# Patient Record
Sex: MALE | Race: WHITE | NOT HISPANIC OR LATINO
[De-identification: names, ages, dates, MRNs, and addresses within clinical notes are randomized per-mention and may not be internally consistent; named-entity substitution may affect disease eponyms.]

---

## 2021-04-19 ENCOUNTER — TRANSCRIPTION ENCOUNTER (OUTPATIENT)
Age: 31
End: 2021-04-19

## 2021-04-20 ENCOUNTER — EMERGENCY (EMERGENCY)
Facility: HOSPITAL | Age: 31
LOS: 1 days | Discharge: LEFT BEFORE DISCHARGE | End: 2021-04-20
Payer: COMMERCIAL

## 2021-04-20 VITALS
WEIGHT: 315 LBS | SYSTOLIC BLOOD PRESSURE: 136 MMHG | RESPIRATION RATE: 18 BRPM | DIASTOLIC BLOOD PRESSURE: 88 MMHG | HEIGHT: 68 IN | HEART RATE: 97 BPM | TEMPERATURE: 99 F | OXYGEN SATURATION: 96 %

## 2021-04-20 PROCEDURE — 99281 EMR DPT VST MAYX REQ PHY/QHP: CPT

## 2021-04-20 PROCEDURE — L9991: CPT

## 2021-06-11 ENCOUNTER — EMERGENCY (EMERGENCY)
Facility: HOSPITAL | Age: 31
LOS: 1 days | Discharge: HOME | End: 2021-06-11
Attending: EMERGENCY MEDICINE
Payer: COMMERCIAL

## 2021-06-11 VITALS
OXYGEN SATURATION: 96 % | TEMPERATURE: 99 F | HEART RATE: 90 BPM | SYSTOLIC BLOOD PRESSURE: 158 MMHG | WEIGHT: 315 LBS | RESPIRATION RATE: 20 BRPM | HEIGHT: 68 IN | DIASTOLIC BLOOD PRESSURE: 101 MMHG

## 2021-06-11 VITALS
RESPIRATION RATE: 18 BRPM | TEMPERATURE: 99 F | SYSTOLIC BLOOD PRESSURE: 118 MMHG | OXYGEN SATURATION: 97 % | HEART RATE: 91 BPM | DIASTOLIC BLOOD PRESSURE: 79 MMHG

## 2021-06-11 LAB
ALBUMIN SERPL ELPH-MCNC: 4.1 G/DL — SIGNIFICANT CHANGE UP (ref 3.3–5.2)
ALP SERPL-CCNC: 103 U/L — SIGNIFICANT CHANGE UP (ref 40–120)
ALT FLD-CCNC: 56 U/L — HIGH
ANION GAP SERPL CALC-SCNC: 13 MMOL/L — SIGNIFICANT CHANGE UP (ref 5–17)
APPEARANCE UR: CLEAR — SIGNIFICANT CHANGE UP
AST SERPL-CCNC: 36 U/L — SIGNIFICANT CHANGE UP
BACTERIA # UR AUTO: ABNORMAL
BASOPHILS # BLD AUTO: 0.07 K/UL — SIGNIFICANT CHANGE UP (ref 0–0.2)
BASOPHILS NFR BLD AUTO: 0.7 % — SIGNIFICANT CHANGE UP (ref 0–2)
BILIRUB SERPL-MCNC: 0.4 MG/DL — SIGNIFICANT CHANGE UP (ref 0.4–2)
BILIRUB UR-MCNC: NEGATIVE — SIGNIFICANT CHANGE UP
BUN SERPL-MCNC: 14.2 MG/DL — SIGNIFICANT CHANGE UP (ref 8–20)
CALCIUM SERPL-MCNC: 9 MG/DL — SIGNIFICANT CHANGE UP (ref 8.6–10.2)
CHLORIDE SERPL-SCNC: 100 MMOL/L — SIGNIFICANT CHANGE UP (ref 98–107)
CO2 SERPL-SCNC: 25 MMOL/L — SIGNIFICANT CHANGE UP (ref 22–29)
COLOR SPEC: YELLOW — SIGNIFICANT CHANGE UP
CREAT SERPL-MCNC: 0.73 MG/DL — SIGNIFICANT CHANGE UP (ref 0.5–1.3)
DIFF PNL FLD: ABNORMAL
EOSINOPHIL # BLD AUTO: 0.17 K/UL — SIGNIFICANT CHANGE UP (ref 0–0.5)
EOSINOPHIL NFR BLD AUTO: 1.6 % — SIGNIFICANT CHANGE UP (ref 0–6)
EPI CELLS # UR: SIGNIFICANT CHANGE UP
GLUCOSE SERPL-MCNC: 228 MG/DL — HIGH (ref 70–99)
GLUCOSE UR QL: NEGATIVE MG/DL — SIGNIFICANT CHANGE UP
HCT VFR BLD CALC: 42.8 % — SIGNIFICANT CHANGE UP (ref 39–50)
HGB BLD-MCNC: 13.8 G/DL — SIGNIFICANT CHANGE UP (ref 13–17)
IMM GRANULOCYTES NFR BLD AUTO: 1.1 % — SIGNIFICANT CHANGE UP (ref 0–1.5)
KETONES UR-MCNC: NEGATIVE — SIGNIFICANT CHANGE UP
LEUKOCYTE ESTERASE UR-ACNC: NEGATIVE — SIGNIFICANT CHANGE UP
LYMPHOCYTES # BLD AUTO: 1.98 K/UL — SIGNIFICANT CHANGE UP (ref 1–3.3)
LYMPHOCYTES # BLD AUTO: 19.1 % — SIGNIFICANT CHANGE UP (ref 13–44)
MCHC RBC-ENTMCNC: 27.2 PG — SIGNIFICANT CHANGE UP (ref 27–34)
MCHC RBC-ENTMCNC: 32.2 GM/DL — SIGNIFICANT CHANGE UP (ref 32–36)
MCV RBC AUTO: 84.3 FL — SIGNIFICANT CHANGE UP (ref 80–100)
MONOCYTES # BLD AUTO: 0.66 K/UL — SIGNIFICANT CHANGE UP (ref 0–0.9)
MONOCYTES NFR BLD AUTO: 6.4 % — SIGNIFICANT CHANGE UP (ref 2–14)
NEUTROPHILS # BLD AUTO: 7.38 K/UL — SIGNIFICANT CHANGE UP (ref 1.8–7.4)
NEUTROPHILS NFR BLD AUTO: 71.1 % — SIGNIFICANT CHANGE UP (ref 43–77)
NITRITE UR-MCNC: NEGATIVE — SIGNIFICANT CHANGE UP
PH UR: 5 — SIGNIFICANT CHANGE UP (ref 5–8)
PLATELET # BLD AUTO: 239 K/UL — SIGNIFICANT CHANGE UP (ref 150–400)
POTASSIUM SERPL-MCNC: 4.3 MMOL/L — SIGNIFICANT CHANGE UP (ref 3.5–5.3)
POTASSIUM SERPL-SCNC: 4.3 MMOL/L — SIGNIFICANT CHANGE UP (ref 3.5–5.3)
PROT SERPL-MCNC: 7.3 G/DL — SIGNIFICANT CHANGE UP (ref 6.6–8.7)
PROT UR-MCNC: 15 MG/DL
RBC # BLD: 5.08 M/UL — SIGNIFICANT CHANGE UP (ref 4.2–5.8)
RBC # FLD: 13.7 % — SIGNIFICANT CHANGE UP (ref 10.3–14.5)
RBC CASTS # UR COMP ASSIST: ABNORMAL /HPF (ref 0–4)
SODIUM SERPL-SCNC: 137 MMOL/L — SIGNIFICANT CHANGE UP (ref 135–145)
SP GR SPEC: 1.01 — SIGNIFICANT CHANGE UP (ref 1.01–1.02)
UROBILINOGEN FLD QL: NEGATIVE MG/DL — SIGNIFICANT CHANGE UP
WBC # BLD: 10.37 K/UL — SIGNIFICANT CHANGE UP (ref 3.8–10.5)
WBC # FLD AUTO: 10.37 K/UL — SIGNIFICANT CHANGE UP (ref 3.8–10.5)
WBC UR QL: SIGNIFICANT CHANGE UP

## 2021-06-11 PROCEDURE — 96375 TX/PRO/DX INJ NEW DRUG ADDON: CPT

## 2021-06-11 PROCEDURE — 99284 EMERGENCY DEPT VISIT MOD MDM: CPT | Mod: 25

## 2021-06-11 PROCEDURE — 96374 THER/PROPH/DIAG INJ IV PUSH: CPT

## 2021-06-11 PROCEDURE — 80053 COMPREHEN METABOLIC PANEL: CPT

## 2021-06-11 PROCEDURE — G1004: CPT

## 2021-06-11 PROCEDURE — 85025 COMPLETE CBC W/AUTO DIFF WBC: CPT

## 2021-06-11 PROCEDURE — 36415 COLL VENOUS BLD VENIPUNCTURE: CPT

## 2021-06-11 PROCEDURE — 74176 CT ABD & PELVIS W/O CONTRAST: CPT

## 2021-06-11 PROCEDURE — 81001 URINALYSIS AUTO W/SCOPE: CPT

## 2021-06-11 PROCEDURE — 99285 EMERGENCY DEPT VISIT HI MDM: CPT

## 2021-06-11 PROCEDURE — 74176 CT ABD & PELVIS W/O CONTRAST: CPT | Mod: 26,ME

## 2021-06-11 RX ORDER — TAMSULOSIN HYDROCHLORIDE 0.4 MG/1
1 CAPSULE ORAL
Qty: 5 | Refills: 0
Start: 2021-06-11 | End: 2021-06-15

## 2021-06-11 RX ORDER — SODIUM CHLORIDE 9 MG/ML
1000 INJECTION INTRAMUSCULAR; INTRAVENOUS; SUBCUTANEOUS ONCE
Refills: 0 | Status: COMPLETED | OUTPATIENT
Start: 2021-06-11 | End: 2021-06-11

## 2021-06-11 RX ORDER — ONDANSETRON 8 MG/1
1 TABLET, FILM COATED ORAL
Qty: 21 | Refills: 0
Start: 2021-06-11 | End: 2021-06-17

## 2021-06-11 RX ORDER — ONDANSETRON 8 MG/1
4 TABLET, FILM COATED ORAL ONCE
Refills: 0 | Status: COMPLETED | OUTPATIENT
Start: 2021-06-11 | End: 2021-06-11

## 2021-06-11 RX ORDER — MORPHINE SULFATE 50 MG/1
4 CAPSULE, EXTENDED RELEASE ORAL ONCE
Refills: 0 | Status: DISCONTINUED | OUTPATIENT
Start: 2021-06-11 | End: 2021-06-11

## 2021-06-11 RX ORDER — TAMSULOSIN HYDROCHLORIDE 0.4 MG/1
0.4 CAPSULE ORAL ONCE
Refills: 0 | Status: COMPLETED | OUTPATIENT
Start: 2021-06-11 | End: 2021-06-11

## 2021-06-11 RX ORDER — KETOROLAC TROMETHAMINE 30 MG/ML
30 SYRINGE (ML) INJECTION ONCE
Refills: 0 | Status: DISCONTINUED | OUTPATIENT
Start: 2021-06-11 | End: 2021-06-11

## 2021-06-11 RX ADMIN — Medication 30 MILLIGRAM(S): at 11:43

## 2021-06-11 RX ADMIN — MORPHINE SULFATE 4 MILLIGRAM(S): 50 CAPSULE, EXTENDED RELEASE ORAL at 10:52

## 2021-06-11 RX ADMIN — SODIUM CHLORIDE 2000 MILLILITER(S): 9 INJECTION INTRAMUSCULAR; INTRAVENOUS; SUBCUTANEOUS at 10:52

## 2021-06-11 RX ADMIN — TAMSULOSIN HYDROCHLORIDE 0.4 MILLIGRAM(S): 0.4 CAPSULE ORAL at 12:50

## 2021-06-11 RX ADMIN — ONDANSETRON 4 MILLIGRAM(S): 8 TABLET, FILM COATED ORAL at 10:52

## 2021-06-11 NOTE — ED STATDOCS - NS ED ATTENDING STATEMENT MOD
I have personally performed a face to face diagnostic evaluation on this patient. I have reviewed the ACP note and agree with the history, exam and plan of care, except as noted.
(1) assistive equipment/uses cane

## 2021-06-11 NOTE — ED STATDOCS - ATTENDING CONTRIBUTION TO CARE
I, Steven Gallego, performed the initial face to face bedside interview with this patient regarding history of present illness, review of symptoms and relevant past medical, social and family history.  I completed an independent physical examination.  I was the initial provider who evaluated this patient. I have signed out the follow up of any pending tests (i.e. labs, radiological studies) to the ACP.  I have communicated the patient’s plan of care and disposition with the ACP.

## 2021-06-11 NOTE — ED STATDOCS - PROGRESS NOTE DETAILS
TANIA TREJO: PT evaluated by intake physician. HPI/PE/ROS as noted above. Will follow up plan per intake physician   left lower back pain radiating to lower abdomen associated with nausea vomiting. no hx of kidney stones. no hematuria   labs and ct pending pt resting comfortably no distress  made aware on all completed results of blood work and ct   ua and culture sent   advised on need for fu with pmd and urology

## 2021-06-11 NOTE — ED STATDOCS - OBJECTIVE STATEMENT
30y male with a PMHx uric acid (was previously prescribed Uracid K) and kidney stones, borderline DM on metformin, sleep apnea, and asthma presents to the ED c/o left flank pain and several vomiting episodes onset 8pm last night. Pt states his abdominal pain radiates all through left flank down to the groin, and is very similar to his pain when he had kidney stones 2 years ago. Pt reports secondary symptoms of diarrhea. Pt denies dysuria, but endorses decreased urine output.

## 2021-06-11 NOTE — ED STATDOCS - PATIENT PORTAL LINK FT
You can access the FollowMyHealth Patient Portal offered by [facility] by registering at the following website: [email or web address]. By joining [employer]’s FollowMyHealth portal, you will also be able to view your health information using other applications (apps) compatible with our system.

## 2021-06-11 NOTE — ED STATDOCS - CARE PROVIDER_API CALL
Alvaro Terry)  Urology  [address], [address]  [place] [address]  Phone: [phone]  Fax: [phone]  Follow Up Time: 7-10 Days

## 2021-07-28 ENCOUNTER — EMERGENCY (EMERGENCY)
Facility: HOSPITAL | Age: 31
LOS: 1 days | Discharge: HOME | End: 2021-07-28
Attending: EMERGENCY MEDICINE
Payer: COMMERCIAL

## 2021-07-28 VITALS
TEMPERATURE: 98 F | WEIGHT: 309.09 LBS | DIASTOLIC BLOOD PRESSURE: 83 MMHG | RESPIRATION RATE: 20 BRPM | HEIGHT: 68 IN | HEART RATE: 86 BPM | OXYGEN SATURATION: 98 % | SYSTOLIC BLOOD PRESSURE: 131 MMHG

## 2021-07-28 LAB
ALBUMIN SERPL ELPH-MCNC: 3.9 G/DL — SIGNIFICANT CHANGE UP (ref 3.3–5.2)
ALP SERPL-CCNC: 117 U/L — SIGNIFICANT CHANGE UP (ref 40–120)
ALT FLD-CCNC: 63 U/L — HIGH
ANION GAP SERPL CALC-SCNC: 13 MMOL/L — SIGNIFICANT CHANGE UP (ref 5–17)
APPEARANCE UR: CLEAR — SIGNIFICANT CHANGE UP
AST SERPL-CCNC: 40 U/L — HIGH
BASOPHILS # BLD AUTO: 0.06 K/UL — SIGNIFICANT CHANGE UP (ref 0–0.2)
BASOPHILS NFR BLD AUTO: 0.6 % — SIGNIFICANT CHANGE UP (ref 0–2)
BILIRUB SERPL-MCNC: 0.4 MG/DL — SIGNIFICANT CHANGE UP (ref 0.4–2)
BILIRUB UR-MCNC: NEGATIVE — SIGNIFICANT CHANGE UP
BUN SERPL-MCNC: 11.1 MG/DL — SIGNIFICANT CHANGE UP (ref 8–20)
CALCIUM SERPL-MCNC: 9.1 MG/DL — SIGNIFICANT CHANGE UP (ref 8.6–10.2)
CHLORIDE SERPL-SCNC: 99 MMOL/L — SIGNIFICANT CHANGE UP (ref 98–107)
CO2 SERPL-SCNC: 25 MMOL/L — SIGNIFICANT CHANGE UP (ref 22–29)
COLOR SPEC: YELLOW — SIGNIFICANT CHANGE UP
CREAT SERPL-MCNC: 0.58 MG/DL — SIGNIFICANT CHANGE UP (ref 0.5–1.3)
DIFF PNL FLD: ABNORMAL
EOSINOPHIL # BLD AUTO: 0.17 K/UL — SIGNIFICANT CHANGE UP (ref 0–0.5)
EOSINOPHIL NFR BLD AUTO: 1.8 % — SIGNIFICANT CHANGE UP (ref 0–6)
GLUCOSE SERPL-MCNC: 177 MG/DL — HIGH (ref 70–99)
GLUCOSE UR QL: 100 MG/DL
HCT VFR BLD CALC: 40 % — SIGNIFICANT CHANGE UP (ref 39–50)
HGB BLD-MCNC: 13.2 G/DL — SIGNIFICANT CHANGE UP (ref 13–17)
IMM GRANULOCYTES NFR BLD AUTO: 1 % — SIGNIFICANT CHANGE UP (ref 0–1.5)
KETONES UR-MCNC: NEGATIVE — SIGNIFICANT CHANGE UP
LEUKOCYTE ESTERASE UR-ACNC: ABNORMAL
LYMPHOCYTES # BLD AUTO: 2.84 K/UL — SIGNIFICANT CHANGE UP (ref 1–3.3)
LYMPHOCYTES # BLD AUTO: 29.2 % — SIGNIFICANT CHANGE UP (ref 13–44)
MCHC RBC-ENTMCNC: 27.6 PG — SIGNIFICANT CHANGE UP (ref 27–34)
MCHC RBC-ENTMCNC: 33 GM/DL — SIGNIFICANT CHANGE UP (ref 32–36)
MCV RBC AUTO: 83.5 FL — SIGNIFICANT CHANGE UP (ref 80–100)
MONOCYTES # BLD AUTO: 0.63 K/UL — SIGNIFICANT CHANGE UP (ref 0–0.9)
MONOCYTES NFR BLD AUTO: 6.5 % — SIGNIFICANT CHANGE UP (ref 2–14)
NEUTROPHILS # BLD AUTO: 5.91 K/UL — SIGNIFICANT CHANGE UP (ref 1.8–7.4)
NEUTROPHILS NFR BLD AUTO: 60.9 % — SIGNIFICANT CHANGE UP (ref 43–77)
NITRITE UR-MCNC: NEGATIVE — SIGNIFICANT CHANGE UP
PH UR: 5 — SIGNIFICANT CHANGE UP (ref 5–8)
PLATELET # BLD AUTO: 223 K/UL — SIGNIFICANT CHANGE UP (ref 150–400)
POTASSIUM SERPL-MCNC: 4.1 MMOL/L — SIGNIFICANT CHANGE UP (ref 3.5–5.3)
POTASSIUM SERPL-SCNC: 4.1 MMOL/L — SIGNIFICANT CHANGE UP (ref 3.5–5.3)
PROT SERPL-MCNC: 7.2 G/DL — SIGNIFICANT CHANGE UP (ref 6.6–8.7)
PROT UR-MCNC: 30 MG/DL
RBC # BLD: 4.79 M/UL — SIGNIFICANT CHANGE UP (ref 4.2–5.8)
RBC # FLD: 14.1 % — SIGNIFICANT CHANGE UP (ref 10.3–14.5)
SODIUM SERPL-SCNC: 137 MMOL/L — SIGNIFICANT CHANGE UP (ref 135–145)
SP GR SPEC: 1.01 — SIGNIFICANT CHANGE UP (ref 1.01–1.02)
UROBILINOGEN FLD QL: NEGATIVE MG/DL — SIGNIFICANT CHANGE UP
WBC # BLD: 9.71 K/UL — SIGNIFICANT CHANGE UP (ref 3.8–10.5)
WBC # FLD AUTO: 9.71 K/UL — SIGNIFICANT CHANGE UP (ref 3.8–10.5)

## 2021-07-28 PROCEDURE — 85025 COMPLETE CBC W/AUTO DIFF WBC: CPT

## 2021-07-28 PROCEDURE — 96374 THER/PROPH/DIAG INJ IV PUSH: CPT

## 2021-07-28 PROCEDURE — 81001 URINALYSIS AUTO W/SCOPE: CPT

## 2021-07-28 PROCEDURE — 36415 COLL VENOUS BLD VENIPUNCTURE: CPT

## 2021-07-28 PROCEDURE — 99284 EMERGENCY DEPT VISIT MOD MDM: CPT | Mod: 25

## 2021-07-28 PROCEDURE — 99285 EMERGENCY DEPT VISIT HI MDM: CPT

## 2021-07-28 PROCEDURE — 74176 CT ABD & PELVIS W/O CONTRAST: CPT | Mod: 26,MA

## 2021-07-28 PROCEDURE — 80053 COMPREHEN METABOLIC PANEL: CPT

## 2021-07-28 PROCEDURE — 74176 CT ABD & PELVIS W/O CONTRAST: CPT | Mod: MA

## 2021-07-28 RX ORDER — TAMSULOSIN HYDROCHLORIDE 0.4 MG/1
1 CAPSULE ORAL
Qty: 7 | Refills: 0
Start: 2021-07-28 | End: 2021-08-03

## 2021-07-28 RX ORDER — KETOROLAC TROMETHAMINE 30 MG/ML
30 SYRINGE (ML) INJECTION ONCE
Refills: 0 | Status: DISCONTINUED | OUTPATIENT
Start: 2021-07-28 | End: 2021-07-28

## 2021-07-28 RX ORDER — ONDANSETRON 8 MG/1
1 TABLET, FILM COATED ORAL
Qty: 12 | Refills: 0
Start: 2021-07-28 | End: 2021-07-30

## 2021-07-28 RX ADMIN — Medication 30 MILLIGRAM(S): at 09:12

## 2021-07-28 NOTE — ED ADULT NURSE NOTE - NSIMPLEMENTINTERV_GEN_ALL_ED
Implemented All Universal Safety Interventions:  [place] to call system. Call bell, personal items and telephone within reach. Instruct patient to call for assistance. Room bathroom lighting operational. Non-slip footwear when patient is off stretcher. Physically safe environment: no spills, clutter or unnecessary equipment. Stretcher in lowest position, wheels locked, appropriate side rails in place.

## 2021-07-28 NOTE — ED STATDOCS - PATIENT PORTAL LINK FT
You can access the FollowMyHealth Patient Portal offered by [facility] by registering at the following website: [email or web address]. By joining [employer]’s FollowMyHealth portal, you will also be able to view your health information using other applications (apps) compatible with our system.

## 2021-07-28 NOTE — ED STATDOCS - CARE PROVIDER_API CALL
Enzo Barnett)  Urology  [address] [place] [address]  Phone: [phone]  Fax: [phone]  Follow Up Time:

## 2021-07-28 NOTE — ED STATDOCS - PROGRESS NOTE DETAILS
NP NOTE:  Charting and results reviewed. Pain resolved, tolerating po.  3mm possible stone mid right ureter.  Will give medications and refer to urology

## 2021-07-28 NOTE — ED STATDOCS - OBJECTIVE STATEMENT
y/o male with PMHx of Kidney stones, Asthma, and COVID PNA on Advair, Zyzol, and Metformin 500mg presents to ED c/o flank pain. Patient reports right flank pain that started last night, with associated dark colored urine, nausea, and vomiting. Had a kidney stone on the left 2 months ago, but has been unable to follow up with urology, next available appointment was October 2021.

## 2021-07-28 NOTE — ED STATDOCS - ATTENDING CONTRIBUTION TO CARE
I,Cameron Naavs MD,  performed the initial face to face bedside interview with this patient regarding history of present illness, review of symptoms and relevant past medical, social and family history.  I completed an independent physical examination.  I was the initial provider who evaluated this patient. I have signed out the follow up of any pending tests (i.e. labs, radiological studies) to the ACP.  I have communicated the patient’s plan of care and disposition with the ACP.  The history, relevant review of systems, past medical and surgical history, medical decision making, and physical examination was documented by the scribe in my presence and I attest to the accuracy of the documentation.

## 2021-07-28 NOTE — ED STATDOCS - NSFOLLOWUPINSTRUCTIONS_ED_ALL_ED_FT
- tylenol or ibuprofen for pain, percocet for severe pain only, do not drive while taking  - take flomax nightly  - zofran is for nausea   - make an appointment with Dr Barnett for follow up

## 2021-07-28 NOTE — ED ADULT NURSE NOTE - OBJECTIVE STATEMENT
assumed pt care at 0910. Pt A&Ox 4 from home c/o right side flank pain with nausea/vomiting that began at 8pm last night. Pt with history of kidney stones, endorses same pain. Pt denies any chest pain, SOB, diarrhea, HA, dizziness, blurred vision, numbness/tingling,  symptoms. No s/s of respiratory distress noted, respirations even & unlabored on room air. Safety maintained,

## 2021-08-17 NOTE — ED ADULT NURSE NOTE - BREATH SOUNDS, MLM
[Normal] : no peripheral adenopathy appreciated [Restricted in physically strenuous activity but ambulatory and able to carry out work of a light or sedentary nature] : Status 1- Restricted in physically strenuous activity but ambulatory and able to carry out work of a light or sedentary nature, e.g., light house work, office work [de-identified] : Pale chronically ill in NAD .  [de-identified] : healed scar  of lymph node biopsy on the left, no residual or  recurrent adenopathy. [de-identified] : prominent aortic valve  click. [de-identified] : no peripheral adenopathy. [de-identified] : spleen no longer palpable . no tenderness. right inguinal hernia, reducible .  Clear

## 2021-11-03 ENCOUNTER — TRANSCRIPTION ENCOUNTER (OUTPATIENT)
Age: 31
End: 2021-11-03

## 2021-11-25 ENCOUNTER — TRANSCRIPTION ENCOUNTER (OUTPATIENT)
Age: 31
End: 2021-11-25

## 2021-11-30 ENCOUNTER — TRANSCRIPTION ENCOUNTER (OUTPATIENT)
Age: 31
End: 2021-11-30

## 2022-04-26 ENCOUNTER — TRANSCRIPTION ENCOUNTER (OUTPATIENT)
Age: 32
End: 2022-04-26

## 2022-04-27 ENCOUNTER — TRANSCRIPTION ENCOUNTER (OUTPATIENT)
Age: 32
End: 2022-04-27

## 2022-05-02 ENCOUNTER — NON-APPOINTMENT (OUTPATIENT)
Age: 32
End: 2022-05-02

## 2022-05-05 ENCOUNTER — OUTPATIENT (OUTPATIENT)
Dept: OUTPATIENT SERVICES | Facility: HOSPITAL | Age: 32
LOS: 1 days | End: 2022-05-05
Payer: COMMERCIAL

## 2022-05-05 ENCOUNTER — APPOINTMENT (OUTPATIENT)
Dept: RADIOLOGY | Facility: CLINIC | Age: 32
End: 2022-05-05
Payer: COMMERCIAL

## 2022-05-05 DIAGNOSIS — J18.0 BRONCHOPNEUMONIA, UNSPECIFIED ORGANISM: ICD-10-CM

## 2022-05-05 PROCEDURE — 71046 X-RAY EXAM CHEST 2 VIEWS: CPT | Mod: 26

## 2022-05-05 PROCEDURE — 71046 X-RAY EXAM CHEST 2 VIEWS: CPT

## 2022-07-22 ENCOUNTER — EMERGENCY (EMERGENCY)
Facility: HOSPITAL | Age: 32
LOS: 1 days | Discharge: HOME | End: 2022-07-22
Attending: EMERGENCY MEDICINE
Payer: COMMERCIAL

## 2022-07-22 VITALS
DIASTOLIC BLOOD PRESSURE: 92 MMHG | SYSTOLIC BLOOD PRESSURE: 140 MMHG | TEMPERATURE: 98 F | OXYGEN SATURATION: 95 % | HEART RATE: 91 BPM | RESPIRATION RATE: 18 BRPM | HEIGHT: 68 IN | WEIGHT: 309.09 LBS

## 2022-07-22 DIAGNOSIS — Z90.49 ACQUIRED ABSENCE OF OTHER SPECIFIED PARTS OF DIGESTIVE TRACT: Chronic | ICD-10-CM

## 2022-07-22 LAB
ANION GAP SERPL CALC-SCNC: 16 MMOL/L — SIGNIFICANT CHANGE UP (ref 5–17)
APPEARANCE UR: CLEAR — SIGNIFICANT CHANGE UP
BACTERIA # UR AUTO: ABNORMAL
BASOPHILS # BLD AUTO: 0.07 K/UL — SIGNIFICANT CHANGE UP (ref 0–0.2)
BASOPHILS NFR BLD AUTO: 0.8 % — SIGNIFICANT CHANGE UP (ref 0–2)
BILIRUB UR-MCNC: NEGATIVE — SIGNIFICANT CHANGE UP
BUN SERPL-MCNC: 17 MG/DL — SIGNIFICANT CHANGE UP (ref 8–20)
CALCIUM SERPL-MCNC: 8.9 MG/DL — SIGNIFICANT CHANGE UP (ref 8.6–10.2)
CHLORIDE SERPL-SCNC: 93 MMOL/L — LOW (ref 98–107)
CO2 SERPL-SCNC: 21 MMOL/L — LOW (ref 22–29)
COLOR SPEC: YELLOW — SIGNIFICANT CHANGE UP
CREAT SERPL-MCNC: 0.74 MG/DL — SIGNIFICANT CHANGE UP (ref 0.5–1.3)
DIFF PNL FLD: ABNORMAL
EGFR: 123 ML/MIN/1.73M2 — SIGNIFICANT CHANGE UP
EOSINOPHIL # BLD AUTO: 0.18 K/UL — SIGNIFICANT CHANGE UP (ref 0–0.5)
EOSINOPHIL NFR BLD AUTO: 2 % — SIGNIFICANT CHANGE UP (ref 0–6)
EPI CELLS # UR: NEGATIVE — SIGNIFICANT CHANGE UP
GLUCOSE SERPL-MCNC: 368 MG/DL — HIGH (ref 70–99)
GLUCOSE UR QL: 1000 MG/DL
HCT VFR BLD CALC: 41.1 % — SIGNIFICANT CHANGE UP (ref 39–50)
HGB BLD-MCNC: 13.9 G/DL — SIGNIFICANT CHANGE UP (ref 13–17)
IMM GRANULOCYTES NFR BLD AUTO: 0.8 % — SIGNIFICANT CHANGE UP (ref 0–1.5)
KETONES UR-MCNC: ABNORMAL
LEUKOCYTE ESTERASE UR-ACNC: NEGATIVE — SIGNIFICANT CHANGE UP
LYMPHOCYTES # BLD AUTO: 2.49 K/UL — SIGNIFICANT CHANGE UP (ref 1–3.3)
LYMPHOCYTES # BLD AUTO: 27.8 % — SIGNIFICANT CHANGE UP (ref 13–44)
MCHC RBC-ENTMCNC: 28.1 PG — SIGNIFICANT CHANGE UP (ref 27–34)
MCHC RBC-ENTMCNC: 33.8 GM/DL — SIGNIFICANT CHANGE UP (ref 32–36)
MCV RBC AUTO: 83.2 FL — SIGNIFICANT CHANGE UP (ref 80–100)
MONOCYTES # BLD AUTO: 0.62 K/UL — SIGNIFICANT CHANGE UP (ref 0–0.9)
MONOCYTES NFR BLD AUTO: 6.9 % — SIGNIFICANT CHANGE UP (ref 2–14)
NEUTROPHILS # BLD AUTO: 5.52 K/UL — SIGNIFICANT CHANGE UP (ref 1.8–7.4)
NEUTROPHILS NFR BLD AUTO: 61.7 % — SIGNIFICANT CHANGE UP (ref 43–77)
NITRITE UR-MCNC: NEGATIVE — SIGNIFICANT CHANGE UP
PH UR: 5 — SIGNIFICANT CHANGE UP (ref 5–8)
PLATELET # BLD AUTO: 213 K/UL — SIGNIFICANT CHANGE UP (ref 150–400)
POTASSIUM SERPL-MCNC: 4.1 MMOL/L — SIGNIFICANT CHANGE UP (ref 3.5–5.3)
POTASSIUM SERPL-SCNC: 4.1 MMOL/L — SIGNIFICANT CHANGE UP (ref 3.5–5.3)
PROT UR-MCNC: 30 MG/DL
RBC # BLD: 4.94 M/UL — SIGNIFICANT CHANGE UP (ref 4.2–5.8)
RBC # FLD: 13.3 % — SIGNIFICANT CHANGE UP (ref 10.3–14.5)
RBC CASTS # UR COMP ASSIST: ABNORMAL /HPF (ref 0–4)
SODIUM SERPL-SCNC: 130 MMOL/L — LOW (ref 135–145)
SP GR SPEC: 1.01 — SIGNIFICANT CHANGE UP (ref 1.01–1.02)
UROBILINOGEN FLD QL: NEGATIVE MG/DL — SIGNIFICANT CHANGE UP
WBC # BLD: 8.95 K/UL — SIGNIFICANT CHANGE UP (ref 3.8–10.5)
WBC # FLD AUTO: 8.95 K/UL — SIGNIFICANT CHANGE UP (ref 3.8–10.5)
WBC UR QL: NEGATIVE /HPF — SIGNIFICANT CHANGE UP (ref 0–5)

## 2022-07-22 PROCEDURE — 96375 TX/PRO/DX INJ NEW DRUG ADDON: CPT

## 2022-07-22 PROCEDURE — 81001 URINALYSIS AUTO W/SCOPE: CPT

## 2022-07-22 PROCEDURE — G1004: CPT

## 2022-07-22 PROCEDURE — 74177 CT ABD & PELVIS W/CONTRAST: CPT | Mod: 26,ME

## 2022-07-22 PROCEDURE — 36415 COLL VENOUS BLD VENIPUNCTURE: CPT

## 2022-07-22 PROCEDURE — 96374 THER/PROPH/DIAG INJ IV PUSH: CPT | Mod: XU

## 2022-07-22 PROCEDURE — 80048 BASIC METABOLIC PNL TOTAL CA: CPT

## 2022-07-22 PROCEDURE — 85025 COMPLETE CBC W/AUTO DIFF WBC: CPT

## 2022-07-22 PROCEDURE — 99285 EMERGENCY DEPT VISIT HI MDM: CPT

## 2022-07-22 PROCEDURE — 82962 GLUCOSE BLOOD TEST: CPT

## 2022-07-22 PROCEDURE — 99284 EMERGENCY DEPT VISIT MOD MDM: CPT | Mod: 25

## 2022-07-22 PROCEDURE — 74177 CT ABD & PELVIS W/CONTRAST: CPT | Mod: ME

## 2022-07-22 PROCEDURE — 96361 HYDRATE IV INFUSION ADD-ON: CPT

## 2022-07-22 RX ORDER — KETOROLAC TROMETHAMINE 30 MG/ML
1 SYRINGE (ML) INJECTION
Qty: 5 | Refills: 0
Start: 2022-07-22 | End: 2022-07-26

## 2022-07-22 RX ORDER — SODIUM CHLORIDE 9 MG/ML
1000 INJECTION INTRAMUSCULAR; INTRAVENOUS; SUBCUTANEOUS ONCE
Refills: 0 | Status: COMPLETED | OUTPATIENT
Start: 2022-07-22 | End: 2022-07-22

## 2022-07-22 RX ORDER — TAMSULOSIN HYDROCHLORIDE 0.4 MG/1
1 CAPSULE ORAL
Qty: 5 | Refills: 0
Start: 2022-07-22 | End: 2022-07-26

## 2022-07-22 RX ORDER — ONDANSETRON 8 MG/1
1 TABLET, FILM COATED ORAL
Qty: 12 | Refills: 0
Start: 2022-07-22 | End: 2022-07-24

## 2022-07-22 RX ORDER — KETOROLAC TROMETHAMINE 30 MG/ML
1 SYRINGE (ML) INJECTION
Qty: 3 | Refills: 0
Start: 2022-07-22 | End: 2022-07-24

## 2022-07-22 RX ORDER — KETOROLAC TROMETHAMINE 30 MG/ML
30 SYRINGE (ML) INJECTION ONCE
Refills: 0 | Status: DISCONTINUED | OUTPATIENT
Start: 2022-07-22 | End: 2022-07-22

## 2022-07-22 RX ORDER — MORPHINE SULFATE 50 MG/1
8 CAPSULE, EXTENDED RELEASE ORAL ONCE
Refills: 0 | Status: DISCONTINUED | OUTPATIENT
Start: 2022-07-22 | End: 2022-07-22

## 2022-07-22 RX ORDER — SODIUM CHLORIDE 9 MG/ML
1000 INJECTION INTRAMUSCULAR; INTRAVENOUS; SUBCUTANEOUS ONCE
Refills: 0 | Status: DISCONTINUED | OUTPATIENT
Start: 2022-07-22 | End: 2022-07-26

## 2022-07-22 RX ORDER — METOCLOPRAMIDE HCL 10 MG
10 TABLET ORAL ONCE
Refills: 0 | Status: COMPLETED | OUTPATIENT
Start: 2022-07-22 | End: 2022-07-22

## 2022-07-22 RX ADMIN — Medication 10 MILLIGRAM(S): at 09:52

## 2022-07-22 RX ADMIN — SODIUM CHLORIDE 1000 MILLILITER(S): 9 INJECTION INTRAMUSCULAR; INTRAVENOUS; SUBCUTANEOUS at 09:51

## 2022-07-22 RX ADMIN — Medication 30 MILLIGRAM(S): at 09:51

## 2022-07-22 RX ADMIN — MORPHINE SULFATE 8 MILLIGRAM(S): 50 CAPSULE, EXTENDED RELEASE ORAL at 09:52

## 2022-07-22 NOTE — ED STATDOCS - NS ED ATTENDING STATEMENT MOD
This was a shared visit with the [facility]. I reviewed and verified the documentation and independently performed the documented:

## 2022-07-22 NOTE — ED STATDOCS - CLINICAL SUMMARY MEDICAL DECISION MAKING FREE TEXT BOX
32 y/o M with PMHx morbid obesity, DM (off  metformin since 3/2021 by PCP due to normal A1c), kidney stones and PSHx gallstones removal presenting for Rt flank pain a/w nausea and NBNB emesis x 3days now getting worse today. Labs showed elevated glucose 368 and UA with large blood. CT abdo showed mild right hydronephrosis due to obstructing 7mm calculus at the UPJ. Pt given IVF, toradol/reglan and morphine with relief of symptoms. All results explained to patient.  Instructed to f/u with urology clinic in 1-2 days. Instructed to f/u with PMD for repeat A1C.  Strict ED return precautions given. Pain meds sent to his pharmacy.

## 2022-07-22 NOTE — ED STATDOCS - CARE PROVIDER_API CALL
Cristi Siddiqui)  Urology  [address] [place] [address]  Phone: [phone]  Fax: [phone]  Follow Up Time: 1-3 Days

## 2022-07-22 NOTE — ED STATDOCS - NSFOLLOWUPINSTRUCTIONS_ED_ALL_ED_FT
1)Follow up with urology clinic in 1-2 days  2) Follow up with your PCP in 1-2 days for repeat blood work (A1c)    Kidney Stones    Kidney stones (urolithiasis) are crystal deposits that form inside your kidneys. Pain is caused by the stone moving through the urinary tract, causing spasms of the ureter. Drink enough water and fluids to keep your urine clear or pale yellow. This will help you to pass the stone or stone fragments. If provided a strainer, strain all urine and keep all particulate matter and stones for a follow up appointment with a urologist.    SEEK IMMEDIATE MEDICAL CARE IF YOU HAVE ANY OF THE FOLLOWING SYMPTOMS: pain not controlled with medication, fever/chills, worsening vomiting, inability to urinate, or dizziness/lightheadedness.

## 2022-07-22 NOTE — ED STATDOCS - OBJECTIVE STATEMENT
32 y/o M with PMHx morbid obesity, DM (off  metformin since 3/2021 by PCP due to normal A1c), kidney stones and PSHx gallstones removal presenting for Rt flank pain a/w nausea and NBNB emesis x 3days now getting worse today. No fever/chills, sob, hematuria/dysuria, cough, abdominal pain, back pain, diarrhea/bloody stools/melena and with no other c/o. No trauma/injury.

## 2022-07-22 NOTE — ED ADULT NURSE NOTE - NS ED NURSE LEVEL OF CONSCIOUSNESS MENTAL STATUS
He can check his lipid  He is on medicine for high cholesterol  Last lab not at goal in 9/17 Awake/Alert

## 2022-07-22 NOTE — ED ADULT TRIAGE NOTE - CHIEF COMPLAINT QUOTE
pt states he has a right kidney stone, c/o RT flank pain  A&Ox3, resp wnl, + vomiting since Wednesday

## 2022-07-22 NOTE — ED ADULT NURSE NOTE - OBJECTIVE STATEMENT
a&ox4 c/o right flank pain, RLQ pain, + urinary symptoms x wednesday. pt reports hx renal colic. denies fever/chills. + N/V

## 2022-07-22 NOTE — ED STATDOCS - PATIENT PORTAL LINK FT
You can access the FollowMyHealth Patient Portal offered by [facility] by registering at the following website: [email or web address]. By joining [employer]’s FollowMyHealth portal, you will also be able to view your health information using other applications (apps) compatible with our system.

## 2022-07-22 NOTE — ED STATDOCS - PROGRESS NOTE DETAILS
PT evaluated by intake physician. HPI/ROS/PE as noted above. Will follow up plan per intake physician  Pt reports improvement of symptoms in the ED after pain meds.

## 2022-07-25 ENCOUNTER — EMERGENCY (EMERGENCY)
Facility: HOSPITAL | Age: 32
LOS: 1 days | Discharge: HOME | End: 2022-07-25
Attending: EMERGENCY MEDICINE
Payer: COMMERCIAL

## 2022-07-25 VITALS
SYSTOLIC BLOOD PRESSURE: 101 MMHG | HEIGHT: 68 IN | DIASTOLIC BLOOD PRESSURE: 70 MMHG | RESPIRATION RATE: 18 BRPM | OXYGEN SATURATION: 98 % | HEART RATE: 96 BPM | WEIGHT: 309.09 LBS | TEMPERATURE: 99 F

## 2022-07-25 VITALS — OXYGEN SATURATION: 97 % | DIASTOLIC BLOOD PRESSURE: 82 MMHG | HEART RATE: 78 BPM | SYSTOLIC BLOOD PRESSURE: 116 MMHG

## 2022-07-25 DIAGNOSIS — Z90.49 ACQUIRED ABSENCE OF OTHER SPECIFIED PARTS OF DIGESTIVE TRACT: Chronic | ICD-10-CM

## 2022-07-25 PROBLEM — E11.9 TYPE 2 DIABETES MELLITUS WITHOUT COMPLICATIONS: Chronic | Status: ACTIVE | Noted: 2022-07-22

## 2022-07-25 PROBLEM — N20.0 CALCULUS OF KIDNEY: Chronic | Status: ACTIVE | Noted: 2021-06-17

## 2022-07-25 LAB
ALBUMIN SERPL ELPH-MCNC: 4 G/DL — SIGNIFICANT CHANGE UP (ref 3.3–5.2)
ALP SERPL-CCNC: 110 U/L — SIGNIFICANT CHANGE UP (ref 40–120)
ALT FLD-CCNC: 41 U/L — HIGH
ANION GAP SERPL CALC-SCNC: 15 MMOL/L — SIGNIFICANT CHANGE UP (ref 5–17)
APPEARANCE UR: CLEAR — SIGNIFICANT CHANGE UP
AST SERPL-CCNC: 42 U/L — HIGH
BACTERIA # UR AUTO: ABNORMAL
BASOPHILS # BLD AUTO: 0.05 K/UL — SIGNIFICANT CHANGE UP (ref 0–0.2)
BASOPHILS NFR BLD AUTO: 0.6 % — SIGNIFICANT CHANGE UP (ref 0–2)
BILIRUB SERPL-MCNC: 0.6 MG/DL — SIGNIFICANT CHANGE UP (ref 0.4–2)
BILIRUB UR-MCNC: NEGATIVE — SIGNIFICANT CHANGE UP
BUN SERPL-MCNC: 13.8 MG/DL — SIGNIFICANT CHANGE UP (ref 8–20)
CALCIUM SERPL-MCNC: 8.8 MG/DL — SIGNIFICANT CHANGE UP (ref 8.6–10.2)
CHLORIDE SERPL-SCNC: 92 MMOL/L — LOW (ref 98–107)
CO2 SERPL-SCNC: 23 MMOL/L — SIGNIFICANT CHANGE UP (ref 22–29)
COLOR SPEC: YELLOW — SIGNIFICANT CHANGE UP
CREAT SERPL-MCNC: 1.1 MG/DL — SIGNIFICANT CHANGE UP (ref 0.5–1.3)
DIFF PNL FLD: ABNORMAL
EGFR: 91 ML/MIN/1.73M2 — SIGNIFICANT CHANGE UP
EOSINOPHIL # BLD AUTO: 0.13 K/UL — SIGNIFICANT CHANGE UP (ref 0–0.5)
EOSINOPHIL NFR BLD AUTO: 1.7 % — SIGNIFICANT CHANGE UP (ref 0–6)
EPI CELLS # UR: SIGNIFICANT CHANGE UP
GLUCOSE SERPL-MCNC: 271 MG/DL — HIGH (ref 70–99)
GLUCOSE UR QL: 100 MG/DL
HCT VFR BLD CALC: 40 % — SIGNIFICANT CHANGE UP (ref 39–50)
HGB BLD-MCNC: 13.5 G/DL — SIGNIFICANT CHANGE UP (ref 13–17)
IMM GRANULOCYTES NFR BLD AUTO: 0.6 % — SIGNIFICANT CHANGE UP (ref 0–1.5)
KETONES UR-MCNC: ABNORMAL
LEUKOCYTE ESTERASE UR-ACNC: ABNORMAL
LIDOCAIN IGE QN: 26 U/L — SIGNIFICANT CHANGE UP (ref 22–51)
LYMPHOCYTES # BLD AUTO: 1.32 K/UL — SIGNIFICANT CHANGE UP (ref 1–3.3)
LYMPHOCYTES # BLD AUTO: 17.1 % — SIGNIFICANT CHANGE UP (ref 13–44)
MCHC RBC-ENTMCNC: 27.7 PG — SIGNIFICANT CHANGE UP (ref 27–34)
MCHC RBC-ENTMCNC: 33.8 GM/DL — SIGNIFICANT CHANGE UP (ref 32–36)
MCV RBC AUTO: 82.1 FL — SIGNIFICANT CHANGE UP (ref 80–100)
MONOCYTES # BLD AUTO: 0.57 K/UL — SIGNIFICANT CHANGE UP (ref 0–0.9)
MONOCYTES NFR BLD AUTO: 7.4 % — SIGNIFICANT CHANGE UP (ref 2–14)
NEUTROPHILS # BLD AUTO: 5.59 K/UL — SIGNIFICANT CHANGE UP (ref 1.8–7.4)
NEUTROPHILS NFR BLD AUTO: 72.6 % — SIGNIFICANT CHANGE UP (ref 43–77)
NITRITE UR-MCNC: NEGATIVE — SIGNIFICANT CHANGE UP
PH UR: 6 — SIGNIFICANT CHANGE UP (ref 5–8)
PLATELET # BLD AUTO: 180 K/UL — SIGNIFICANT CHANGE UP (ref 150–400)
POTASSIUM SERPL-MCNC: 4.2 MMOL/L — SIGNIFICANT CHANGE UP (ref 3.5–5.3)
POTASSIUM SERPL-SCNC: 4.2 MMOL/L — SIGNIFICANT CHANGE UP (ref 3.5–5.3)
PROT SERPL-MCNC: 7 G/DL — SIGNIFICANT CHANGE UP (ref 6.6–8.7)
PROT UR-MCNC: NEGATIVE — SIGNIFICANT CHANGE UP
RBC # BLD: 4.87 M/UL — SIGNIFICANT CHANGE UP (ref 4.2–5.8)
RBC # FLD: 13.3 % — SIGNIFICANT CHANGE UP (ref 10.3–14.5)
RBC CASTS # UR COMP ASSIST: ABNORMAL /HPF (ref 0–4)
SARS-COV-2 RNA SPEC QL NAA+PROBE: SIGNIFICANT CHANGE UP
SODIUM SERPL-SCNC: 130 MMOL/L — LOW (ref 135–145)
SP GR SPEC: 1.01 — SIGNIFICANT CHANGE UP (ref 1.01–1.02)
URATE CRY FLD QL MICRO: ABNORMAL
UROBILINOGEN FLD QL: NEGATIVE MG/DL — SIGNIFICANT CHANGE UP
WBC # BLD: 7.71 K/UL — SIGNIFICANT CHANGE UP (ref 3.8–10.5)
WBC # FLD AUTO: 7.71 K/UL — SIGNIFICANT CHANGE UP (ref 3.8–10.5)
WBC UR QL: SIGNIFICANT CHANGE UP /HPF (ref 0–5)

## 2022-07-25 PROCEDURE — 81001 URINALYSIS AUTO W/SCOPE: CPT

## 2022-07-25 PROCEDURE — 76775 US EXAM ABDO BACK WALL LIM: CPT | Mod: 26

## 2022-07-25 PROCEDURE — 76775 US EXAM ABDO BACK WALL LIM: CPT

## 2022-07-25 PROCEDURE — 99285 EMERGENCY DEPT VISIT HI MDM: CPT

## 2022-07-25 PROCEDURE — 96375 TX/PRO/DX INJ NEW DRUG ADDON: CPT

## 2022-07-25 PROCEDURE — 36415 COLL VENOUS BLD VENIPUNCTURE: CPT

## 2022-07-25 PROCEDURE — 80053 COMPREHEN METABOLIC PANEL: CPT

## 2022-07-25 PROCEDURE — 99284 EMERGENCY DEPT VISIT MOD MDM: CPT | Mod: 25

## 2022-07-25 PROCEDURE — 96374 THER/PROPH/DIAG INJ IV PUSH: CPT

## 2022-07-25 PROCEDURE — 83690 ASSAY OF LIPASE: CPT

## 2022-07-25 PROCEDURE — 85025 COMPLETE CBC W/AUTO DIFF WBC: CPT

## 2022-07-25 PROCEDURE — U0005: CPT

## 2022-07-25 PROCEDURE — U0003: CPT

## 2022-07-25 RX ORDER — ONDANSETRON 8 MG/1
4 TABLET, FILM COATED ORAL ONCE
Refills: 0 | Status: COMPLETED | OUTPATIENT
Start: 2022-07-25 | End: 2022-07-25

## 2022-07-25 RX ORDER — KETOROLAC TROMETHAMINE 30 MG/ML
15 SYRINGE (ML) INJECTION ONCE
Refills: 0 | Status: DISCONTINUED | OUTPATIENT
Start: 2022-07-25 | End: 2022-07-25

## 2022-07-25 RX ORDER — SODIUM CHLORIDE 9 MG/ML
1000 INJECTION INTRAMUSCULAR; INTRAVENOUS; SUBCUTANEOUS ONCE
Refills: 0 | Status: COMPLETED | OUTPATIENT
Start: 2022-07-25 | End: 2022-07-25

## 2022-07-25 RX ORDER — IBUPROFEN 200 MG
1 TABLET ORAL
Qty: 28 | Refills: 0
Start: 2022-07-25 | End: 2022-07-31

## 2022-07-25 RX ORDER — ONDANSETRON 8 MG/1
1 TABLET, FILM COATED ORAL
Qty: 12 | Refills: 0
Start: 2022-07-25 | End: 2022-07-27

## 2022-07-25 RX ORDER — MORPHINE SULFATE 50 MG/1
4 CAPSULE, EXTENDED RELEASE ORAL ONCE
Refills: 0 | Status: DISCONTINUED | OUTPATIENT
Start: 2022-07-25 | End: 2022-07-25

## 2022-07-25 RX ADMIN — SODIUM CHLORIDE 1000 MILLILITER(S): 9 INJECTION INTRAMUSCULAR; INTRAVENOUS; SUBCUTANEOUS at 08:18

## 2022-07-25 RX ADMIN — Medication 15 MILLIGRAM(S): at 09:35

## 2022-07-25 RX ADMIN — ONDANSETRON 4 MILLIGRAM(S): 8 TABLET, FILM COATED ORAL at 09:35

## 2022-07-25 RX ADMIN — MORPHINE SULFATE 4 MILLIGRAM(S): 50 CAPSULE, EXTENDED RELEASE ORAL at 08:18

## 2022-07-25 RX ADMIN — MORPHINE SULFATE 4 MILLIGRAM(S): 50 CAPSULE, EXTENDED RELEASE ORAL at 10:05

## 2022-07-25 RX ADMIN — Medication 15 MILLIGRAM(S): at 10:28

## 2022-07-25 NOTE — ED STATDOCS - PROGRESS NOTE DETAILS
TANIA Nails: PT evaluated by intake physician. HPI/PE/ROS as noted above. Will follow up plan per intake physician TANIA Nails: Pt noting significant improvement in pain, tolerating PO intake. has information for urologist to f/u with, has appt in <2 weeks. provided rx for ibuprofen, percocet and zofran odt. given copy of results, return precautions discussed

## 2022-07-25 NOTE — ED STATDOCS - PATIENT PORTAL LINK FT
You can access the FollowMyHealth Patient Portal offered by [facility] by registering at the following website: [email or web address]. By joining [employer]’s FollowMyHealth portal, you will also be able to view your health information using other applications (apps) compatible with our system.

## 2022-07-25 NOTE — ED ADULT NURSE NOTE - NSICDXPASTMEDICALHX_GEN_ALL_CORE_FT
PAST MEDICAL HISTORY:  DM2 (diabetes mellitus, type 2)     Kidney stones       
Detail Level: Generalized
Detail Level: Zone
Detail Level: Detailed

## 2022-07-25 NOTE — ED ADULT NURSE NOTE - OBJECTIVE STATEMENT
+ right flank, back pain, onset Wednesday 7/20; pt came to ED Friday and diagnosed with kidney stone.  pt returns today because pain is still unbearable and pt unable to hold down any food

## 2022-07-25 NOTE — ED STATDOCS - NSFOLLOWUPINSTRUCTIONS_ED_ALL_ED_FT
- Return to the ED for any new or worsening symptoms.   - Take medication as directed  - Follow-up with Urologist for further evaluation of kidney stone    Kidney Stones    Kidney stones (urolithiasis) are crystal deposits that form inside your kidneys. Pain is caused by the stone moving through the urinary tract, causing spasms of the ureter. Drink enough water and fluids to keep your urine clear or pale yellow. This will help you to pass the stone or stone fragments. If provided a strainer, strain all urine and keep all particulate matter and stones for a follow up appointment with a urologist.    SEEK IMMEDIATE MEDICAL CARE IF YOU HAVE ANY OF THE FOLLOWING SYMPTOMS: pain not controlled with medication, fever/chills, worsening vomiting, inability to urinate, or dizziness/lightheadedness.

## 2022-07-25 NOTE — ED STATDOCS - OBJECTIVE STATEMENT
Pt is a 30 y/o M with PMHx DM2, kidney stones, sleep apnea presents to the ED c/o abdominal pain with associated n/v, hematuria. Pt states he was here Friday, diagnosed with 7mm stone. Pain is progressively worsening  unable to hold solid food and not producing much urine. Symptoms began Thursday night with hematuria and pain in his R flank. Pt d/c with Flomax and Ketoralac. Pt ws told if he didn't pain worsened to come back to ED. Gallbladder removed 11 years ago. Takes Xyzal and B12 on a daily. Denies fever/chills, smoking, drinking. U2D with vaccine. Pt is a 32 y/o M with PMHx DM2, kidney stones, sleep apnea presents to the ED c/o abdominal pain with associated n/v, hematuria. Pt states he was here Friday, diagnosed with 7mm stone. Pain is progressively worsening, unable to hold solid food and not producing much urine. Symptoms began Thursday night with hematuria and pain in his R flank. Pt was d/c with Flomax and Ketoralac. Pt was advised to come back if his pain worsened. Takes Xyzal and B12 on a daily. Denies fever/chills, smoking, drinking. U2D with vaccine. Pt is a 32 y/o M with PMHx DM2, kidney stones, sleep apnea presents to the ED c/o worsening right flank pain and decreased urine output with assoc vomiting.  Reports blood in urine on wednesday night; developed right flank pain on thursday.  Seen in ED on friday and diagnosed with 7mm obstructing kidney stone.  Rx'd flomax and ketoralac once daily which is providing  no relief.   Denies fever/chills, smoking, drinking. UTD with COVID vaccine.  Does not have a urologist.

## 2022-07-25 NOTE — ED STATDOCS - NS_ ATTENDINGSCRIBEDETAILS _ED_A_ED_FT
I, Abran Vargas, performed the initial face to face bedside interview with this patient regarding history of present illness, review of symptoms and relevant past medical, social and family history.  I completed an independent physical examination.  I was the provider who initially evaluated this patient.  The history, relevant review of systems, past medical and surgical history, medical decision making, and physical examination was documented by the scribe in my presence and I attest to the accuracy of the documentation. Follow-up on ordered tests (ie labs, radiologic studies) and re-evaluation of the patient's status has been communicated to the ACP.  Disposition of the patient will be based on test outcome and response to ED interventions.

## 2022-07-25 NOTE — ED STATDOCS - ENMT, MLM
Nasal mucosa clear.  Mouth with normal mucosa  Throat has no vesicles, no oropharyngeal exudates and uvula is midline. Mouth with moist mucosa

## 2022-07-25 NOTE — ED STATDOCS - CARE PROVIDER_API CALL
Alvaro Terry)  Urology  [address], [address]  [place] [address]  Phone: [phone]  Fax: [phone]  Follow Up Time:

## 2022-07-25 NOTE — ED STATDOCS - CLINICAL SUMMARY MEDICAL DECISION MAKING FREE TEXT BOX
kidney stone with intractable pain and decreased urine output , labs, sono, symptom treatment and urology consult for possible stent. kidney stone with intractable pain and decreased urine output:  labs, sono, symptom treatment and urology consult for possible stent.

## 2022-07-26 ENCOUNTER — EMERGENCY (EMERGENCY)
Facility: HOSPITAL | Age: 32
LOS: 1 days | Discharge: HOME | End: 2022-07-26
Attending: STUDENT IN AN ORGANIZED HEALTH CARE EDUCATION/TRAINING PROGRAM
Payer: COMMERCIAL

## 2022-07-26 VITALS
DIASTOLIC BLOOD PRESSURE: 83 MMHG | SYSTOLIC BLOOD PRESSURE: 152 MMHG | HEART RATE: 89 BPM | TEMPERATURE: 98 F | RESPIRATION RATE: 20 BRPM | WEIGHT: 307.99 LBS | HEIGHT: 68 IN | OXYGEN SATURATION: 98 %

## 2022-07-26 VITALS
RESPIRATION RATE: 20 BRPM | DIASTOLIC BLOOD PRESSURE: 84 MMHG | TEMPERATURE: 98 F | SYSTOLIC BLOOD PRESSURE: 128 MMHG | HEART RATE: 71 BPM | OXYGEN SATURATION: 98 %

## 2022-07-26 DIAGNOSIS — Z90.49 ACQUIRED ABSENCE OF OTHER SPECIFIED PARTS OF DIGESTIVE TRACT: Chronic | ICD-10-CM

## 2022-07-26 DIAGNOSIS — Z87.442 PERSONAL HISTORY OF URINARY CALCULI: ICD-10-CM

## 2022-07-26 LAB
ALBUMIN SERPL ELPH-MCNC: 3.9 G/DL — SIGNIFICANT CHANGE UP (ref 3.3–5.2)
ALP SERPL-CCNC: 102 U/L — SIGNIFICANT CHANGE UP (ref 40–120)
ALT FLD-CCNC: 46 U/L — HIGH
ANION GAP SERPL CALC-SCNC: 11 MMOL/L — SIGNIFICANT CHANGE UP (ref 5–17)
APPEARANCE UR: ABNORMAL
AST SERPL-CCNC: 47 U/L — HIGH
BACTERIA # UR AUTO: ABNORMAL
BASOPHILS # BLD AUTO: 0.03 K/UL — SIGNIFICANT CHANGE UP (ref 0–0.2)
BASOPHILS NFR BLD AUTO: 0.4 % — SIGNIFICANT CHANGE UP (ref 0–2)
BILIRUB SERPL-MCNC: 0.5 MG/DL — SIGNIFICANT CHANGE UP (ref 0.4–2)
BILIRUB UR-MCNC: NEGATIVE — SIGNIFICANT CHANGE UP
BUN SERPL-MCNC: 13.6 MG/DL — SIGNIFICANT CHANGE UP (ref 8–20)
CALCIUM SERPL-MCNC: 8.7 MG/DL — SIGNIFICANT CHANGE UP (ref 8.4–10.5)
CHLORIDE SERPL-SCNC: 98 MMOL/L — SIGNIFICANT CHANGE UP (ref 98–107)
CO2 SERPL-SCNC: 25 MMOL/L — SIGNIFICANT CHANGE UP (ref 22–29)
COLOR SPEC: YELLOW — SIGNIFICANT CHANGE UP
CREAT SERPL-MCNC: 1.25 MG/DL — SIGNIFICANT CHANGE UP (ref 0.5–1.3)
DIFF PNL FLD: ABNORMAL
EGFR: 78 ML/MIN/1.73M2 — SIGNIFICANT CHANGE UP
EOSINOPHIL # BLD AUTO: 0.15 K/UL — SIGNIFICANT CHANGE UP (ref 0–0.5)
EOSINOPHIL NFR BLD AUTO: 2.2 % — SIGNIFICANT CHANGE UP (ref 0–6)
EPI CELLS # UR: SIGNIFICANT CHANGE UP
GLUCOSE SERPL-MCNC: 252 MG/DL — HIGH (ref 70–99)
GLUCOSE UR QL: 250 MG/DL
HCT VFR BLD CALC: 38.4 % — LOW (ref 39–50)
HGB BLD-MCNC: 12.4 G/DL — LOW (ref 13–17)
IMM GRANULOCYTES NFR BLD AUTO: 0.7 % — SIGNIFICANT CHANGE UP (ref 0–1.5)
KETONES UR-MCNC: NEGATIVE — SIGNIFICANT CHANGE UP
LEUKOCYTE ESTERASE UR-ACNC: NEGATIVE — SIGNIFICANT CHANGE UP
LYMPHOCYTES # BLD AUTO: 1.4 K/UL — SIGNIFICANT CHANGE UP (ref 1–3.3)
LYMPHOCYTES # BLD AUTO: 20.3 % — SIGNIFICANT CHANGE UP (ref 13–44)
MCHC RBC-ENTMCNC: 27 PG — SIGNIFICANT CHANGE UP (ref 27–34)
MCHC RBC-ENTMCNC: 32.3 GM/DL — SIGNIFICANT CHANGE UP (ref 32–36)
MCV RBC AUTO: 83.5 FL — SIGNIFICANT CHANGE UP (ref 80–100)
MONOCYTES # BLD AUTO: 0.46 K/UL — SIGNIFICANT CHANGE UP (ref 0–0.9)
MONOCYTES NFR BLD AUTO: 6.7 % — SIGNIFICANT CHANGE UP (ref 2–14)
NEUTROPHILS # BLD AUTO: 4.81 K/UL — SIGNIFICANT CHANGE UP (ref 1.8–7.4)
NEUTROPHILS NFR BLD AUTO: 69.7 % — SIGNIFICANT CHANGE UP (ref 43–77)
NITRITE UR-MCNC: NEGATIVE — SIGNIFICANT CHANGE UP
PH UR: 6 — SIGNIFICANT CHANGE UP (ref 5–8)
PLATELET # BLD AUTO: 186 K/UL — SIGNIFICANT CHANGE UP (ref 150–400)
POTASSIUM SERPL-MCNC: 4.9 MMOL/L — SIGNIFICANT CHANGE UP (ref 3.5–5.3)
POTASSIUM SERPL-SCNC: 4.9 MMOL/L — SIGNIFICANT CHANGE UP (ref 3.5–5.3)
PROT SERPL-MCNC: 6.8 G/DL — SIGNIFICANT CHANGE UP (ref 6.6–8.7)
PROT UR-MCNC: NEGATIVE — SIGNIFICANT CHANGE UP
RBC # BLD: 4.6 M/UL — SIGNIFICANT CHANGE UP (ref 4.2–5.8)
RBC # FLD: 13.2 % — SIGNIFICANT CHANGE UP (ref 10.3–14.5)
RBC CASTS # UR COMP ASSIST: NEGATIVE /HPF — SIGNIFICANT CHANGE UP (ref 0–4)
SODIUM SERPL-SCNC: 134 MMOL/L — LOW (ref 135–145)
SP GR SPEC: 1.01 — SIGNIFICANT CHANGE UP (ref 1.01–1.02)
URATE CRY FLD QL MICRO: ABNORMAL
UROBILINOGEN FLD QL: NEGATIVE MG/DL — SIGNIFICANT CHANGE UP
WBC # BLD: 6.9 K/UL — SIGNIFICANT CHANGE UP (ref 3.8–10.5)
WBC # FLD AUTO: 6.9 K/UL — SIGNIFICANT CHANGE UP (ref 3.8–10.5)
WBC UR QL: NEGATIVE /HPF — SIGNIFICANT CHANGE UP (ref 0–5)

## 2022-07-26 PROCEDURE — 96374 THER/PROPH/DIAG INJ IV PUSH: CPT

## 2022-07-26 PROCEDURE — 96375 TX/PRO/DX INJ NEW DRUG ADDON: CPT

## 2022-07-26 PROCEDURE — 36415 COLL VENOUS BLD VENIPUNCTURE: CPT

## 2022-07-26 PROCEDURE — 99285 EMERGENCY DEPT VISIT HI MDM: CPT

## 2022-07-26 PROCEDURE — 76775 US EXAM ABDO BACK WALL LIM: CPT | Mod: 26

## 2022-07-26 PROCEDURE — 81001 URINALYSIS AUTO W/SCOPE: CPT

## 2022-07-26 PROCEDURE — 96361 HYDRATE IV INFUSION ADD-ON: CPT

## 2022-07-26 PROCEDURE — 80053 COMPREHEN METABOLIC PANEL: CPT

## 2022-07-26 PROCEDURE — 87086 URINE CULTURE/COLONY COUNT: CPT

## 2022-07-26 PROCEDURE — 85025 COMPLETE CBC W/AUTO DIFF WBC: CPT

## 2022-07-26 PROCEDURE — 99284 EMERGENCY DEPT VISIT MOD MDM: CPT | Mod: 25

## 2022-07-26 PROCEDURE — 76775 US EXAM ABDO BACK WALL LIM: CPT

## 2022-07-26 RX ORDER — KETOROLAC TROMETHAMINE 30 MG/ML
30 SYRINGE (ML) INJECTION ONCE
Refills: 0 | Status: DISCONTINUED | OUTPATIENT
Start: 2022-07-26 | End: 2022-07-26

## 2022-07-26 RX ORDER — ONDANSETRON 8 MG/1
4 TABLET, FILM COATED ORAL ONCE
Refills: 0 | Status: COMPLETED | OUTPATIENT
Start: 2022-07-26 | End: 2022-07-26

## 2022-07-26 RX ORDER — SODIUM CHLORIDE 9 MG/ML
1000 INJECTION, SOLUTION INTRAVENOUS ONCE
Refills: 0 | Status: COMPLETED | OUTPATIENT
Start: 2022-07-26 | End: 2022-07-26

## 2022-07-26 RX ADMIN — SODIUM CHLORIDE 1000 MILLILITER(S): 9 INJECTION, SOLUTION INTRAVENOUS at 12:43

## 2022-07-26 RX ADMIN — ONDANSETRON 4 MILLIGRAM(S): 8 TABLET, FILM COATED ORAL at 12:43

## 2022-07-26 RX ADMIN — Medication 30 MILLIGRAM(S): at 12:43

## 2022-07-26 NOTE — CONSULT NOTE ADULT - ASSESSMENT
PE:    patient seen and examined   afebrile, VS, /84, P 71, R 20  patient is obese with BMI 46, weight 306 lbs.  abdomen:  soft obese, n/t on palpation , moves his bowels  Back/Flanks:  none tender on palpation of flanks bilaterally  :  voids freely, just voided prior to examination. ( routinely  strains his urine )     Labs: 7/27/22  Complete Blood Count + Automated Diff (07.26.22 @ 12:40)    WBC Count: 6.90 K/uL    RBC Count: 4.60 M/uL    Hemoglobin: 12.4 g/dL    Hematocrit: 38.4 %    Mean Cell Volume: 83.5 fl    Mean Cell Hemoglobin: 27.0 pg    Mean Cell Hemoglobin Conc: 32.3 gm/dL    Red Cell Distrib Width: 13.2 %    Platelet Count - Automated: 186 K/uL    Lytes:   Basic Metabolic Panel (07.22.22 @ 09:30)    Sodium, Serum: 130 mmol/L    Potassium, Serum: 4.1 mmol/L    Chloride, Serum: 93 mmol/L    Carbon Dioxide, Serum: 21.0 mmol/L    Anion Gap, Serum: 16 mmol/L    Blood Urea Nitrogen, Serum: 17.0 mg/dL    Creatinine, Serum: 0.74 mg/dL    Glucose, Serum: 368 mg/dL    Calcium, Total Serum: 8.9 mg/dL       UA:  Urinalysis (07.26.22 @ 12:40)    pH Urine: 6.0    Glucose Qualitative, Urine: 250 mg/dL    Blood, Urine: Small    Color: Yellow    Urine Appearance: Slightly Turbid    Bilirubin: Negative    Ketone - Urine: Negative    Specific Gravity: 1.015    Protein, Urine: Negative    Urobilinogen: Negative mg/dL    Nitrite: Negative    Leukocyte Esterase Concentration: Negative      Radiology 7/26/22  IMPRESSION:  Mild to moderate hydronephrosis on the right is similar to the study of   the previous day    radiology 7/25/22  IMPRESSION:  Mild right hydronephrosis, unchanged.      Radiology 7/22/22  IMPRESSION:  Mild right hydronephrosis secondary to an obstructing 7 mm calculus at   the ureteropelvic junction.          Impression:  right renal calculi, 7mm at the UPJ ( appears clinicallt yo be moving down stream ) . afebrile, pain intermittently managed, no fevers, no wbc, no urine cultures noted over past visits.  States nausea and unable to keep food down.   Discussed with Surgeon present situation and management recommendations.  Plan:  discussed fully with patient recommendations to be admitted and to prepare him for surgery 24-48 hrs. with cysto and stent placement.  Explaining risks and complications.  He understands recommendations but states would like to try further conservative care because he has a wedding he would like to attend in [place] this upcoming week-end.  Explained also possibility of this becoming an infected situation , he understands but still like to try.  Discussed this with ER and They will discharge him with follow-up recommendation when he returns with Urology. - thank you .

## 2022-07-26 NOTE — ED ADULT TRIAGE NOTE - NSTRIAGECARE_GEN_A_ER
PATIENT:NAVYA VARGAS                                  MEDICAL RECORD: [id number]
: 34                                            LOCATION:D.CAT          
ACCT# [id number]                                       ADMISSION DATE: 10/22/19
 
 
 Generatedon:10/22/038710:53
Patient name: NAVYA VARGAS Patient #: [id number] Visit #: [id number] SSN: 4
66- :
1936 Date of study: 10/22/2019
Page: Of
Hemodynamic Procedure Report
****************************
Patient Data
Patient Demographics
Procedure consent was obtained
First Name: NAVYA         Gender: Male
Last Name: ALICIA          : 1936
Patient #: [id number]       Age: 82 year(s)
Visit #: [id number]       Race: Unknown
SSN: 
Accession #:
[id number]
Additional ID: [id number]
Contact details
Address: [address] [place] [address]   Phone: [phone]
State: [place]
City: [place]
Zip code: [address]
Past Medical History
Allergies
Allergen        Reaction        Date         Comments
Reported
Penicillins                     2018
Admission
Admission Data
Admission Date: 10/22/2019  Admission Time: 10:46
Arrival Date: 10/22/2019    Arrival Time: 0:00
Admit Source: Other         Insurance Payor: Private
health insurance
[facility] #: [id number]
Height (in.): 67.72         BSA: 1.93 (m2)
Height (cm.): 172           BMI: 27.04 (kg/m2)
Weight (lbs.): 176.37
Weight (kg.): 80
Lab Results
Lab Result Date: 10/22/2019 Lab Result Time: 0:00
Biochemistry
Name         Units    Result                Min      Max
BUN          mg/dl    26       --(----)-*   7        18
Creatinine   mg/dl    1.2      --(---*)--   0.6      1.3
eGFR         ml/min   61.89774 *-(----)--   90       120
NONAFRICAN
CBC
Name         Units    Result                Min      Max
Hemoglobin   g/dl     14.6     --(-*--)--   13.5     17.5
Procedure
Procedure Types
Cath Procedure
Diagnostic Procedure
 
LHC
LHC w/Coronaries w/Grafts
Sedation Charges
Moderate Sedation up to 15 minutes
Moderate Sedation up to 30 minutes
Procedure Description
Procedure Date
Procedure Date: 10/22/2019
Procedure Start Time: 13:15
Procedure End Time: 13:48
Procedure Staff
Name                            Function
Antwan Rosario MD                   Performing Physician
Madhuri Singh RT               Monitor
Stacy Walker RN             Nurse
Renetta Valera RT                    Scrub
Procedure Data
Cath Procedure
Fluoroscopy
Diagnostic fluoroscopy      Total fluoroscopy Time: 5.3
time: 5.3 min               min
Diagnostic fluoroscopy      Total fluoroscopy dose: 855
dose: 855 mGy               mGy
Contrast Material
Contrast Material Type                       Amount (ml)
Isovue 300                                   106
Entry Location
Entry     Primary  Successful  Side  Size  Upsize Upsize Entry    Closure     Grider
ccessful  Closure
Location                             (Fr)  1 (Fr) 2 (Fr) Remarks  Device        
          Remarks
Femoral                        Left  5 Fr                         Exoseal
artery
Femoral                        Right 4 Fr                unable   Manual
artery                                                   to cross Compression
with 5
fr. 4 fr
sheath
pulled
Estimated blood loss: 10 ml
Diagnostic catheters
Device Type               Used For           End Catheter
Placement
MULTIPACK JL 4.0 5Fr      Procedure
catheter
DIAGNOSTIC AR1 MOD 5Fr    Procedure
catheter ([id number])
DIAGNOSTIC IM 5Fr         Procedure
catheter (083123L)
MULTIPACK Pigtail 5 Fr    Procedure
catheter
Procedure Complications
No complications
Procedure Medications
Medication           Administration Route Dosage
0.9% NaCl            I.V.                 100 ml/hr
Lidocaine 2%         added to field       20
Heparin Flush Bag    added to field       2 bags
(1000units/500ml NS)
Oxygen               NC                   2 l/min
 
Versed               I.V.                 1 mg
Fentanyl             I.V.                 50 mcg
Versed               I.V.                 0.5 mg
Hemodynamics
Rest
BSA: 1.93 (m2) O2 Consumption: Estimated: 218.97 (ml/min) O2 Consumption indexed
: Estimated:113.46
(ml/min/m) Heart Rate: 69 (bpm)
Pressure Samples
Time     Site     Value (mmHg) Purpose      Heart      Use
Rate(bpm)
13:42    LV       122/-6,11    Snapshot     69
13:42    AO       158/36(80)   Pullback     55
13:42    LV       125/-4,10    Pullback     55
Gradients
Valve  Time  Site 1    Site 2     Mean    SEP/DFP    Peak To Heart  Use
(mmHg)  (sec/min)  Peak    Rate
(mmHg)  (bpm)
Aortic 13:42 LV        AO         0       17         0       55
125/-4,10 158/36(80)
Calculations
Valve    P-P      Mean      Valve     Index  Valve    Source
Name              Gradient  Area             Flow
(cm2)
Aortic   0        0
0        0
Snapshots
Pre Cath      Intra         NCS           Post Cath
Vital Signs
Time     Heart  Resp   SPO2 etCO2   NIBP (mmHg) Rhythm  Pain    Sedation
Rate   (ipm)  (%)  (mmHg)                      Status  Level
(bpm)
12:55:06 67     23     98   29.4    135/76(119) NSR     0 (11)  10(A)
, No
pain
12:59:18 59     18     100  24.8    140/76(119) NSR     0 (11)  10(A)
, No
pain
13:03:32 63     14     95   30.1    136/72(116) NSR     0 (11)  10(A)
, No
pain
13:07:42 64     15     93   30.9    137/77(113) NSR     0 (11)  10(A)
, No
pain
13:11:52 65     12     92   0       138/78(112) NSR     0 (11)  9(A)
, No
pain
13:16:04 62     14     94   0       132/74(118) NSR     0 (11)  9(A)
, No
pain
13:20:12 64     13     94   9.8     132/79(111) NSR     0 (11)  9(A)
, No
pain
13:24:20 65     19     94   8.3     140/79(122) NSR     0 (11)  9(A)
, No
pain
13:28:30 65     13     94   9       140/83(113) NSR     0 (11)  9(A)
, No
pain
13:32:43 67     14     94   0       95/74(91)   NSR     0 (11)  9(A)
 
, No
pain
13:37:36 69     13     95   12.8    135/74(108) NSR     0 (11)  9(A)
, No
pain
13:41:48 68     14     94   15      132/72(108) NSR     0 (11)  9(A)
, No
pain
13:46:00 70     14     94   17.3    134/73(106) NSR     0 (11)  10(A)
, No
pain
Medications
Time     Medication       Route  Dose  Verified  Delivered Reason     Notes  Eff
ectiveness
by        by
12:54:13 0.9% NaCl        I.V.   100   Antwan      Stacy    used for
ml/hr Abraham Walker procedure
RN
12:54:24 Lidocaine 2%     added  20ml  Stacy    Stacy    for local
to     vial  Denise Denise anesthetic
field        RN        RN
12:54:33 Heparin Flush    added  2     Antwan      Stacy    used for
Bag              to     bags  Abraham Walker procedure
(1000units/500ml field                  RN
NS)
12:54:42 Oxygen           NC     2     Antwan      Stacy    for low 02
l/min Abraham Walker sats
RN
13:04:59 Versed           I.V.   1 mg  Antwan      Stacy    for
Abraham Walker sedation
RN
13:05:12 Fentanyl         I.V.   50    Natwan      Stacy    for
mcg   Abraham Walker sedation
RN
13:17:19 Versed           I.V.   0.5   Antwan      Stacy    for
mg    Abraham Walker sedation
RN
Procedure Log
Time     Note
12:32:02 Informed consent obtained and on chart
12:38:28 Patient Height : 67.72 inches
12:38:33 Patient Weight : 176.37 lbs
12:38:37 Arrival Date: 10/22/2019 12:00:00 AM
12:38:40 Admit Source: Other
12:38:49 Insurance Payor : Private health insurance
12:39:40 Lab Result : BUN 26 mg/dl
12:39:40 Lab Result : eGFR NONAFRICAN 61.69107 ml/min
12:39:40 Lab Result : Hemoglobin 14.6 g/dl
12:39:40 Lab Result : Creatinine 1.2 mg/dl
12:40:35 Procedure Status Elective Heart Cath (OP).
12:41:06 **ACC** Patient presents with Stable Angina CCS
Anginal Class 3--Marked limitation of physical
activity, angina occurs with ordinary activity..
12:41:55 Renetta Valera RT(R) sent for patient. Start room use.
12:41:57 Time tracking: Regular hours (M-F 7:00 - 5:00)
12:42:03 Plan of Care:Hemodynamics will remain stable., Cardiac
rhythm will remain stable., Comfort level will be
maintained., Respiratory function will remain
adequate., Patient/ family verbilizes understanding of
procedure., Procedure tolerated without complication.,
 
Recovers from procedure without complications..
12:42:08 Patient received from Pre/Post Procedure Room to CCL 2
Alert and oriented. Tansferred to table in Supine
position.
12:42:28 H&P Date Dictated: 2019 Within 30 days and on
chart..
12:42:32 Pre-procedure instructions explained to patient.
12:42:34 Family in waiting room.
12:42:36 Patient NPO since Midnight.
12:43:31 1) 90+ Normal kidney functon but urine findings or
structural abnormalities or genetic trait point to
kidney disease.
12:43:53 Maximum allowable contrast dose (3.7 X eGFR X 0.75)169
ml.
12:44:00 Use device set Femoral Dx
12:44:02 ACIST Syringe (86203) opened to sterile field.
12:44:03 Bag Decanter (2002S) opened to sterile field.
12:44:03 Medline Cath Pack ([id number]) opened to sterile field.
12:44:05 ACIST Hand Control (42128) opened to sterile field.
12:44:05 ACIST Manifold (58894) opened to sterile field.
12:44:06 DIAGNOSTIC Multipack 5Fr catheter set (EN7634) opened
to sterile field.
12:44:06 Tegaderm 4 x 4 (1626W) opened to sterile field.
12:44:07 MICROPUNCTURE 4FR Cook (I78132) opened to sterile
field.
12:44:13 SHEATH 5FR Beardsley (SLC007) opened to sterile field.
12:44:14 EMERALD Guide Wire (902-578) opened to sterile field.
12:53:51 Vital chart was started
12:54:13 0.9% NaCl 100 ml/hr I.V. was administered by Stacy Walker RN; used for procedure; Verbal order read
back and verified.
12:54:24 Lidocaine 2% 20ml vial added to field was administered
by Stacy Walker RN; for local anesthetic; Verbal
order read back and verified.
12:54:33 Heparin Flush Bag (1000units/500ml NS) 2 bags added to
field was administered by Stacy Walker RN; used
for procedure; Verbal order read back and verified.
12:54:42 Oxygen 2 l/min NC was administered by Stacy Walker RN; for low 02 sats; Verbal order read back and
verified.
13:00:33 Is the patient allergic to Iodine/contrast media? No.
13:00:40 Was the patient premedicated? Yes
13:00:42 Is patient on blood thinner?No
13:00:45 Patient diabetic? No.
13:00:53 Snore? Yes
13:00:55 Sleep apnea? No
13:01:11 Dentures? Yes in
13:01:40 IV patent on arrival in left forearm with 0.9% NaCl at
[facility].
13:02:10 Lab results completed and on chart.
13:02:14 Right groin area was prepped with chlora-prep and
draped in sterile fashion
13:02:14 Alarms reviewed by RKole N.
13:02:15 Sharps counted by scrub and verified by R.N.
13:02:16 Physician arrived
13:02:17 --------ALL STOP TIME OUT------
13:02:18 Final Timeout: patient, procedure, and site verified
with staff and physician. All members of the team are
in agreement.
13:02:20 Right groin site verified by team.
 
13:02:26 Fire Safety Assessment: A--An alcohol-based skin
anteseptic being used preoperatively., C--Open oxygen
or nitrous oxide is being used., D--An ESU, laser, or
fiber-optic light is being used.
13:02:32 Sedation plan: IV Moderate Sedation Medication:Versed,
Fentanyl
13:04:59 Versed 1 mg I.V. was administered by Stacy Walker RN; for sedation; Verbal order read back and verified.
13:05:12 Fentanyl 50 mcg I.V. was administered by Stacy Walker RN; for sedation; Verbal order read back and
verified.
13:15:07 Procedure started.
13:15:07 Full Disclosure recording started
13:15:58 Local anesthetic to right femoral artery with
Lidocaine 2% by Antwan Rosario MD.**INITIAL ACCESS ONLY**
13:17:19 Versed 0.5 mg I.V. was administered by Stacy Walker RN; for sedation; Verbal order read back and
verified.
13:24:03 SHEATH 4FR [place] ([id number]) opened to sterile field.
13:24:59 5 fr sheath unable to cannulate. 4 fr sheath inserted
and exchanged for 5 fr but still unable to cannulate
13:25:19 left groin prepped.
13:25:30 A 4 Fr sheath was inserted into the Right Femoral
arteryunable to cross with 5 fr. 4 fr sheath pulled
13:25:33 Sheath removed intact; hemostasis achieved with Manual
Compression to the Right Femoral artery.
13:27:59 Local anesthetic to left femerol artery with Lidocaine
2% by Antwan Rosario MD.**ADDITIONAL ACCESS**
13:28:52 A 5 Fr sheath was inserted into the Left Femoral
artery
13:30:03 A MULTIPACK JL 4.0 5Fr catheter was advanced over the
wire and used for Procedure.
13:30:26 LCA angiography performed.
13:31:28 Catheter removed.
13:31:43 A DIAGNOSTIC AR1 MOD 5Fr catheter ([id number]) was
advanced over the wire and used for Procedure.
13:33:03 RCA angiography performed.
13:38:24 SVG to RCA angiography performed.
13:38:28 SVG to Circ angiography performed.
13:38:31 Catheter removed.
13:38:49 A DIAGNOSTIC IM 5Fr catheter ([id number]) was advanced
over the wire and used for Procedure.
13:38:59 LIMA to LAD angiography performed.
13:40:42 Catheter removed.
13:40:50 A MULTIPACK Pigtail 5 Fr catheter was advanced over
the wire and used for Procedure.
13:40:54 LV gram done using TOBIAS
13:41:08 Zero performed for pressure channel P1
13:42:29 EF : 55 %
13:42:55 Catheter removed.
13:42:57 EXOSEAL 5Fr () opened to sterile field.
13:44:43 Sheath removed intact; hemostasis achieved with
Exoseal to the Left Femoral artery.
13:45:25 Procedure ended.(Physican Out)
13:45:49 Fluoroscopy time 05.30 minutes.
13:45:54 Flurop Dose total: 855
13:45:54 Fluoroscopy dose: 855 mGy
13:45:59 Dose Area Product 23849 mGy/cm.
13:46:07 Contrast amount:Isovue 300 106ml.
13:46:12 Maximum allowable dose exceeded? No.
 
13:46:14 Sharps counted by scrub and verified by R.N.
13:46:18 Insertion/operative site no bleeding no hematoma.
13:46:31 Post-op/insertion site Right Femoral artery dressed
using a 4 x 4 and Tegaderm.
13:46:36 Post-op/insertion site Left Femoral artery dressed
using a 4 x 4 and Tegaderm.
13:46:43 Post right femoral artery:stable
13:46:52 Post left femerol artery:stable
13:46:55 Post Procedure Pulses reassessed and unchanged
13:47:04 Post-procedure physical assessment completed. ASA
score P 3 - A patient with severe systemic disease as
per Antwan Rosario MD.
13:47:07 Post procedure rhythm: unchanged.
13:47:12 Estimated blood loss: 10 ml
13:47:14 Post procedure instruction explained to
patient.Patient verbalizes understanding.
13:47:29 Procedure type changed to Cath procedure, Diagnostic
procedure, LHC, LHC w/Coronaries w/Grafts, Sedation
Charges, Moderate Sedation up to 15 minutes, Moderate
Sedation up to 30 minutes
13:47:31 Procedure and supply charges have been captured,
reviewed, submitted and are correct.
13:48:11 Procedure Complication : No complications
13:48:15 Vital chart was stopped
13:48:17 [facility] Findings: mild to moderate CAD (<70%)
13:48:22 Operative report dictated upon procedure completion.
13:48:23 See physician's report for complete and final results.
13:48:26 Report given to Pre/Post Procedure Room.
13:48:29 Patient transfered to Pre/Post Procedure Room with
Stretcher.
13:48:32 Procedure ended.
13:48:32 Full Disclosure recording stopped
13:48:38 End room use (Document Last)
Device Usage
Item Name     Manufacture  Quantity  Catalog    Hospital Part    Current Minimal
 Lot# /
Number     Charge   Number  Stock   Stock   Serial#
Code
ACIST Syringe Acist        1         94322      653709   191154  596510  20
(12944)       Medical
Systems Inc
Bag Decanter  Microtek     1         S      881734   38251   624564  5
()       Medical Inc.
Medline Cath  Medline      1         [id number]  713860   73271   497589  5
Pack
([id number])
ACIST Hand    Acist        1         93147      896475   769062  471625  5
Control       Medical
(78554)       Systems Inc
ACIST         Acist        1         55127      909289   093820  035535  5
Manifold      Medical
(97306)       Systems Inc
DIAGNOSTIC    Cardinal     1         [id number]     446635   82168   269329  30
Multipack 5Fr Health
catheter set
(TV4436)
Tegaderm 4 x  3M           1         1626W      225098   747549  707383  5
4 (1626W)
MICROPUNCTURE CAPNIA Medical 1         G88493     938990   312260  044827  5
4FR Cook
 
(Y54193)
SHEATH 5FR    Terumo       1         GSE376     566009   080651  124092  5
Beardsley
(VOX532)
EMERALD Guide Cardinal     1         502-799    259823   427458  325845  5
Wire          Health
(502455)
SHEATH 4FR St [facility] Timur      1         545422     389744   316616  681178  5
Timur (201178)
MULTIPACK JL  Cardinal     1                                     307104  5
4.0 5Fr       Health
catheter
DIAGNOSTIC    Cardinal     1         357421P    684159   604860  151217  15
AR1 MOD 5Fr   Health
catheter
(251090T)
DIAGNOSTIC IM Cardinal     1         231434U    317664   770294  479784  5
5Fr catheter  Health
(325475V)
MULTIPACK     Cardinal     1                                     397951  5
Pigtail 5 Fr  Health
catheter
EXOSEAL 5Fr   Cardinal     1               167958   212204  442037  10
()       Health
Signature Audit [place]
Stage           Time        Signature      Unsigned
Intra-Procedure 10/22/2019  Madhuri Singh
1:51:59 PM  RT(R)
Intra-Procedure 10/22/2019  Stacy
1:52:31 PM  Denise LEMOS
Intra-Procedure 10/22/2019  Antwan Rosario MD
1:53:13 PM
Signatures
Performing Physician :  Signature :
Antwan Rosario MD           ______________________________
Date : ______________ Time :
______________
Monitor : Madhuri Singh Signature :
RT                       ______________________________
Date : ______________ Time :
______________
Nurse : Stacy          Signature :
Denise LEMOS            ______________________________
Date : ______________ Time :
______________
 
 
 
 
 
 
 
 
 
 
[facility]                                          
[address] [place] [address]
Face Mask

## 2022-07-26 NOTE — ED ADULT TRIAGE NOTE - WEIGHT IN KG
Operative Note      Pre-Operative Diagnosis: thickened endometrium, uterine polyp     Post-Operative Diagnosis: thickened endometrium, history of hyperplasia       Procedure Performed:   Procedure(s):  hysteroscopy, dilation and curettage, morcellation o 139.7

## 2022-07-26 NOTE — ED STATDOCS - CARE PROVIDER_API CALL
David Payton)  Urology  [address], [place] [address]  Phone: [phone]  Fax: [phone]  Follow Up Time:

## 2022-07-26 NOTE — ED STATDOCS - PATIENT PORTAL LINK FT
You can access the FollowMyHealth Patient Portal offered by [facility] by registering at the following website: [email or web address]. By joining [employer]’s FollowMyHealth portal, you will also be able to view your health information using other applications (apps) compatible with our system.

## 2022-07-26 NOTE — ED STATDOCS - CLINICAL SUMMARY MEDICAL DECISION MAKING FREE TEXT BOX
32 y/o male with PMHx of renal stones recently here for 3rd visit in the past week for the same kidney stone. eval for worsening hydro as well as uti/ infected stone. labs, UA, pain control.

## 2022-07-26 NOTE — ED STATDOCS - OBJECTIVE STATEMENT
32 y/o male with PMHx of renal stones, Type 2 DM, sleep apnea presents with right flank pain, diagnosed 7mm kidney stone. More discomfort today compared to the last two visits. Pt was here in the ED twice in the last two days. States he cannot hold down food, vomiting every 45 minutes.  On oxycodone, Flomax. Hematuria yesterday, clearer today. Pt denies fevers/chills, ha, loc, focal neuro deficits, cp/sob/palp, cough, abd pain/n/v/d, recent travel and sick contacts.

## 2022-07-26 NOTE — ED STATDOCS - NSFOLLOWUPINSTRUCTIONS_ED_ALL_ED_FT
1. Take the medications prescribed yesterday as written for pain control and nausea.  2. Call Dr. Payton from urology if pain becomes acutely worse.  3. Follow up with urology at the next available appointment.   4. Return to the ED for any new or worsening symptoms, such as worsening pain, fever.    You have been diagnosed with a kidney stone. To control the pain, you should take Ibuprofen 400 mg along with Tylenol 650mg every 6 hours. These are both over the counter medications that you can  at your local pharmacy without a prescription. We also sent a stronger pain medication to your pharmacy that you should only take when you are still having pain despite trying Tylenol and Ibuprofen. If you are still having severe pain in 48 hours you should return to the emergency room or a urologist if able. If you began having fever, uncontrollable nausea and vomiting then you also need to come back to the ED.

## 2022-07-26 NOTE — CONSULT NOTE ADULT - SUBJECTIVE AND OBJECTIVE BOX
UROLOGY CONSULT:    Called to see patient presenting to ER for 3rd time in past week with intermittent severe pain, with unable to keep food down for 1 week.  Denies fever, chills, denies hematuria at present.  Patient states has history renal stones for several years and has past stones in the past.  Patient previously was living and managed in [place] with private urologist and recently been on [place].  States today the pain is felt in the right inguinal region.  Moved from mid right flank last few days- over last day moved down. No difficulty voiding, doesn't notice any bleeding when voids.    allergies:  denies any allergies  past medical hx: DM2, renal calculi ( has passed stones several times before- told they were uric acid stones ) ,  low back pain  past surgical hx: cholecystectomy

## 2022-07-26 NOTE — ED ADULT TRIAGE NOTE - MODE OF ARRIVAL
Walk in [FreeTextEntry1] : Very pleasant 53 year old woman who presents for evaluation of microscopic hematuria found on urinalysis approximately 3 weeks ago to 6 red blood cells per high-powered field.  This was accompanied by a urinary tract infection at that time.. She reports no history of gross hematuria.  No flank pain. No suprapubic pain. No dysuria.  No urinary frequency, urgency. No history of urinary tract infections or renal impairment. No aggravating or alleviating factors that she knows of contributing to hematuria.\par Father had a history of bladder cancer.  He was a heavy smoker..  No family history of nephrolithiasis.  She does not smoke.\par  [employer]

## 2022-07-27 ENCOUNTER — APPOINTMENT (OUTPATIENT)
Dept: UROLOGY | Facility: CLINIC | Age: 32
End: 2022-07-27

## 2022-07-27 VITALS
WEIGHT: 309 LBS | BODY MASS INDEX: 46.83 KG/M2 | HEIGHT: 68 IN | DIASTOLIC BLOOD PRESSURE: 94 MMHG | HEART RATE: 75 BPM | SYSTOLIC BLOOD PRESSURE: 157 MMHG

## 2022-07-27 PROBLEM — Z00.00 ENCOUNTER FOR PREVENTIVE HEALTH EXAMINATION: Status: ACTIVE | Noted: 2022-07-27

## 2022-07-27 LAB
CULTURE RESULTS: SIGNIFICANT CHANGE UP
SPECIMEN SOURCE: SIGNIFICANT CHANGE UP

## 2022-07-27 PROCEDURE — 99204 OFFICE O/P NEW MOD 45 MIN: CPT

## 2022-07-27 NOTE — ASSESSMENT
[FreeTextEntry1] : \par plan:\par - fluid\par - lemon and lime\par - start potassium citrate and start monitoring urine PH\par - serum uric acid\par - weight loss\par - next visit in 2 weeks with new ultrasound

## 2022-07-27 NOTE — HISTORY OF PRESENT ILLNESS
[FreeTextEntry1] : 32 yo M for follow up\par PMH: morbid obesity\par PSH: cholecystectomy\par NKDA\par not a smoker\par \par first episode of passing stone 2011\par ever since 7 episodes of passing stones, knows he passed stones up to 6.8 mm\par all are uric acid, knows his urine PH is 5.5-6\par already on a weight loss program, already lost ~ 110 and still going, now around 3.7 pounds\par \par recently diagnosed with a right UPJ stone ~ 7 mm\par was also in the hospital yesterday, labs stable, no fever\par feeling better, thinks he passed a few fragments today\par \par plan:\par - fluid\par - lemon and lime\par - start potassium citrate and start monitoring urine PH\par - serum uric acid\par - weight loss\par - next visit in 2 weeks with new ultrasound

## 2022-08-05 ENCOUNTER — APPOINTMENT (OUTPATIENT)
Dept: UROLOGY | Facility: CLINIC | Age: 32
End: 2022-08-05

## 2022-08-05 PROCEDURE — 99215 OFFICE O/P EST HI 40 MIN: CPT

## 2022-08-05 NOTE — HISTORY OF PRESENT ILLNESS
[FreeTextEntry1] : 32 yo M for follow up\par PMH: morbid obesity\par PSH: cholecystectomy\par NKDA\par not a smoker\par \par first episode of passing stone 2011\par ever since 7 episodes of passing stones, knows he passed stones up to 6.8 mm\par all are uric acid, knows his urine PH is 5.5-6\par already on a weight loss program, already lost ~ 110 and still going, now around 3.7 pounds\par \par recently diagnosed with a right UPJ stone ~ 7 mm\par was on MET. started potassium citrate\par was in [place] last week, needed to go to the ER\par new CT (only report): stone still in the proximal ureter, two non-obstructing stones in the left kidney too (each 2 mm)\par documented urine PH 5.5\par creatinine 2.0, potassium 5.0\par \par reviewed the results with the patient and explained the concern\par now feeling much better, has not had pain in two days, no fever\par discussed the need for intervention, and repeat labs to confirm creatinine \par explained the need to go to the ER if labs worsen, any fever, unable to void\par if everything stable will plan for elective ureteroscopy, only for the right side\par will increase the dose of potassium citrate and will monitor the stones in the left kidney for now\par \par discussed ureteroscopy\par Risks, benefits and alternatives discussed. Procedure, in hospital stay and recovery explained. Risk of infection, multiple procedures, ureteral injury, ureteral stricture, incomplete stone clearance, sepsis, bleeding, and retention, all discussed.\par  \par \par plan:\par - increase potassium citrate, only after confirming normal potassium\par - labs today\par - will schedule ureteroscopy

## 2022-08-05 NOTE — ASSESSMENT
[FreeTextEntry1] : \par plan:\par - increase potassium citrate\par - labs today\par - will schedule ureteroscopy

## 2022-08-06 LAB
ANION GAP SERPL CALC-SCNC: 12 MMOL/L
BASOPHILS # BLD AUTO: 0.05 K/UL
BASOPHILS NFR BLD AUTO: 0.7 %
BUN SERPL-MCNC: 18 MG/DL
CALCIUM SERPL-MCNC: 9.6 MG/DL
CHLORIDE SERPL-SCNC: 101 MMOL/L
CO2 SERPL-SCNC: 25 MMOL/L
CREAT SERPL-MCNC: 1.12 MG/DL
EGFR: 90 ML/MIN/1.73M2
EOSINOPHIL # BLD AUTO: 0.18 K/UL
EOSINOPHIL NFR BLD AUTO: 2.4 %
GLUCOSE SERPL-MCNC: 208 MG/DL
HCT VFR BLD CALC: 37.5 %
HGB BLD-MCNC: 12 G/DL
IMM GRANULOCYTES NFR BLD AUTO: 0.5 %
LYMPHOCYTES # BLD AUTO: 2.2 K/UL
LYMPHOCYTES NFR BLD AUTO: 29.5 %
MAN DIFF?: NORMAL
MCHC RBC-ENTMCNC: 27.7 PG
MCHC RBC-ENTMCNC: 32 GM/DL
MCV RBC AUTO: 86.6 FL
MONOCYTES # BLD AUTO: 0.59 K/UL
MONOCYTES NFR BLD AUTO: 7.9 %
NEUTROPHILS # BLD AUTO: 4.4 K/UL
NEUTROPHILS NFR BLD AUTO: 59 %
PLATELET # BLD AUTO: 190 K/UL
POTASSIUM SERPL-SCNC: 5 MMOL/L
RBC # BLD: 4.33 M/UL
RBC # FLD: 13 %
SODIUM SERPL-SCNC: 139 MMOL/L
WBC # FLD AUTO: 7.46 K/UL

## 2022-08-10 ENCOUNTER — NON-APPOINTMENT (OUTPATIENT)
Age: 32
End: 2022-08-10

## 2022-08-12 ENCOUNTER — APPOINTMENT (OUTPATIENT)
Dept: ULTRASOUND IMAGING | Facility: CLINIC | Age: 32
End: 2022-08-12

## 2022-08-12 ENCOUNTER — OUTPATIENT (OUTPATIENT)
Dept: OUTPATIENT SERVICES | Facility: HOSPITAL | Age: 32
LOS: 1 days | End: 2022-08-12
Payer: COMMERCIAL

## 2022-08-12 DIAGNOSIS — Z90.49 ACQUIRED ABSENCE OF OTHER SPECIFIED PARTS OF DIGESTIVE TRACT: Chronic | ICD-10-CM

## 2022-08-12 DIAGNOSIS — N20.0 CALCULUS OF KIDNEY: ICD-10-CM

## 2022-08-12 DIAGNOSIS — Z00.8 ENCOUNTER FOR OTHER GENERAL EXAMINATION: ICD-10-CM

## 2022-08-12 PROCEDURE — 76770 US EXAM ABDO BACK WALL COMP: CPT | Mod: 26

## 2022-08-12 PROCEDURE — 76770 US EXAM ABDO BACK WALL COMP: CPT

## 2022-08-17 ENCOUNTER — APPOINTMENT (OUTPATIENT)
Dept: UROLOGY | Facility: CLINIC | Age: 32
End: 2022-08-17

## 2022-08-17 VITALS — SYSTOLIC BLOOD PRESSURE: 151 MMHG | DIASTOLIC BLOOD PRESSURE: 102 MMHG | HEART RATE: 97 BPM

## 2022-08-17 PROCEDURE — 99213 OFFICE O/P EST LOW 20 MIN: CPT

## 2022-08-18 ENCOUNTER — OUTPATIENT (OUTPATIENT)
Dept: OUTPATIENT SERVICES | Facility: HOSPITAL | Age: 32
LOS: 1 days | End: 2022-08-18
Payer: COMMERCIAL

## 2022-08-18 VITALS
HEART RATE: 91 BPM | DIASTOLIC BLOOD PRESSURE: 70 MMHG | WEIGHT: 315 LBS | HEIGHT: 68 IN | RESPIRATION RATE: 20 BRPM | OXYGEN SATURATION: 99 % | TEMPERATURE: 97 F | SYSTOLIC BLOOD PRESSURE: 130 MMHG

## 2022-08-18 DIAGNOSIS — N20.0 CALCULUS OF KIDNEY: ICD-10-CM

## 2022-08-18 DIAGNOSIS — E11.9 TYPE 2 DIABETES MELLITUS WITHOUT COMPLICATIONS: ICD-10-CM

## 2022-08-18 DIAGNOSIS — Z29.9 ENCOUNTER FOR PROPHYLACTIC MEASURES, UNSPECIFIED: ICD-10-CM

## 2022-08-18 DIAGNOSIS — Z90.49 ACQUIRED ABSENCE OF OTHER SPECIFIED PARTS OF DIGESTIVE TRACT: Chronic | ICD-10-CM

## 2022-08-18 DIAGNOSIS — Z01.818 ENCOUNTER FOR OTHER PREPROCEDURAL EXAMINATION: ICD-10-CM

## 2022-08-18 LAB
A1C WITH ESTIMATED AVERAGE GLUCOSE RESULT: 9.7 % — HIGH (ref 4–5.6)
ANION GAP SERPL CALC-SCNC: 13 MMOL/L — SIGNIFICANT CHANGE UP (ref 5–17)
APPEARANCE UR: ABNORMAL
BACTERIA # UR AUTO: ABNORMAL
BILIRUB UR-MCNC: NEGATIVE — SIGNIFICANT CHANGE UP
BUN SERPL-MCNC: 15.8 MG/DL — SIGNIFICANT CHANGE UP (ref 8–20)
CALCIUM SERPL-MCNC: 9.2 MG/DL — SIGNIFICANT CHANGE UP (ref 8.4–10.5)
CHLORIDE SERPL-SCNC: 98 MMOL/L — SIGNIFICANT CHANGE UP (ref 98–107)
CO2 SERPL-SCNC: 25 MMOL/L — SIGNIFICANT CHANGE UP (ref 22–29)
COLOR SPEC: YELLOW — SIGNIFICANT CHANGE UP
CREAT SERPL-MCNC: 1.11 MG/DL — SIGNIFICANT CHANGE UP (ref 0.5–1.3)
DIFF PNL FLD: ABNORMAL
EGFR: 90 ML/MIN/1.73M2 — SIGNIFICANT CHANGE UP
EPI CELLS # UR: SIGNIFICANT CHANGE UP
ESTIMATED AVERAGE GLUCOSE: 232 MG/DL — HIGH (ref 68–114)
GLUCOSE SERPL-MCNC: 198 MG/DL — HIGH (ref 70–99)
GLUCOSE UR QL: NEGATIVE MG/DL — SIGNIFICANT CHANGE UP
HCT VFR BLD CALC: 40.4 % — SIGNIFICANT CHANGE UP (ref 39–50)
HGB BLD-MCNC: 13.3 G/DL — SIGNIFICANT CHANGE UP (ref 13–17)
KETONES UR-MCNC: NEGATIVE — SIGNIFICANT CHANGE UP
LEUKOCYTE ESTERASE UR-ACNC: NEGATIVE — SIGNIFICANT CHANGE UP
MCHC RBC-ENTMCNC: 27 PG — SIGNIFICANT CHANGE UP (ref 27–34)
MCHC RBC-ENTMCNC: 32.9 GM/DL — SIGNIFICANT CHANGE UP (ref 32–36)
MCV RBC AUTO: 81.9 FL — SIGNIFICANT CHANGE UP (ref 80–100)
NITRITE UR-MCNC: NEGATIVE — SIGNIFICANT CHANGE UP
PH UR: 6 — SIGNIFICANT CHANGE UP (ref 5–8)
PLATELET # BLD AUTO: 237 K/UL — SIGNIFICANT CHANGE UP (ref 150–400)
POTASSIUM SERPL-MCNC: 4.3 MMOL/L — SIGNIFICANT CHANGE UP (ref 3.5–5.3)
POTASSIUM SERPL-SCNC: 4.3 MMOL/L — SIGNIFICANT CHANGE UP (ref 3.5–5.3)
PROT UR-MCNC: 30 MG/DL
RBC # BLD: 4.93 M/UL — SIGNIFICANT CHANGE UP (ref 4.2–5.8)
RBC # FLD: 13.4 % — SIGNIFICANT CHANGE UP (ref 10.3–14.5)
RBC CASTS # UR COMP ASSIST: ABNORMAL /HPF (ref 0–4)
SODIUM SERPL-SCNC: 136 MMOL/L — SIGNIFICANT CHANGE UP (ref 135–145)
SP GR SPEC: 1.01 — SIGNIFICANT CHANGE UP (ref 1.01–1.02)
UROBILINOGEN FLD QL: NEGATIVE MG/DL — SIGNIFICANT CHANGE UP
WBC # BLD: 9.58 K/UL — SIGNIFICANT CHANGE UP (ref 3.8–10.5)
WBC # FLD AUTO: 9.58 K/UL — SIGNIFICANT CHANGE UP (ref 3.8–10.5)
WBC UR QL: SIGNIFICANT CHANGE UP /HPF (ref 0–5)

## 2022-08-18 PROCEDURE — 93010 ELECTROCARDIOGRAM REPORT: CPT

## 2022-08-18 PROCEDURE — 93005 ELECTROCARDIOGRAM TRACING: CPT

## 2022-08-18 PROCEDURE — G0463: CPT

## 2022-08-18 RX ORDER — CEFAZOLIN SODIUM 1 G
2000 VIAL (EA) INJECTION ONCE
Refills: 0 | Status: DISCONTINUED | OUTPATIENT
Start: 2022-08-25 | End: 2022-09-09

## 2022-08-18 NOTE — H&P PST ADULT - LAB RESULTS AND INTERPRETATION
NSR 95 BPM Pending official reading Labs reviewed.  Urine culture pending  Labs sent to Dr Payton NP/Pa for review.  Pt also needs medical clearance Labs will be sent to pts PCP/Pul

## 2022-08-18 NOTE — H&P PST ADULT - TEMPERATURE IN FAHRENHEIT (DEGREES F)
Problem: Patient Care Overview  Goal: Plan of Care Review  Outcome: Ongoing (interventions implemented as appropriate)   02/20/19 1554   Coping/Psychosocial   Plan of Care Reviewed With patient   Plan of Care Review   Progress improving   OTHER   Outcome Summary Patient receiving 1 unit of PRBC this shift with labs to be drawn after infusion complete. VSS. AFib on tele. Patient states SOA is improved.        Problem: Fall Risk (Adult)  Goal: Identify Related Risk Factors and Signs and Symptoms  Outcome: Ongoing (interventions implemented as appropriate)    Goal: Absence of Fall  Outcome: Ongoing (interventions implemented as appropriate)      Problem: Anemia (Adult)  Goal: Symptom Improvement  Outcome: Ongoing (interventions implemented as appropriate)         97.4

## 2022-08-18 NOTE — HISTORY OF PRESENT ILLNESS
[FreeTextEntry1] : 32 yo M with nephrolithiasis\par see previous notes \par \par new ultrasound: still no report - still see hydro\par still discomfort and nausea \par planned for surgery as discussed\par already has a date\par answered all questions

## 2022-08-18 NOTE — H&P PST ADULT - NSICDXPASTMEDICALHX_GEN_ALL_CORE_FT
PAST MEDICAL HISTORY:  Asthma     DM2 (diabetes mellitus, type 2)     Kidney stones     Sleep apnea, obstructive

## 2022-08-18 NOTE — H&P PST ADULT - HISTORY OF PRESENT ILLNESS
32 yo presents to PST for screening for right sided flank pain.  Pt states he has been having pain for the past one month.. Pt states he has been having blood in his urine, and nausea in the am.  Pt pain level 9/10to 4/10.  Pt scheduled for a right ureteroscopy, laser lithotripsy with Dr. Payton, on 8/25//2022. 32 yo presents to PST for screening for right sided flank pain.  Pt states he has been having pain for the past one month.. Pt states he has been having blood in his urine, and nausea in the am.  Pt pain level 9/10to 4/10.  Pt scheduled for a right ureteroscopy, laser lithotripsy with Dr. Payton, on 8/25//2022.    ** patient tested with high glucose and HgA1C 9.7. discussed this with the patient and the need to optimized results before surgery. will be scheduled only for insertion of stent now, and ureteroscopy at a later date. patient understands and agrees to plan

## 2022-08-18 NOTE — H&P PST ADULT - ASSESSMENT
32 yo presents to PST for screening for right sided flank pain.  Pt states he has been having pain for the past one month.. Pt states he has been having blood in his urine, and nausea in the am.  Pt pain level 9/10to 4/10.  Pt scheduled for a right ureteroscopy, laser lithotripsy with Dr. Payton, on 2022.    Patient was educated on preoperative preperation with written and verbal instruction . Patient is going for medical clearance with Dr. Small  patient will review medications with PCP. Patient was educated on aspirin and aspirin products NSAIDS ,vitamins and herbals that increase the risk of bleeding and need to be stopped five days before procedure  . Patient was also educated on covid testing and covid prevention ,social distancing and wearing a mask.     OPIOID RISK TOOL    PATRICIA EACH BOX THAT APPLIES AND ADD TOTALS AT THE END    FAMILY HISTORY OF SUBSTANCE ABUSE                 FEMALE         MALE                                                Alcohol                             [  ]1 pt          [  ]3pts                                               Illegal Durgs                     [  ]2 pts        [  ]3pts                                               Rx Drugs                           [  ]4 pts        [  ]4 pts    PERSONAL HISTORY OF SUBSTANCE ABUSE                                                                                          Alcohol                             [  ]3 pts       [  ]3 pts                                               Illegal Drugs                     [  ]4 pts        [  ]4 pts                                               Rx Drugs                           [  ]5 pts        [  ]5 pts    AGE BETWEEN 16-45 YEARS                                      [  ]1 pt         [  ]1 pt    HISTORY OF PREADOLESCENT   SEXUAL ABUSE                                                             [  ]3 pts        [  ]0pts    PSYCHOLOGICAL DISEASE                     ADD, OCD, Bipolar, Schizophrenia        [  ]2 pts         [  ]2 pts                      Depression                                               [  ]1 pt           [  ]1 pt           SCORING TOTAL   (add numbers and type here)         0                                     CAPRINI SCORE [CLOT]    AGE RELATED RISK FACTORS                                                       MOBILITY RELATED FACTORS  [ ] Age 41-60 years                                            (1 Point)                  [ ] Bed rest                                                        (1 Point)  [ ] Age: 61-74 years                                           (2 Points)                 [ ] Plaster cast                                                   (2 Points)  [ ] Age= 75 years                                              (3 Points)                 [ ] Bed bound for more than 72 hours                 (2 Points)    DISEASE RELATED RISK FACTORS                                               GENDER SPECIFIC FACTORS  [x ] Edema in the lower extremities                       (1 Point)                  [ ] Pregnancy                                                     (1 Point)  [ ] Varicose veins                                               (1 Point)                  [ ] Post-partum < 6 weeks                                   (1 Point)             [ ] BMI > 25 Kg/m2                                            (1 Point)                  [ ] Hormonal therapy  or oral contraception          (1 Point)                 [ ] Sepsis (in the previous month)                        (1 Point)                  [ ] History of pregnancy complications                 (1 point)  [ ] Pneumonia or serious lung disease                                               [ ] Unexplained or recurrent                     (1 Point)           (in the previous month)                               (1 Point)  [ ] Abnormal pulmonary function test                     (1 Point)                 SURGERY RELATED RISK FACTORS  [ ] Acute myocardial infarction                              (1 Point)                 [ ]  Section                                             (1 Point)  [ ] Congestive heart failure (in the previous month)  (1 Point)               [ ] Minor surgery                                                  (1 Point)   [ ] Inflammatory bowel disease                             (1 Point)                 [ ] Arthroscopic surgery                                        (2 Points)  [ ] Central venous access                                      (2 Points)                [ x] General surgery lasting more than 45 minutes   (2 Points)       [ ] Stroke (in the previous month)                          (5 Points)               [ ] Elective arthroplasty                                         (5 Points)                                                                                                                                               HEMATOLOGY RELATED FACTORS                                                 TRAUMA RELATED RISK FACTORS  [ ] Prior episodes of VTE                                     (3 Points)                [ ] Fracture of the hip, pelvis, or leg                       (5 Points)  [ ] Positive family history for VTE                         (3 Points)                 [ ] Acute spinal cord injury (in the previous month)  (5 Points)  [ ] Prothrombin 53569 A                                     (3 Points)                 [ ] Paralysis  (less than 1 month)                             (5 Points)  [ ] Factor V Leiden                                             (3 Points)                  [ ] Multiple Trauma within 1 month                        (5 Points)  [ ] Lupus anticoagulants                                     (3 Points)                                                           [ ] Anticardiolipin antibodies                               (3 Points)                                                       [ ] High homocysteine in the blood                      (3 Points)                                             [ ] Other congenital or acquired thrombophilia      (3 Points)                                                [ ] Heparin induced thrombocytopenia                  (3 Points)                                          Total Score [   3       ]    PT COVID vaccinated  Pt aware to arrange COVID screening

## 2022-08-19 LAB
CULTURE RESULTS: SIGNIFICANT CHANGE UP
SPECIMEN SOURCE: SIGNIFICANT CHANGE UP

## 2022-08-24 ENCOUNTER — TRANSCRIPTION ENCOUNTER (OUTPATIENT)
Age: 32
End: 2022-08-24

## 2022-08-24 ENCOUNTER — RX RENEWAL (OUTPATIENT)
Age: 32
End: 2022-08-24

## 2022-08-24 LAB
NIDUS STONE QN: NORMAL
SARS-COV-2 N GENE NPH QL NAA+PROBE: NOT DETECTED

## 2022-08-25 ENCOUNTER — TRANSCRIPTION ENCOUNTER (OUTPATIENT)
Age: 32
End: 2022-08-25

## 2022-08-25 ENCOUNTER — APPOINTMENT (OUTPATIENT)
Dept: UROLOGY | Facility: HOSPITAL | Age: 32
End: 2022-08-25

## 2022-08-25 ENCOUNTER — OUTPATIENT (OUTPATIENT)
Dept: INPATIENT UNIT | Facility: HOSPITAL | Age: 32
LOS: 1 days | End: 2022-08-25
Payer: COMMERCIAL

## 2022-08-25 VITALS
HEIGHT: 73.84 IN | SYSTOLIC BLOOD PRESSURE: 120 MMHG | RESPIRATION RATE: 16 BRPM | DIASTOLIC BLOOD PRESSURE: 80 MMHG | HEART RATE: 76 BPM | TEMPERATURE: 98 F | OXYGEN SATURATION: 99 % | WEIGHT: 204.81 LBS

## 2022-08-25 VITALS
HEART RATE: 91 BPM | OXYGEN SATURATION: 98 % | SYSTOLIC BLOOD PRESSURE: 154 MMHG | RESPIRATION RATE: 20 BRPM | DIASTOLIC BLOOD PRESSURE: 87 MMHG

## 2022-08-25 DIAGNOSIS — Z90.49 ACQUIRED ABSENCE OF OTHER SPECIFIED PARTS OF DIGESTIVE TRACT: Chronic | ICD-10-CM

## 2022-08-25 DIAGNOSIS — N20.0 CALCULUS OF KIDNEY: ICD-10-CM

## 2022-08-25 LAB
BLD GP AB SCN SERPL QL: SIGNIFICANT CHANGE UP
GLUCOSE BLDC GLUCOMTR-MCNC: 199 MG/DL — HIGH (ref 70–99)

## 2022-08-25 PROCEDURE — C1769: CPT

## 2022-08-25 PROCEDURE — 82962 GLUCOSE BLOOD TEST: CPT

## 2022-08-25 PROCEDURE — 36415 COLL VENOUS BLD VENIPUNCTURE: CPT

## 2022-08-25 PROCEDURE — 52332 CYSTOSCOPY AND TREATMENT: CPT | Mod: RT

## 2022-08-25 PROCEDURE — 86901 BLOOD TYPING SEROLOGIC RH(D): CPT

## 2022-08-25 PROCEDURE — 74420 UROGRAPHY RTRGR +-KUB: CPT | Mod: 26,RT

## 2022-08-25 PROCEDURE — 86900 BLOOD TYPING SEROLOGIC ABO: CPT

## 2022-08-25 PROCEDURE — 86850 RBC ANTIBODY SCREEN: CPT

## 2022-08-25 PROCEDURE — C1758: CPT

## 2022-08-25 PROCEDURE — 76000 FLUOROSCOPY <1 HR PHYS/QHP: CPT

## 2022-08-25 PROCEDURE — C2617: CPT

## 2022-08-25 DEVICE — STENT URET PERCFLX PLUS 6X26 W/O GDWIRE: Type: IMPLANTABLE DEVICE | Status: FUNCTIONAL

## 2022-08-25 DEVICE — CATH URET AXCESS 6FR 70CM: Type: IMPLANTABLE DEVICE | Status: FUNCTIONAL

## 2022-08-25 DEVICE — WIRE SENSORFLX STR .035 X 150CM: Type: IMPLANTABLE DEVICE | Status: FUNCTIONAL

## 2022-08-25 DEVICE — LASER FIBER FLEXIVA 365: Type: IMPLANTABLE DEVICE | Status: FUNCTIONAL

## 2022-08-25 DEVICE — GWIRE SENS NIT 0.035INX150CM: Type: IMPLANTABLE DEVICE | Status: FUNCTIONAL

## 2022-08-25 DEVICE — LASER FIBER FLEXIVA 242 HI POWER: Type: IMPLANTABLE DEVICE | Status: FUNCTIONAL

## 2022-08-25 DEVICE — BASKET ZEROTIP NITINOL 1.9FR 120CM X 12MM 4 WIRES: Type: IMPLANTABLE DEVICE | Status: FUNCTIONAL

## 2022-08-25 RX ORDER — SODIUM CHLORIDE 9 MG/ML
3 INJECTION INTRAMUSCULAR; INTRAVENOUS; SUBCUTANEOUS ONCE
Refills: 0 | Status: DISCONTINUED | OUTPATIENT
Start: 2022-08-25 | End: 2022-08-25

## 2022-08-25 RX ORDER — ONDANSETRON 8 MG/1
4 TABLET, FILM COATED ORAL ONCE
Refills: 0 | Status: DISCONTINUED | OUTPATIENT
Start: 2022-08-25 | End: 2022-08-25

## 2022-08-25 RX ORDER — TAMSULOSIN HYDROCHLORIDE 0.4 MG/1
1 CAPSULE ORAL
Qty: 14 | Refills: 0
Start: 2022-08-25 | End: 2022-09-07

## 2022-08-25 RX ORDER — OXYBUTYNIN CHLORIDE 5 MG
1 TABLET ORAL
Qty: 14 | Refills: 0
Start: 2022-08-25 | End: 2022-09-07

## 2022-08-25 RX ORDER — SODIUM CHLORIDE 9 MG/ML
1000 INJECTION, SOLUTION INTRAVENOUS
Refills: 0 | Status: DISCONTINUED | OUTPATIENT
Start: 2022-08-25 | End: 2022-08-25

## 2022-08-25 RX ORDER — HYDROMORPHONE HYDROCHLORIDE 2 MG/ML
0.5 INJECTION INTRAMUSCULAR; INTRAVENOUS; SUBCUTANEOUS
Refills: 0 | Status: DISCONTINUED | OUTPATIENT
Start: 2022-08-25 | End: 2022-08-25

## 2022-08-25 RX ORDER — ACETAMINOPHEN 500 MG
975 TABLET ORAL ONCE
Refills: 0 | Status: COMPLETED | OUTPATIENT
Start: 2022-08-25 | End: 2022-08-25

## 2022-08-25 RX ADMIN — Medication 975 MILLIGRAM(S): at 15:42

## 2022-08-25 NOTE — ASU DISCHARGE PLAN (ADULT/PEDIATRIC) - CALL YOUR DOCTOR IF YOU HAVE ANY OF THE FOLLOWING:
Bleeding that does not stop/Swelling that gets worse/Pain not relieved by Medications/Wound/Surgical Site with redness, or foul smelling discharge or pus/Numbness, tingling, color or temperature change to extremity/Nausea and vomiting that does not stop/Unable to urinate/Excessive diarrhea/Inability to tolerate liquids or foods

## 2022-08-25 NOTE — BRIEF OPERATIVE NOTE - PRIMARY SURGEON
eddie I have personally seen and examined this patient. I have fully participated in the care of this patient. I have reviewed all pertinent clinical information, including history physical exam, plan and the Resident's note and agree except as noted

## 2022-08-25 NOTE — ASU DISCHARGE PLAN (ADULT/PEDIATRIC) - NS MD DC FALL RISK RISK
For information on Fall & Injury Prevention, visit: [email or web address] OR  [email or web address] OR  https://www.cdc.gov/steadi/patient.html

## 2022-08-25 NOTE — ASU DISCHARGE PLAN (ADULT/PEDIATRIC) - ASU DC SPECIAL INSTRUCTIONSFT
Take meds as prescribed  you may take tylenol and/or motrin for pain  f/ in 2 weeks  f/u with endocrine  notify your surgeon of any concerning symotoms

## 2022-08-26 DIAGNOSIS — Z96.0 PRESENCE OF UROGENITAL IMPLANTS: ICD-10-CM

## 2022-08-26 PROBLEM — J45.909 UNSPECIFIED ASTHMA, UNCOMPLICATED: Chronic | Status: ACTIVE | Noted: 2022-08-18

## 2022-08-26 PROBLEM — G47.33 OBSTRUCTIVE SLEEP APNEA (ADULT) (PEDIATRIC): Chronic | Status: ACTIVE | Noted: 2022-08-18

## 2022-09-14 ENCOUNTER — APPOINTMENT (OUTPATIENT)
Dept: UROLOGY | Facility: CLINIC | Age: 32
End: 2022-09-14

## 2022-09-14 PROCEDURE — 99442: CPT

## 2022-09-14 NOTE — HISTORY OF PRESENT ILLNESS
[Home] : at home, [unfilled] , at the time of the visit. [Medical Office: ([facility])___] : at the medical office located in  [Verbal consent obtained from patient] : the patient, [unfilled] [FreeTextEntry1] : 33 yo M \par \par known 6-7 mm proximal urteerla stone\par was planned for ureteroscopy\par A1C was ~ 10, discussed risks with patient and only inserted a stent\par now with ureteral sten\par seeing endocrinology and better controlling his DM\par already lost more than 10 pounds\par \par scheduled for repeat follow up and testing on 10/15/2022\par \par plan: \par will schedule TTM after 10/20/2022 to review results and decide on time for surgery

## 2022-09-14 NOTE — ASSESSMENT
[FreeTextEntry1] : \par plan: \par will schedule TTM after 10/20/2022 to review results and decide on time for surgery

## 2022-10-26 ENCOUNTER — APPOINTMENT (OUTPATIENT)
Dept: UROLOGY | Facility: CLINIC | Age: 32
End: 2022-10-26

## 2022-10-26 PROCEDURE — 99442: CPT

## 2022-10-26 NOTE — HISTORY OF PRESENT ILLNESS
[Home] : at home, [unfilled] , at the time of the visit. [Medical Office: ([facility])___] : at the medical office located in  [Verbal consent obtained from patient] : the patient, [unfilled] [FreeTextEntry1] : \par 31 yo M for follow up\par previously 7 mm right proximal ureteral stone - with stent\par was planned for surgery - cancelled due to elevated HgA1c\par \par recent A1C - 8.5\par patient says he even had a repeat one with A1C 6.6\par already lost 25.5 pounds\par \par \par plan:\par - right ureteroscopy\par

## 2022-10-27 ENCOUNTER — NON-APPOINTMENT (OUTPATIENT)
Age: 32
End: 2022-10-27

## 2022-11-07 ENCOUNTER — OUTPATIENT (OUTPATIENT)
Dept: OUTPATIENT SERVICES | Facility: HOSPITAL | Age: 32
LOS: 1 days | End: 2022-11-07
Payer: COMMERCIAL

## 2022-11-07 VITALS
WEIGHT: 315 LBS | DIASTOLIC BLOOD PRESSURE: 74 MMHG | SYSTOLIC BLOOD PRESSURE: 122 MMHG | TEMPERATURE: 96 F | HEIGHT: 68 IN | RESPIRATION RATE: 18 BRPM | OXYGEN SATURATION: 97 % | HEART RATE: 84 BPM

## 2022-11-07 DIAGNOSIS — Z87.81 PERSONAL HISTORY OF (HEALED) TRAUMATIC FRACTURE: Chronic | ICD-10-CM

## 2022-11-07 DIAGNOSIS — Z90.49 ACQUIRED ABSENCE OF OTHER SPECIFIED PARTS OF DIGESTIVE TRACT: Chronic | ICD-10-CM

## 2022-11-07 DIAGNOSIS — G47.33 OBSTRUCTIVE SLEEP APNEA (ADULT) (PEDIATRIC): ICD-10-CM

## 2022-11-07 DIAGNOSIS — E11.9 TYPE 2 DIABETES MELLITUS WITHOUT COMPLICATIONS: ICD-10-CM

## 2022-11-07 DIAGNOSIS — E66.01 MORBID (SEVERE) OBESITY DUE TO EXCESS CALORIES: ICD-10-CM

## 2022-11-07 DIAGNOSIS — Z29.9 ENCOUNTER FOR PROPHYLACTIC MEASURES, UNSPECIFIED: ICD-10-CM

## 2022-11-07 DIAGNOSIS — Z96.0 PRESENCE OF UROGENITAL IMPLANTS: Chronic | ICD-10-CM

## 2022-11-07 DIAGNOSIS — N20.0 CALCULUS OF KIDNEY: ICD-10-CM

## 2022-11-07 DIAGNOSIS — J45.909 UNSPECIFIED ASTHMA, UNCOMPLICATED: ICD-10-CM

## 2022-11-07 DIAGNOSIS — Z01.818 ENCOUNTER FOR OTHER PREPROCEDURAL EXAMINATION: ICD-10-CM

## 2022-11-07 PROCEDURE — G0463: CPT

## 2022-11-07 PROCEDURE — 71046 X-RAY EXAM CHEST 2 VIEWS: CPT

## 2022-11-07 PROCEDURE — 71046 X-RAY EXAM CHEST 2 VIEWS: CPT | Mod: 26

## 2022-11-07 RX ORDER — POTASSIUM CITRATE MONOHYDRATE 100 %
1 POWDER (GRAM) MISCELLANEOUS
Qty: 0 | Refills: 0 | DISCHARGE

## 2022-11-07 RX ORDER — IBUPROFEN 200 MG
1 TABLET ORAL
Qty: 0 | Refills: 0 | DISCHARGE

## 2022-11-07 RX ORDER — ONDANSETRON 8 MG/1
1 TABLET, FILM COATED ORAL
Qty: 0 | Refills: 0 | DISCHARGE

## 2022-11-07 RX ORDER — SODIUM BICARBONATE/CITRIC ACID 0.78-1.82G
650 GRANULES, EFFERVESCENT ORAL
Qty: 0 | Refills: 0 | DISCHARGE

## 2022-11-07 NOTE — H&P PST ADULT - ASSESSMENT
31 y/o male presents today to PST pending cystoscopy with right ureteroscopy laser lithotripsy with Dr. David Payton on 11/15/22 secondary to calculus of kidney. Pt. states kidney stone began 2022, pt. states he went to the ER at that time, had a CT scan and was discharged home and saw Dr. Payton, states he was waiting for spontaneous passing under physician guidance which it did not, he has had multiple ultrasounds he states, it was then recommended that he have a stent placed on the right that he received on 22 successfully denies dysuria, hematuria, denies n/v/d, now presents for aforementioned procedure. Pt. states he has intermittent back pain that is muscular in nature he feels and not related to kidney stones. Pt. with history of type 2 DM, morbid obesity, ARACELIS - CPAP, seasonal asthma, has a rescue inhaler, states it was last used one time 3 days ago due to SOB that resolved, states he was resting when he felt SOB, denies cough or wheeze, denies hospitalizations for his asthma. States his most recent A1C was 6.2.  BMI 52.0.    Pt. to have medical clearance with Dr. Frias, pt. states he was already seen, office to be contacted to obtain clearance from office, pt. verbalized agreement and understanding.   Pt. educated and instructed regarding all preoperative instructions and education as per policy via both verbal and written means of communication and pt. verbalized agreement and understanding.  Patient educated on surgical scrub, COVID testing-pt. states he has arrangements for preoperative COVID test as per surgeon and policy, preadmission instructions, medical clearance and day of procedure medications as per policy and pt. verbalizes understanding and agreement.  Pt. instructed to stop vitamins/supplements/herbal medications/NSAIDS for one week prior to surgery and pt. verbalized agreement and understanding.   Pt. advised to discuss preoperative metformin hold with PCP/Endocrine and pt. verbalized agreement and understanding.     OPIOID RISK TOOL    PATRICIA EACH BOX THAT APPLIES AND ADD TOTALS AT THE END    FAMILY HISTORY OF SUBSTANCE ABUSE                 FEMALE         MALE                                                Alcohol                             [  ]1 pt          [  ]3pts                                               Illegal Durgs                     [  ]2 pts        [  ]3pts                                               Rx Drugs                           [  ]4 pts        [  ]4 pts    PERSONAL HISTORY OF SUBSTANCE ABUSE                                                                                          Alcohol                             [  ]3 pts       [  ]3 pts                                               Illegal Drugs                     [  ]4 pts        [  ]4 pts                                               Rx Drugs                           [  ]5 pts        [  ]5 pts    AGE BETWEEN 16-45 YEARS                                      [  ]1 pt         [  ]1 pt    HISTORY OF PREADOLESCENT   SEXUAL ABUSE                                                             [  ]3 pts        [  ]0pts    PSYCHOLOGICAL DISEASE                     ADD, OCD, Bipolar, Schizophrenia        [  ]2 pts         [  ]2 pts                      Depression                                               [  ]1 pt           [  ]1 pt           SCORING TOTAL   (add numbers and type here)              (0)                                     A score of 3 or lower indicated LOW risk for future opioid abuse  A score of 4 to 7 indicated moderate risk for future opioid abuse  A score of 8 or higher indicates a high risk for opioid abuse    CAPRINI SCORE    AGE RELATED RISK FACTORS                                                             [ ] Age 41-60 years                                            (1 Point)  [ ] Age: 61-74 years                                           (2 Points)                 [ ] Age= 75 years                                                (3 Points)             DISEASE RELATED RISK FACTORS                                                       [ ] Edema in the lower extremities                 (1 Point)                     [ ] Varicose veins                                               (1 Point)                                 [x ] BMI > 25 Kg/m2                                            (1 Point)                                  [ ] Serious infection (ie PNA)                            (1 Point)                     [ ] Lung disease ( COPD, Emphysema)            (1 Point)                                                                          [ ] Acute myocardial infarction                         (1 Point)                  [ ] Congestive heart failure (in the previous month)  (1 Point)         [ ] Inflammatory bowel disease                            (1 Point)                  [ ] Central venous access, PICC or Port               (2 points)       (within the last month)                                                                [ ] Stroke (in the previous month)                        (5 Points)    [ ] Previous or present malignancy                       (2 points)                                                                                                                                                         HEMATOLOGY RELATED FACTORS                                                         [ ] Prior episodes of VTE                                     (3 Points)                     [ ] Positive family history for VTE                      (3 Points)                  [ ] Prothrombin 25632 A                                     (3 Points)                     [ ] Factor V Leiden                                                (3 Points)                        [ ] Lupus anticoagulants                                      (3 Points)                                                           [ ] Anticardiolipin antibodies                              (3 Points)                                                       [ ] High homocysteine in the blood                   (3 Points)                                             [ ] Other congenital or acquired thrombophilia      (3 Points)                                                [ ] Heparin induced thrombocytopenia                  (3 Points)                                        MOBILITY RELATED FACTORS  [ ] Bed rest                                                         (1 Point)  [ ] Plaster cast                                                    (2 points)  [ ] Bed bound for more than 72 hours           (2 Points)    GENDER SPECIFIC FACTORS  [ ] Pregnancy or had a baby within the last month   (1 Point)  [ ] Post-partum < 6 weeks                                   (1 Point)  [ ] Hormonal therapy  or oral contraception   (1 Point)  [ ] History of pregnancy complications              (1 point)  [ ] Unexplained or recurrent              (1 Point)    OTHER RISK FACTORS                                           (1 Point)  [x ] BMI >40, smoking, diabetes requiring insulin, chemotherapy  blood transfusions and length of surgery over 2 hours    SURGERY RELATED RISK FACTORS  [ ]  Section within the last month     (1 Point)  [ ] Minor surgery                                                  (1 Point)  [ ] Arthroscopic surgery                                       (2 Points)  [ x] Planned major surgery lasting more            (2 Points)      than 45 minutes     [ ] Elective hip or knee joint replacement       (5 points)       surgery                                                TRAUMA RELATED RISK FACTORS  [ ] Fracture of the hip, pelvis, or leg                       (5 Points)  [ ] Spinal cord injury resulting in paralysis             (5 points)       (in the previous month)    [ ] Paralysis  (less than 1 month)                             (5 Points)  [ ] Multiple Trauma within 1 month                        (5 Points)    Total Score [    4    ]    Caprini Score 0-2: Low Risk, NO VTE prophylaxis required for most patients, encourage ambulation  Caprini Score 3-6: Moderate Risk , pharmacologic VTE prophylaxis is indicated for most patients (in the absence of contraindications)  Caprini Score Greater than or =7: High risk, pharmocologic VTE prophylaxis indicated for most patients (in the absence of contraindications)

## 2022-11-07 NOTE — H&P PST ADULT - NSICDXPASTMEDICALHX_GEN_ALL_CORE_FT
PAST MEDICAL HISTORY:  Asthma     Calculus of kidney     COVID-19 vaccine series completed     DM2 (diabetes mellitus, type 2)     Kidney stones     Obesity, morbid     Sleep apnea, obstructive      PAST MEDICAL HISTORY:  Asthma     Calculus of kidney     COVID-19 vaccine series completed     DM2 (diabetes mellitus, type 2)     Kidney stones     Obesity, morbid     Seasonal allergies     Sleep apnea, obstructive

## 2022-11-07 NOTE — H&P PST ADULT - GASTROINTESTINAL
negative normal/soft/nontender/nondistended/normal active bowel sounds/no guarding/no rigidity/no organomegaly/no palpable misha/no masses palpable

## 2022-11-07 NOTE — H&P PST ADULT - ACTIVITY
able to climb 1 flight of stairs without becoming SOB and without CP, denies CP or SOB with mild physical exertion

## 2022-11-07 NOTE — H&P PST ADULT - HISTORY OF PRESENT ILLNESS
31 y/o male presents today to [facility] pending cystoscopy with right ureteroscopy laser lithotripsy with Dr. David Payton on 11/15/22 secondary to calculus of kidney. Pt. states kidney stone began 6/2022, pt. states he went to the ER at that time, had a CT scan and was discharged home and saw Dr. Payton, states he was waiting for spontaneous passing under physician guidance which it did not, he has had multiple ultrasounds he states, it was then recommended that he have a stent placed on the right that he received on 8/25/22 successfully denies dysuria, hematuria, denies n/v/d, now presents for aforementioned procedure. Pt. states he has intermittent back pain that is muscular in nature he feels and not related to kidney stones. Pt. with history of type 2 DM, morbid obesity, ARACELIS - CPAP, seasonal asthma, has a rescue inhaler, states it was last used one time 3 days ago due to SOB that resolved, states he was resting when he felt SOB, denies cough or wheeze, denies hospitalizations for his asthma. States his most recent A1C was 6.2.

## 2022-11-07 NOTE — H&P PST ADULT - PROBLEM SELECTOR PLAN 5
Medical clearance pending for  cystoscopy with right ureteroscopy laser lithotripsy with Dr. David Payton on 11/15/22 secondary to calculus of kidney.

## 2022-11-07 NOTE — H&P PST ADULT - NSICDXPASTSURGICALHX_GEN_ALL_CORE_FT
PAST SURGICAL HISTORY:  History of broken nose repaired    S/P cholecystectomy 2011    Ureteral stent present right

## 2022-11-08 LAB
ALBUMIN SERPL ELPH-MCNC: 4.3 G/DL — SIGNIFICANT CHANGE UP (ref 3.3–5)
ALP SERPL-CCNC: 86 U/L — SIGNIFICANT CHANGE UP (ref 40–120)
ALT FLD-CCNC: 37 U/L — SIGNIFICANT CHANGE UP (ref 10–45)
ANION GAP SERPL CALC-SCNC: 13 MMOL/L — SIGNIFICANT CHANGE UP (ref 5–17)
APPEARANCE UR: ABNORMAL
APTT BLD: 34.6 SEC — SIGNIFICANT CHANGE UP (ref 27.5–35.5)
AST SERPL-CCNC: 29 U/L — SIGNIFICANT CHANGE UP (ref 10–40)
BACTERIA # UR AUTO: NEGATIVE — SIGNIFICANT CHANGE UP
BILIRUB SERPL-MCNC: 0.4 MG/DL — SIGNIFICANT CHANGE UP (ref 0.2–1.2)
BILIRUB UR-MCNC: NEGATIVE — SIGNIFICANT CHANGE UP
BUN SERPL-MCNC: 18 MG/DL — SIGNIFICANT CHANGE UP (ref 7–23)
CALCIUM SERPL-MCNC: 9.9 MG/DL — SIGNIFICANT CHANGE UP (ref 8.4–10.5)
CHLORIDE SERPL-SCNC: 101 MMOL/L — SIGNIFICANT CHANGE UP (ref 96–108)
CO2 SERPL-SCNC: 25 MMOL/L — SIGNIFICANT CHANGE UP (ref 22–31)
COLOR SPEC: ABNORMAL
COMMENT - URINE: SIGNIFICANT CHANGE UP
CREAT SERPL-MCNC: 0.78 MG/DL — SIGNIFICANT CHANGE UP (ref 0.5–1.3)
CULTURE RESULTS: SIGNIFICANT CHANGE UP
DIFF PNL FLD: ABNORMAL
EGFR: 121 ML/MIN/1.73M2 — SIGNIFICANT CHANGE UP
EPI CELLS # UR: 2 /HPF — SIGNIFICANT CHANGE UP (ref 0–5)
GLUCOSE SERPL-MCNC: 136 MG/DL — HIGH (ref 70–99)
GLUCOSE UR QL: NEGATIVE — SIGNIFICANT CHANGE UP
HYALINE CASTS # UR AUTO: 10 /LPF — HIGH (ref 0–7)
INR BLD: 1.06 RATIO — SIGNIFICANT CHANGE UP (ref 0.88–1.16)
KETONES UR-MCNC: NEGATIVE — SIGNIFICANT CHANGE UP
LEUKOCYTE ESTERASE UR-ACNC: ABNORMAL
NITRITE UR-MCNC: NEGATIVE — SIGNIFICANT CHANGE UP
PH UR: 6 — SIGNIFICANT CHANGE UP (ref 5–8)
POTASSIUM SERPL-MCNC: 4.3 MMOL/L — SIGNIFICANT CHANGE UP (ref 3.5–5.3)
POTASSIUM SERPL-SCNC: 4.3 MMOL/L — SIGNIFICANT CHANGE UP (ref 3.5–5.3)
PROT SERPL-MCNC: 7.3 G/DL — SIGNIFICANT CHANGE UP (ref 6–8.3)
PROT UR-MCNC: ABNORMAL
PROTHROM AB SERPL-ACNC: 12.3 SEC — SIGNIFICANT CHANGE UP (ref 10.5–13.4)
RBC CASTS # UR COMP ASSIST: 150 /HPF — HIGH (ref 0–4)
SODIUM SERPL-SCNC: 138 MMOL/L — SIGNIFICANT CHANGE UP (ref 135–145)
SP GR SPEC: 1.02 — SIGNIFICANT CHANGE UP (ref 1.01–1.02)
SPECIMEN SOURCE: SIGNIFICANT CHANGE UP
URATE CRY FLD QL MICRO: ABNORMAL
UROBILINOGEN FLD QL: SIGNIFICANT CHANGE UP
WBC UR QL: 14 /HPF — HIGH (ref 0–5)

## 2022-11-09 LAB
BASOPHILS # BLD AUTO: 0.07 K/UL — SIGNIFICANT CHANGE UP (ref 0–0.2)
BASOPHILS NFR BLD AUTO: 0.8 % — SIGNIFICANT CHANGE UP (ref 0–2)
EOSINOPHIL # BLD AUTO: 0.21 K/UL — SIGNIFICANT CHANGE UP (ref 0–0.5)
EOSINOPHIL NFR BLD AUTO: 2.4 % — SIGNIFICANT CHANGE UP (ref 0–6)
HCT VFR BLD CALC: 40.1 % — SIGNIFICANT CHANGE UP (ref 39–50)
HGB BLD-MCNC: 12.5 G/DL — LOW (ref 13–17)
IMM GRANULOCYTES NFR BLD AUTO: 0.3 % — SIGNIFICANT CHANGE UP (ref 0–0.9)
LYMPHOCYTES # BLD AUTO: 3.06 K/UL — SIGNIFICANT CHANGE UP (ref 1–3.3)
LYMPHOCYTES # BLD AUTO: 35.1 % — SIGNIFICANT CHANGE UP (ref 13–44)
MCHC RBC-ENTMCNC: 27.5 PG — SIGNIFICANT CHANGE UP (ref 27–34)
MCHC RBC-ENTMCNC: 31.2 GM/DL — LOW (ref 32–36)
MCV RBC AUTO: 88.3 FL — SIGNIFICANT CHANGE UP (ref 80–100)
MONOCYTES # BLD AUTO: 0.56 K/UL — SIGNIFICANT CHANGE UP (ref 0–0.9)
MONOCYTES NFR BLD AUTO: 6.4 % — SIGNIFICANT CHANGE UP (ref 2–14)
NEUTROPHILS # BLD AUTO: 4.78 K/UL — SIGNIFICANT CHANGE UP (ref 1.8–7.4)
NEUTROPHILS NFR BLD AUTO: 55 % — SIGNIFICANT CHANGE UP (ref 43–77)
PLATELET # BLD AUTO: 236 K/UL — SIGNIFICANT CHANGE UP (ref 150–400)
RBC # BLD: 4.54 M/UL — SIGNIFICANT CHANGE UP (ref 4.2–5.8)
RBC # FLD: 14.5 % — SIGNIFICANT CHANGE UP (ref 10.3–14.5)
WBC # BLD: 8.71 K/UL — SIGNIFICANT CHANGE UP (ref 3.8–10.5)
WBC # FLD AUTO: 8.71 K/UL — SIGNIFICANT CHANGE UP (ref 3.8–10.5)

## 2022-11-13 LAB — SARS-COV-2 N GENE NPH QL NAA+PROBE: NOT DETECTED

## 2022-11-14 ENCOUNTER — TRANSCRIPTION ENCOUNTER (OUTPATIENT)
Age: 32
End: 2022-11-14

## 2022-11-15 ENCOUNTER — APPOINTMENT (OUTPATIENT)
Dept: UROLOGY | Facility: HOSPITAL | Age: 32
End: 2022-11-15

## 2022-11-15 ENCOUNTER — RESULT REVIEW (OUTPATIENT)
Age: 32
End: 2022-11-15

## 2022-11-15 ENCOUNTER — TRANSCRIPTION ENCOUNTER (OUTPATIENT)
Age: 32
End: 2022-11-15

## 2022-11-15 ENCOUNTER — OUTPATIENT (OUTPATIENT)
Dept: INPATIENT UNIT | Facility: HOSPITAL | Age: 32
LOS: 1 days | End: 2022-11-15
Payer: COMMERCIAL

## 2022-11-15 VITALS
SYSTOLIC BLOOD PRESSURE: 142 MMHG | HEART RATE: 83 BPM | RESPIRATION RATE: 16 BRPM | DIASTOLIC BLOOD PRESSURE: 87 MMHG | OXYGEN SATURATION: 99 % | TEMPERATURE: 98 F

## 2022-11-15 VITALS
TEMPERATURE: 98 F | HEART RATE: 96 BPM | SYSTOLIC BLOOD PRESSURE: 142 MMHG | RESPIRATION RATE: 15 BRPM | HEIGHT: 68 IN | WEIGHT: 315 LBS | DIASTOLIC BLOOD PRESSURE: 86 MMHG | OXYGEN SATURATION: 99 %

## 2022-11-15 DIAGNOSIS — Z90.49 ACQUIRED ABSENCE OF OTHER SPECIFIED PARTS OF DIGESTIVE TRACT: Chronic | ICD-10-CM

## 2022-11-15 DIAGNOSIS — N20.0 CALCULUS OF KIDNEY: ICD-10-CM

## 2022-11-15 DIAGNOSIS — Z87.81 PERSONAL HISTORY OF (HEALED) TRAUMATIC FRACTURE: Chronic | ICD-10-CM

## 2022-11-15 DIAGNOSIS — Z96.0 PRESENCE OF UROGENITAL IMPLANTS: Chronic | ICD-10-CM

## 2022-11-15 LAB — GLUCOSE BLDC GLUCOMTR-MCNC: 136 MG/DL — HIGH (ref 70–99)

## 2022-11-15 PROCEDURE — 88300 SURGICAL PATH GROSS: CPT | Mod: 26

## 2022-11-15 PROCEDURE — 74420 UROGRAPHY RTRGR +-KUB: CPT | Mod: 26,RT

## 2022-11-15 PROCEDURE — 52356 CYSTO/URETERO W/LITHOTRIPSY: CPT | Mod: RT,59

## 2022-11-15 DEVICE — URETERAL CATH DUAL LUMEN 10FR 54CM: Type: IMPLANTABLE DEVICE | Status: FUNCTIONAL

## 2022-11-15 DEVICE — GUIDEWIRE SENSOR DUAL-FLEX NITINOL STRAIGHT .035" X 150CM: Type: IMPLANTABLE DEVICE | Status: FUNCTIONAL

## 2022-11-15 DEVICE — URETERAL SHEATH NAVIGATOR HD 12/14FR X 46CM: Type: IMPLANTABLE DEVICE | Status: FUNCTIONAL

## 2022-11-15 DEVICE — LASER FIBER FLEXIVA TRACTIP 200: Type: IMPLANTABLE DEVICE | Status: FUNCTIONAL

## 2022-11-15 DEVICE — STONE BASKET ZEROTIP NITINOL 4-WIRE 1.9FR 120CM X 12MM: Type: IMPLANTABLE DEVICE | Status: FUNCTIONAL

## 2022-11-15 DEVICE — URETERAL CATH OPEN END FLEXI-TIP 5FR .038" X 70CM: Type: IMPLANTABLE DEVICE | Status: FUNCTIONAL

## 2022-11-15 DEVICE — URETERAL STENT PERCUFLEX PLUS 7FR 26CM: Type: IMPLANTABLE DEVICE | Status: FUNCTIONAL

## 2022-11-15 RX ORDER — CEFAZOLIN SODIUM 1 G
3000 VIAL (EA) INJECTION ONCE
Refills: 0 | Status: DISCONTINUED | OUTPATIENT
Start: 2022-11-15 | End: 2022-11-15

## 2022-11-15 RX ORDER — ACETAMINOPHEN 500 MG
975 TABLET ORAL ONCE
Refills: 0 | Status: COMPLETED | OUTPATIENT
Start: 2022-11-15 | End: 2022-11-15

## 2022-11-15 RX ORDER — ONDANSETRON 8 MG/1
4 TABLET, FILM COATED ORAL ONCE
Refills: 0 | Status: DISCONTINUED | OUTPATIENT
Start: 2022-11-15 | End: 2022-11-15

## 2022-11-15 RX ORDER — HYDROMORPHONE HYDROCHLORIDE 2 MG/ML
0.5 INJECTION INTRAMUSCULAR; INTRAVENOUS; SUBCUTANEOUS
Refills: 0 | Status: DISCONTINUED | OUTPATIENT
Start: 2022-11-15 | End: 2022-11-15

## 2022-11-15 RX ORDER — CEFAZOLIN SODIUM 1 G
2000 VIAL (EA) INJECTION ONCE
Refills: 0 | Status: DISCONTINUED | OUTPATIENT
Start: 2022-11-15 | End: 2022-11-15

## 2022-11-15 RX ORDER — TAMSULOSIN HYDROCHLORIDE 0.4 MG/1
1 CAPSULE ORAL
Qty: 30 | Refills: 0
Start: 2022-11-15 | End: 2022-12-14

## 2022-11-15 RX ORDER — CEPHALEXIN 500 MG
1 CAPSULE ORAL
Qty: 9 | Refills: 0
Start: 2022-11-15 | End: 2022-11-17

## 2022-11-15 RX ORDER — POTASSIUM CITRATE MONOHYDRATE 100 %
1 POWDER (GRAM) MISCELLANEOUS
Qty: 120 | Refills: 0
Start: 2022-11-15 | End: 2022-12-14

## 2022-11-15 RX ORDER — FENTANYL CITRATE 50 UG/ML
25 INJECTION INTRAVENOUS
Refills: 0 | Status: DISCONTINUED | OUTPATIENT
Start: 2022-11-15 | End: 2022-11-15

## 2022-11-15 RX ADMIN — Medication 975 MILLIGRAM(S): at 12:34

## 2022-11-15 NOTE — ASU PREOP CHECKLIST - PATIENT'S PERSONAL PROPERTY GIVEN TO
on unit Partial Purse String (Intermediate) Text: Given the location of the defect and the characteristics of the surrounding skin an intermediate purse string closure was deemed most appropriate.  Undermining was performed circumfirentially around the surgical defect.  A purse string suture was then placed and tightened. Wound tension only allowed a partial closure of the circular defect.

## 2022-11-15 NOTE — ASU DISCHARGE PLAN (ADULT/PEDIATRIC) - CARE PROVIDER_API CALL
UROLOGY FOLLOW UP    Chief Complaint:  Staghorn left-sided kidney stone    History of Present Illness:  Harmeet Jacobs is a 55 year old female who presents today for follow up for the above noted concern.  No recent fevers.  She has had urinary frequency and is being treated for a UTI.  She needed a PICC line while in the hospital for IV access.  She recently completed a course of Cipro for this.  She does have stent related urinary frequency.  No additional new concerns.    I have reviewed the patient's medications and allergies, past medical, surgical, social and family history, updating these as appropriate.  See Histories section of the EMR for a display of this information.    Past Medical History:   Diagnosis Date   • Allergy    • Arthritis    • Bronchitis    • Depression    • Essential (primary) hypertension    • Hearing loss    • Macromastia     bilateral   • Mild mental retardation    • Nonorganic enuresis    • Obesity     morbid   • NELIDA on CPAP    • RAD (reactive airway disease)    • Type 2 diabetes mellitus (CMS/[facility])    • Urinary incontinence     wears briefs with a liner   • Urinary tract infection    • Uses wheelchair     wheelchair as needed or a 4 wheel walker sith a seat   • Wears prescription eyeglasses      Past Surgical History:   Procedure Laterality Date   • Anes pelvic exam under anes     •  section, low transverse     • Cholecystectomy     • D and c  2016   • Hysterectomy     • Kidney stone surgery     • Robotic assisted hysterectomy  2022    RA TLH, BSO, B/L pelvic SLNB - Dr. Mancini     Current Outpatient Medications   Medication Sig   • ferrous sulfate 325 (65 FE) MG tablet Take 1 tablet by mouth daily.   • tiZANidine (ZANAFLEX) 4 MG tablet Take 1 tablet by mouth every 8 hours as needed (pain).   • Blood Pressure Monitoring (Comfort Touch BP Cuff/Large) Misc 1 each daily.   • acetaminophen (TYLENOL) 500 MG tablet Take 1,000 mg by mouth every 6 hours as needed for  David Payton)  Urology  [address], [place] [address]  Phone: [phone]  Fax: [phone]  Follow Up Time:    Pain.    • simethicone (SM Gas Relief) 125 MG chewable tablet Chew 1 tablet by mouth every 6 hours as needed (flatulence). (Patient taking differently: Chew 250 mg by mouth daily.)   • glipiZIDE (GLUCOTROL) 5 MG 24 hr tablet Take 1 tablet by mouth daily.   • ondansetron (Zofran ODT) 4 MG disintegrating tablet Place 1 tablet onto the tongue every 8 hours as needed for Nausea.   • blood glucose test strip Test blood sugar daily at alternating times, fasting and 2 hours post meal Diagnosis: E11.9. Meter: Contour next   • Lancets (MICROLET) Misc Test blood sugar daily at alternating times, fasting and 2 hours post meal.   • losartan (COZAAR) 50 MG tablet Take 1 tablet by mouth daily.   • Cholecalciferol 50 mcg (2,000 units) tablet Take 1 tablet by mouth daily.   • omeprazole (PrilOSEC) 20 MG capsule Take 1 capsule by mouth 2 times daily.   • albuterol 108 (90 Base) MCG/ACT inhaler Inhale 2 puffs into the lungs every 4 hours as needed for Shortness of Breath or Wheezing.   • oxybutynin (DITROPAN XL) 15 MG 24 hr tablet Take 1 tablet by mouth daily.   • nystatin (MYCOSTATIN) 788813 UNIT/GM powder Apply topically every 12 hours.   • blood glucose meter Test blood sugar one time daily as directed. Diagnosis: hyperglycemia- R73.9. Meter: whatever is covered by insurance     No current facility-administered medications for this visit.       Physical Examination:  GENERAL: Well-developed, well-nourished with appropriate appearance for age in no acute distress.  Visit Vitals  /74 (BP Location: LLE - Left lower extremity, Patient Position: Sitting, Cuff Size: Regular)   Pulse 78   Temp 98.3 °F (36.8 °C) (Temporal)   Wt 122.1 kg (269 lb 2.9 oz)   LMP 10/05/2016 (Approximate) Comment: 4/2022   SpO2 99%   BMI 46.20 kg/m²   Abdomen:  Soft, nontender, nondistended.  Nephrostomy tube incision sites have healed nicely.    Pertinent Imaging and Labs:  Lab Results   Component Value Date    SODIUM 139 06/23/2022    POTASSIUM 3.5  06/23/2022    CHLORIDE 103 06/23/2022    CO2 27 06/23/2022    BUN 19 06/23/2022    CREATININE 0.94 06/23/2022    GLUCOSE 107 (H) 06/23/2022       Lab Results   Component Value Date    COL Yellow 06/08/2022    UAPP Hazy 06/08/2022    USPG 1.020 06/08/2022    UPH 6.5 06/08/2022    UPROT 30  (A) 06/08/2022    UGLU Negative 06/08/2022    UKET Negative 06/08/2022    UBILI Negative 06/08/2022    URBC Small (A) 06/08/2022    UNITR Positive (A) 06/08/2022    UROB 0.2 06/08/2022    UWBC Large (A) 06/08/2022       Lab Results   Component Value Date    WBC 7.0 06/23/2022    HCT 37.3 06/23/2022    HGB 10.7 (L) 06/23/2022     (H) 06/23/2022    SODIUM 139 06/23/2022    POTASSIUM 3.5 06/23/2022    CO2 27 06/23/2022    CHLORIDE 103 06/23/2022    BUN 19 06/23/2022    CREATININE 0.94 06/23/2022    GLUCOSE 107 (H) 06/23/2022    INR 1.0 05/18/2022     KUB:  I reviewed the images of her KUB from this week with her family member.  Although she does have obvious stone burden remaining in the left kidney, it appears that approximately 2/3 of the stone burden has been successfully cleared.  Ureteral stent is in place.  There is substantial length of the stent in the bladder which may be contributing to her irritative voiding symptoms.    Assessment:  Harmeet Jacobs is a 55 year old female with clearance of a significant portion of her staghorn renal calculus stone burden with her 1st stage percutaneous nephrolithotomy and antegrade stent placement.  I did reiterate the discussion with her daughter that she will require a 2nd staged percutaneous nephrolithotomy as well as likely ureteroscopy in the future.  I recommend that she proceed with her 2nd staged percutaneous nephrolithotomy in the near future as her hemoglobin has almost fully normalized.  We have previously discussed the risks and benefits of percutaneous nephrolithotomy in detail.    Plan:  Left-sided percutaneous nephrolithotomy in early to mid July depending upon  Interventional Radiology scheduling availability.  She will require preop repeat urine culture and will require oral antibiotic coverage prior to her surgical intervention.  This coverage can be started following her preoperative urine culture and if she is not symptomatic, this can start 5 days preoperatively.  She agrees with the plan for percutaneous nephrolithotomy and did verbalize understanding of the fact that she will likely require ureteroscopy subsequent to this as well.    Thanks for allowing me to participate in Harmeet Jacobs's care.     Fidel Lim MD

## 2022-11-15 NOTE — ASU DISCHARGE PLAN (ADULT/PEDIATRIC) - ASU DC SPECIAL INSTRUCTIONSFT
Dr. Cheema' office will contact you next few days to make arrangements for Cat Scan, and follow up in office-   For pain may use over the counter medication, ibuprofen, advil, motrin , etc.

## 2022-11-15 NOTE — BRIEF OPERATIVE NOTE - OPERATION/FINDINGS
encrusted stent  ureteral stone found - laser  multiple very large volume stones in multiple calyces in the kidney  laser lithotripsy - at the end multiple fragments and dust like debris, and cannot be certain everything is treated

## 2022-11-15 NOTE — ASU DISCHARGE PLAN (ADULT/PEDIATRIC) - NS MD DC FALL RISK RISK
For information on Fall & Injury Prevention, visit: [email or web address] OR  [email or web address] OR  https://www.cdc.gov/steadi/patient.html

## 2022-11-16 PROBLEM — J30.2 OTHER SEASONAL ALLERGIC RHINITIS: Chronic | Status: ACTIVE | Noted: 2022-11-07

## 2022-11-16 PROBLEM — E66.01 MORBID (SEVERE) OBESITY DUE TO EXCESS CALORIES: Chronic | Status: ACTIVE | Noted: 2022-11-07

## 2022-11-16 PROBLEM — Z92.29 PERSONAL HISTORY OF OTHER DRUG THERAPY: Chronic | Status: ACTIVE | Noted: 2022-11-07

## 2022-11-16 PROBLEM — N20.0 CALCULUS OF KIDNEY: Chronic | Status: ACTIVE | Noted: 2022-11-07

## 2022-11-16 PROCEDURE — C2617: CPT

## 2022-11-16 PROCEDURE — 82962 GLUCOSE BLOOD TEST: CPT

## 2022-11-16 PROCEDURE — C1758: CPT

## 2022-11-16 PROCEDURE — 82365 CALCULUS SPECTROSCOPY: CPT

## 2022-11-16 PROCEDURE — 88300 SURGICAL PATH GROSS: CPT

## 2022-11-16 PROCEDURE — C1769: CPT

## 2022-11-16 PROCEDURE — C1889: CPT

## 2022-11-16 PROCEDURE — 52356 CYSTO/URETERO W/LITHOTRIPSY: CPT | Mod: RT

## 2022-11-22 LAB — NIDUS STONE QN: SIGNIFICANT CHANGE UP

## 2022-11-25 ENCOUNTER — APPOINTMENT (OUTPATIENT)
Dept: CT IMAGING | Facility: CLINIC | Age: 32
End: 2022-11-25

## 2022-11-25 LAB — SURGICAL PATHOLOGY STUDY: SIGNIFICANT CHANGE UP

## 2022-12-07 ENCOUNTER — APPOINTMENT (OUTPATIENT)
Dept: UROLOGY | Facility: CLINIC | Age: 32
End: 2022-12-07

## 2022-12-20 ENCOUNTER — NON-APPOINTMENT (OUTPATIENT)
Age: 32
End: 2022-12-20

## 2023-01-18 RX ORDER — CEPHALEXIN 500 MG/1
500 CAPSULE ORAL
Qty: 10 | Refills: 0 | Status: DISCONTINUED | COMMUNITY
Start: 2022-08-26 | End: 2023-01-18

## 2023-01-20 ENCOUNTER — APPOINTMENT (OUTPATIENT)
Dept: CT IMAGING | Facility: CLINIC | Age: 33
End: 2023-01-20
Payer: COMMERCIAL

## 2023-01-20 ENCOUNTER — OUTPATIENT (OUTPATIENT)
Dept: OUTPATIENT SERVICES | Facility: HOSPITAL | Age: 33
LOS: 1 days | End: 2023-01-20
Payer: COMMERCIAL

## 2023-01-20 ENCOUNTER — RESULT REVIEW (OUTPATIENT)
Age: 33
End: 2023-01-20

## 2023-01-20 DIAGNOSIS — Z96.0 PRESENCE OF UROGENITAL IMPLANTS: Chronic | ICD-10-CM

## 2023-01-20 DIAGNOSIS — Z87.81 PERSONAL HISTORY OF (HEALED) TRAUMATIC FRACTURE: Chronic | ICD-10-CM

## 2023-01-20 DIAGNOSIS — Z90.49 ACQUIRED ABSENCE OF OTHER SPECIFIED PARTS OF DIGESTIVE TRACT: Chronic | ICD-10-CM

## 2023-01-20 DIAGNOSIS — N20.0 CALCULUS OF KIDNEY: ICD-10-CM

## 2023-01-20 PROCEDURE — 74176 CT ABD & PELVIS W/O CONTRAST: CPT | Mod: 26

## 2023-01-20 PROCEDURE — 74176 CT ABD & PELVIS W/O CONTRAST: CPT

## 2023-01-25 ENCOUNTER — APPOINTMENT (OUTPATIENT)
Dept: UROLOGY | Facility: CLINIC | Age: 33
End: 2023-01-25
Payer: COMMERCIAL

## 2023-01-25 PROCEDURE — 99215 OFFICE O/P EST HI 40 MIN: CPT

## 2023-01-26 NOTE — ASSESSMENT
[FreeTextEntry1] : \par plan:\par - allopurinol\par - continue medication\par - fluid\par - cbc, BMP\par - will update regarding surgery\par - seeing his PCPC next week\par - will schedule TTM on Wednesday to discuss

## 2023-01-26 NOTE — HISTORY OF PRESENT ILLNESS
[FreeTextEntry1] : 31 yo M for follow up\par see previous notes\par last surgery had a ureteroscopy with a stone volume significantly larger than expected based on the previous CT\par stent was left in the system\par now with CT to decide if he needs more treatment\par all stones are 100% UA\par \par patient already on potassium citrate 15, 4 times a day\par new CT: stones in the lower calyx ~ 14 mm, another 5 mm stone adjacent., stent in place, no encrusted\par HU ~ 300\par SSD > 20 cm\par \par discussed the findings with the patient\par discussed the location iof the stone and the angles to treat the large cluster of the stones\par discussed either ureteroscopy again or PCNL\par ureteroscopy is the less invasive procedure, but there is a chance of not being able to reach the stone in the lower calyx. PCNL would be the more invasive procedure, but with a higher stone free rate\par also discussed an option of planning a combined surgery of starting with URS, and if needed continue to PCNL\par \par discussed urs\par Risks, benefits and alternatives discussed. Procedure, in hospital stay and recovery explained. Risk of infection, multiple procedures, ureteral injury, ureteral stricture, incomplete stone clearance, sepsis, bleeding, and retention, all discussed.\par  \par discussed PCNL\par Procedure, in hospital stay and recovery discussed with the patient. Potential complications of bleeding, transfusion, selective angioembolization if indicated, adjacent organ injury, fever, sepsis, infection, incomplete stone clearance, bowel or other unusual injury. chest complications, vascular injuries, procedures needed to address any complications, ureteral injury, anesthetic complications, all discussed.\par \par Printed information including all of the above and more provided to the patient. \par \par \par \par plan:\par - allopurinol\par - continue medication\par - fluid\par - cbc, BMP\par - will update regarding surgery\par - seeing his PCP next week\par - will schedule TTM on Wednesday to discuss

## 2023-01-30 LAB
HBA1C MFR BLD HPLC: 8.5
LDLC SERPL DIRECT ASSAY-MCNC: 71
MICROALBUMIN/CREAT 24H UR-RTO: 144

## 2023-01-31 ENCOUNTER — APPOINTMENT (OUTPATIENT)
Dept: ENDOCRINOLOGY | Facility: CLINIC | Age: 33
End: 2023-01-31

## 2023-02-01 ENCOUNTER — APPOINTMENT (OUTPATIENT)
Dept: UROLOGY | Facility: CLINIC | Age: 33
End: 2023-02-01
Payer: COMMERCIAL

## 2023-02-01 PROCEDURE — 99442: CPT

## 2023-02-01 NOTE — ASSESSMENT
[FreeTextEntry1] : \par \par plan:\par - CBC and BMP, ill update over the phone\par - right PCNL, standard due to length of tract needed\par

## 2023-02-01 NOTE — HISTORY OF PRESENT ILLNESS
[Home] : at home, [unfilled] , at the time of the visit. [Medical Office: ([facility])___] : at the medical office located in  [Verbal consent obtained from patient] : the patient, [unfilled] [FreeTextEntry1] : 31 yo M with nephrolithiasis\par see last note with full details\par did not have the chance to see his PCP yet\par feeling well\par will send a script for CBC and BMP and he will do it\par discussed procedure - he is more interested in PCNL\par \par \par plan:\par - CBC and BMP, ill update over the phone\par - right PCNL, standard due to length of tract needed\par

## 2023-02-17 ENCOUNTER — NON-APPOINTMENT (OUTPATIENT)
Age: 33
End: 2023-02-17

## 2023-03-10 ENCOUNTER — OUTPATIENT (OUTPATIENT)
Dept: OUTPATIENT SERVICES | Facility: HOSPITAL | Age: 33
LOS: 1 days | End: 2023-03-10
Payer: COMMERCIAL

## 2023-03-10 VITALS
RESPIRATION RATE: 18 BRPM | TEMPERATURE: 98 F | DIASTOLIC BLOOD PRESSURE: 80 MMHG | SYSTOLIC BLOOD PRESSURE: 120 MMHG | OXYGEN SATURATION: 98 % | HEIGHT: 67 IN | WEIGHT: 315 LBS | HEART RATE: 94 BPM

## 2023-03-10 DIAGNOSIS — Z29.9 ENCOUNTER FOR PROPHYLACTIC MEASURES, UNSPECIFIED: ICD-10-CM

## 2023-03-10 DIAGNOSIS — N20.0 CALCULUS OF KIDNEY: ICD-10-CM

## 2023-03-10 DIAGNOSIS — Z01.818 ENCOUNTER FOR OTHER PREPROCEDURAL EXAMINATION: ICD-10-CM

## 2023-03-10 DIAGNOSIS — E11.9 TYPE 2 DIABETES MELLITUS WITHOUT COMPLICATIONS: ICD-10-CM

## 2023-03-10 DIAGNOSIS — Z87.81 PERSONAL HISTORY OF (HEALED) TRAUMATIC FRACTURE: Chronic | ICD-10-CM

## 2023-03-10 DIAGNOSIS — Z96.0 PRESENCE OF UROGENITAL IMPLANTS: Chronic | ICD-10-CM

## 2023-03-10 DIAGNOSIS — Z90.49 ACQUIRED ABSENCE OF OTHER SPECIFIED PARTS OF DIGESTIVE TRACT: Chronic | ICD-10-CM

## 2023-03-10 LAB
APPEARANCE UR: ABNORMAL
BACTERIA # UR AUTO: ABNORMAL
BILIRUB UR-MCNC: NEGATIVE — SIGNIFICANT CHANGE UP
COLOR SPEC: YELLOW — SIGNIFICANT CHANGE UP
DIFF PNL FLD: ABNORMAL
EPI CELLS # UR: SIGNIFICANT CHANGE UP
GLUCOSE UR QL: 50 MG/DL
KETONES UR-MCNC: ABNORMAL
LEUKOCYTE ESTERASE UR-ACNC: ABNORMAL
NITRITE UR-MCNC: NEGATIVE — SIGNIFICANT CHANGE UP
PH UR: 5 — SIGNIFICANT CHANGE UP (ref 5–8)
PROT UR-MCNC: 15
RBC CASTS # UR COMP ASSIST: ABNORMAL /HPF (ref 0–4)
SP GR SPEC: 1.01 — SIGNIFICANT CHANGE UP (ref 1.01–1.02)
UROBILINOGEN FLD QL: NEGATIVE MG/DL — SIGNIFICANT CHANGE UP
WBC UR QL: ABNORMAL /HPF (ref 0–5)

## 2023-03-10 PROCEDURE — G0463: CPT

## 2023-03-10 PROCEDURE — 93005 ELECTROCARDIOGRAM TRACING: CPT

## 2023-03-10 PROCEDURE — 93010 ELECTROCARDIOGRAM REPORT: CPT

## 2023-03-10 NOTE — H&P PST ADULT - LAST ECHOCARDIOGRAM
2021 In [place] (went due to father having cardic hx) 2021 In [place] (went due to father having cardiac hx)

## 2023-03-10 NOTE — H&P PST ADULT - ASSESSMENT
31 y/o male with PMH of kidney stones, DM2, asthma, obese, sleep apnea arrives from home with chief complaint of kidney stones. Upon assessment, no CVA tenderness, surgical site clear and intact. Pt instructed to stop vitamins/supplements/herbal medications/ASA/NSAIDS for one week prior to surgery and discuss with PMD. Patient educated on surgical scrub, COVID testing, preadmission instructions, medical clearance and day of procedure medications, verbalizes understanding.    OPIOID RISK TOOL    PATRICIA EACH BOX THAT APPLIES AND ADD TOTALS AT THE END    FAMILY HISTORY OF SUBSTANCE ABUSE                 FEMALE         MALE                                                Alcohol                             [  ]1 pt          [  ]3pts                                               Illegal Durgs                     [  ]2 pts        [  ]3pts                                               Rx Drugs                           [  ]4 pts        [  ]4 pts    PERSONAL HISTORY OF SUBSTANCE ABUSE                                                                                          Alcohol                             [  ]3 pts       [  ]3 pts                                               Illegal Durgs                     [  ]4 pts        [  ]4 pts                                               Rx Drugs                           [  ]5 pts        [  ]5 pts    AGE BETWEEN 16-45 YEARS                                      [  ]1 pt         [  x]1 pt    HISTORY OF PREADOLESCENT   SEXUAL ABUSE                                                             [  ]3 pts        [  ]0pts    PSYCHOLOGICAL DISEASE                     ADD, OCD, Bipolar, Schizophrenia        [  ]2 pts         [  ]2 pts                      Depression                                               [  ]1 pt           [  ]1 pt           SCORING TOTAL   (add numbers and type here)              (*1**)                                     A score of 3 or lower indicated LOW risk for future opiod abuse  A score of 4 to 7 indicated moderate risk for future opiod abuse  A score of 8 or higher indicates a high risk for opiod abuse    CAPRINI SCORE    AGE RELATED RISK FACTORS                                                             [ ] Age 41-60 years                                            (1 Point)  [ ] Age: 61-74 years                                           (2 Points)                 [ ] Age= 75 years                                                (3 Points)             DISEASE RELATED RISK FACTORS                                                       [ ] Edema in the lower extremities                 (1 Point)                     [ ] Varicose veins                                               (1 Point)                                 [x ] BMI > 25 Kg/m2                                            (1 Point)                                  [ ] Serious infection (ie PNA)                            (1 Point)                     [ ] Lung disease ( COPD, Emphysema)            (1 Point)                                                                          [ ] Acute myocardial infarction                         (1 Point)                  [ ] Congestive heart failure (in the previous month)  (1 Point)         [ ] Inflammatory bowel disease                            (1 Point)                  [ ] Central venous access, PICC or Port               (2 points)       (within the last month)                                                                [ ] Stroke (in the previous month)                        (5 Points)    [ ] Previous or present malignancy                       (2 points)                                                                                                                                                         HEMATOLOGY RELATED FACTORS                                                         [ ] Prior episodes of VTE                                     (3 Points)                     [ ] Positive family history for VTE                      (3 Points)                  [ ] Prothrombin 35110 A                                     (3 Points)                     [ ] Factor V Leiden                                                (3 Points)                        [ ] Lupus anticoagulants                                      (3 Points)                                                           [ ] Anticardiolipin antibodies                              (3 Points)                                                       [ ] High homocysteine in the blood                   (3 Points)                                             [ ] Other congenital or acquired thrombophilia      (3 Points)                                                [ ] Heparin induced thrombocytopenia                  (3 Points)                                        MOBILITY RELATED FACTORS  [ ] Bed rest                                                         (1 Point)  [ ] Plaster cast                                                    (2 points)  [ ] Bed bound for more than 72 hours           (2 Points)    GENDER SPECIFIC FACTORS  [ ] Pregnancy or had a baby within the last month   (1 Point)  [ ] Post-partum < 6 weeks                                   (1 Point)  [ ] Hormonal therapy  or oral contraception   (1 Point)  [ ] History of pregnancy complications              (1 point)  [ ] Unexplained or recurrent              (1 Point)    OTHER RISK FACTORS                                           (1 Point)  [ x] BMI >40, smoking, diabetes requiring insulin, chemotherapy  blood transfusions and length of surgery over 2 hours    SURGERY RELATED RISK FACTORS  [ ]  Section within the last month     (1 Point)  [ ] Minor surgery                                                  (1 Point)  [ ] Arthroscopic surgery                                       (2 Points)  [x ] Planned major surgery lasting more            (2 Points)      than 45 minutes     [ ] Elective hip or knee joint replacement       (5 points)       surgery                                                TRAUMA RELATED RISK FACTORS  [ ] Fracture of the hip, pelvis, or leg                       (5 Points)  [ ] Spinal cord injury resulting in paralysis             (5 points)       (in the previous month)    [ ] Paralysis  (less than 1 month)                             (5 Points)  [ ] Multiple Trauma within 1 month                        (5 Points)    Total Score [   4 ]    Caprini Score 0-2: Low Risk, NO VTE prophylaxis required for most patients, encourage ambulation  Caprini Score 3-6: Moderate Risk , pharmacologic VTE prophylaxis is indicated for most patients (in the absence of contraindications)  Caprini Score Greater than or =7: High risk, pharmocologic VTE prophylaxis indicated for most patients (in the absence of contraindications)

## 2023-03-10 NOTE — H&P PST ADULT - HISTORY OF PRESENT ILLNESS
since june 3rd procedure  stent changed 2 x blasted stones  stones have collected making it not able to filter out  denies pain  sometimes blood in urine no pain   31 y/o male with PMH of kidney stones, DM2, asthma, obese, sleep apnea arrives from home with chief complaint of kidney stones. Reports that he had right sided flank pain that began in June 2022. Since then he has had 2 previous procedures that placed a stent in the right ureter and "blasted the stones." He states the pieces of the stones have created a blockage that will not pass and this is why he is having this procedure. He states he has a 2/10 right flank pain. Denies any difficulty urinating but has occasional blood in his urine. He is scheduled for Right Percutaneous Nephrostolithotomy with MD Payton on 3/14/23.  Medical Clearance Pending

## 2023-03-10 NOTE — H&P PST ADULT - PROBLEM SELECTOR PLAN 1
He is scheduled for Right Percutaneous Nephrostolithotomy with MD Payton on 3/14/23.  Medical Clearance Pending

## 2023-03-10 NOTE — H&P PST ADULT - PROBLEM SELECTOR PLAN 3
Caprini Score 4: Moderate Risk , pharmacologic VTE prophylaxis is indicated for most patients (in the absence of contraindications)

## 2023-03-10 NOTE — H&P PST ADULT - NSICDXPASTMEDICALHX_GEN_ALL_CORE_FT
PAST MEDICAL HISTORY:  Asthma     Calculus of kidney     COVID-19 vaccine series completed     DM2 (diabetes mellitus, type 2)     Kidney stones     Obesity, morbid     Seasonal allergies     Sleep apnea, obstructive

## 2023-03-12 LAB
CULTURE RESULTS: SIGNIFICANT CHANGE UP
SPECIMEN SOURCE: SIGNIFICANT CHANGE UP

## 2023-03-14 ENCOUNTER — APPOINTMENT (OUTPATIENT)
Dept: UROLOGY | Facility: HOSPITAL | Age: 33
End: 2023-03-14

## 2023-03-14 ENCOUNTER — INPATIENT (INPATIENT)
Facility: HOSPITAL | Age: 33
LOS: 2 days | Discharge: HOME | DRG: 660 | End: 2023-03-17
Attending: STUDENT IN AN ORGANIZED HEALTH CARE EDUCATION/TRAINING PROGRAM | Admitting: STUDENT IN AN ORGANIZED HEALTH CARE EDUCATION/TRAINING PROGRAM
Payer: COMMERCIAL

## 2023-03-14 VITALS
TEMPERATURE: 97 F | HEIGHT: 67 IN | DIASTOLIC BLOOD PRESSURE: 91 MMHG | WEIGHT: 315 LBS | OXYGEN SATURATION: 97 % | HEART RATE: 100 BPM | RESPIRATION RATE: 20 BRPM | SYSTOLIC BLOOD PRESSURE: 148 MMHG

## 2023-03-14 DIAGNOSIS — Z96.0 PRESENCE OF UROGENITAL IMPLANTS: Chronic | ICD-10-CM

## 2023-03-14 DIAGNOSIS — Z90.49 ACQUIRED ABSENCE OF OTHER SPECIFIED PARTS OF DIGESTIVE TRACT: Chronic | ICD-10-CM

## 2023-03-14 DIAGNOSIS — Z87.81 PERSONAL HISTORY OF (HEALED) TRAUMATIC FRACTURE: Chronic | ICD-10-CM

## 2023-03-14 DIAGNOSIS — N20.0 CALCULUS OF KIDNEY: ICD-10-CM

## 2023-03-14 LAB
ANION GAP SERPL CALC-SCNC: 14 MMOL/L — SIGNIFICANT CHANGE UP (ref 5–17)
BUN SERPL-MCNC: 16.4 MG/DL — SIGNIFICANT CHANGE UP (ref 8–20)
CALCIUM SERPL-MCNC: 8.8 MG/DL — SIGNIFICANT CHANGE UP (ref 8.4–10.5)
CHLORIDE SERPL-SCNC: 100 MMOL/L — SIGNIFICANT CHANGE UP (ref 96–108)
CO2 SERPL-SCNC: 21 MMOL/L — LOW (ref 22–29)
CREAT SERPL-MCNC: 0.67 MG/DL — SIGNIFICANT CHANGE UP (ref 0.5–1.3)
EGFR: 126 ML/MIN/1.73M2 — SIGNIFICANT CHANGE UP
GLUCOSE BLDC GLUCOMTR-MCNC: 181 MG/DL — HIGH (ref 70–99)
GLUCOSE BLDC GLUCOMTR-MCNC: 245 MG/DL — HIGH (ref 70–99)
GLUCOSE BLDC GLUCOMTR-MCNC: 259 MG/DL — HIGH (ref 70–99)
GLUCOSE BLDC GLUCOMTR-MCNC: 275 MG/DL — HIGH (ref 70–99)
GLUCOSE BLDC GLUCOMTR-MCNC: 292 MG/DL — HIGH (ref 70–99)
GLUCOSE BLDC GLUCOMTR-MCNC: 311 MG/DL — HIGH (ref 70–99)
GLUCOSE SERPL-MCNC: 292 MG/DL — HIGH (ref 70–99)
HCT VFR BLD CALC: 37.6 % — LOW (ref 39–50)
HGB BLD-MCNC: 12.6 G/DL — LOW (ref 13–17)
MCHC RBC-ENTMCNC: 27.9 PG — SIGNIFICANT CHANGE UP (ref 27–34)
MCHC RBC-ENTMCNC: 33.5 GM/DL — SIGNIFICANT CHANGE UP (ref 32–36)
MCV RBC AUTO: 83.4 FL — SIGNIFICANT CHANGE UP (ref 80–100)
PLATELET # BLD AUTO: 207 K/UL — SIGNIFICANT CHANGE UP (ref 150–400)
POTASSIUM SERPL-MCNC: 4.7 MMOL/L — SIGNIFICANT CHANGE UP (ref 3.5–5.3)
POTASSIUM SERPL-SCNC: 4.7 MMOL/L — SIGNIFICANT CHANGE UP (ref 3.5–5.3)
RBC # BLD: 4.51 M/UL — SIGNIFICANT CHANGE UP (ref 4.2–5.8)
RBC # FLD: 13.9 % — SIGNIFICANT CHANGE UP (ref 10.3–14.5)
SARS-COV-2 RNA SPEC QL NAA+PROBE: SIGNIFICANT CHANGE UP
SODIUM SERPL-SCNC: 135 MMOL/L — SIGNIFICANT CHANGE UP (ref 135–145)
WBC # BLD: 8.5 K/UL — SIGNIFICANT CHANGE UP (ref 3.8–10.5)
WBC # FLD AUTO: 8.5 K/UL — SIGNIFICANT CHANGE UP (ref 3.8–10.5)

## 2023-03-14 PROCEDURE — 71045 X-RAY EXAM CHEST 1 VIEW: CPT | Mod: 26

## 2023-03-14 PROCEDURE — 50437 DILAT XST TRC NEW ACCESS RCS: CPT | Mod: RT,59

## 2023-03-14 PROCEDURE — 50081 PERQ NL/PL LITHOTRP CPLX>2CM: CPT | Mod: RT,22

## 2023-03-14 PROCEDURE — 52317 REMOVE BLADDER STONE: CPT | Mod: 59

## 2023-03-14 PROCEDURE — 52334 CREATE PASSAGE TO KIDNEY: CPT | Mod: RT,59

## 2023-03-14 DEVICE — IMPLANTABLE DEVICE: Type: IMPLANTABLE DEVICE | Site: RIGHT | Status: FUNCTIONAL

## 2023-03-14 DEVICE — NEPHROSTOMY BALLOON CATH NEPHROMAX 24FR 17CM PTFE: Type: IMPLANTABLE DEVICE | Site: RIGHT | Status: FUNCTIONAL

## 2023-03-14 DEVICE — STONE BASKET ZEROTIP NITINOL 4-WIRE 1.9FR 120CM X 12MM: Type: IMPLANTABLE DEVICE | Site: RIGHT | Status: FUNCTIONAL

## 2023-03-14 DEVICE — AMPLATZ RENAL DILATOR 6FR-30FR X 16CM: Type: IMPLANTABLE DEVICE | Site: RIGHT | Status: FUNCTIONAL

## 2023-03-14 DEVICE — URETERAL CATH IMAGER II BERN 5FR 65CM: Type: IMPLANTABLE DEVICE | Site: RIGHT | Status: FUNCTIONAL

## 2023-03-14 DEVICE — STENT URET PERCFLX 8X24 DBL J: Type: IMPLANTABLE DEVICE | Site: RIGHT | Status: FUNCTIONAL

## 2023-03-14 RX ORDER — SODIUM CHLORIDE 9 MG/ML
1000 INJECTION, SOLUTION INTRAVENOUS
Refills: 0 | Status: DISCONTINUED | OUTPATIENT
Start: 2023-03-14 | End: 2023-03-16

## 2023-03-14 RX ORDER — ACETAMINOPHEN 500 MG
975 TABLET ORAL ONCE
Refills: 0 | Status: COMPLETED | OUTPATIENT
Start: 2023-03-14 | End: 2023-03-14

## 2023-03-14 RX ORDER — CEFAZOLIN SODIUM 1 G
2000 VIAL (EA) INJECTION EVERY 8 HOURS
Refills: 0 | Status: COMPLETED | OUTPATIENT
Start: 2023-03-14 | End: 2023-03-14

## 2023-03-14 RX ORDER — DEXTROSE 50 % IN WATER 50 %
25 SYRINGE (ML) INTRAVENOUS ONCE
Refills: 0 | Status: DISCONTINUED | OUTPATIENT
Start: 2023-03-14 | End: 2023-03-16

## 2023-03-14 RX ORDER — HEPARIN SODIUM 5000 [USP'U]/ML
5000 INJECTION INTRAVENOUS; SUBCUTANEOUS EVERY 8 HOURS
Refills: 0 | Status: DISCONTINUED | OUTPATIENT
Start: 2023-03-15 | End: 2023-03-16

## 2023-03-14 RX ORDER — INSULIN LISPRO 100/ML
10 VIAL (ML) SUBCUTANEOUS ONCE
Refills: 0 | Status: COMPLETED | OUTPATIENT
Start: 2023-03-14 | End: 2023-03-14

## 2023-03-14 RX ORDER — ALLOPURINOL 300 MG
100 TABLET ORAL DAILY
Refills: 0 | Status: DISCONTINUED | OUTPATIENT
Start: 2023-03-14 | End: 2023-03-16

## 2023-03-14 RX ORDER — INSULIN LISPRO 100/ML
6 VIAL (ML) SUBCUTANEOUS ONCE
Refills: 0 | Status: COMPLETED | OUTPATIENT
Start: 2023-03-14 | End: 2023-03-14

## 2023-03-14 RX ORDER — CEFAZOLIN SODIUM 1 G
3000 VIAL (EA) INJECTION ONCE
Refills: 0 | Status: COMPLETED | OUTPATIENT
Start: 2023-03-14 | End: 2023-03-14

## 2023-03-14 RX ORDER — FENTANYL CITRATE 50 UG/ML
25 INJECTION INTRAVENOUS
Refills: 0 | Status: DISCONTINUED | OUTPATIENT
Start: 2023-03-14 | End: 2023-03-14

## 2023-03-14 RX ORDER — SODIUM CHLORIDE 9 MG/ML
1000 INJECTION, SOLUTION INTRAVENOUS
Refills: 0 | Status: DISCONTINUED | OUTPATIENT
Start: 2023-03-14 | End: 2023-03-14

## 2023-03-14 RX ORDER — OXYCODONE HYDROCHLORIDE 5 MG/1
10 TABLET ORAL ONCE
Refills: 0 | Status: DISCONTINUED | OUTPATIENT
Start: 2023-03-14 | End: 2023-03-14

## 2023-03-14 RX ORDER — ONDANSETRON 8 MG/1
4 TABLET, FILM COATED ORAL ONCE
Refills: 0 | Status: COMPLETED | OUTPATIENT
Start: 2023-03-14 | End: 2023-03-14

## 2023-03-14 RX ORDER — CEFAZOLIN SODIUM 1 G
2000 VIAL (EA) INJECTION EVERY 8 HOURS
Refills: 0 | Status: DISCONTINUED | OUTPATIENT
Start: 2023-03-14 | End: 2023-03-14

## 2023-03-14 RX ORDER — GLUCAGON INJECTION, SOLUTION 0.5 MG/.1ML
1 INJECTION, SOLUTION SUBCUTANEOUS ONCE
Refills: 0 | Status: DISCONTINUED | OUTPATIENT
Start: 2023-03-14 | End: 2023-03-16

## 2023-03-14 RX ORDER — LORATADINE 10 MG/1
10 TABLET ORAL DAILY
Refills: 0 | Status: DISCONTINUED | OUTPATIENT
Start: 2023-03-14 | End: 2023-03-16

## 2023-03-14 RX ORDER — HYDROMORPHONE HYDROCHLORIDE 2 MG/ML
0.5 INJECTION INTRAMUSCULAR; INTRAVENOUS; SUBCUTANEOUS
Refills: 0 | Status: DISCONTINUED | OUTPATIENT
Start: 2023-03-14 | End: 2023-03-14

## 2023-03-14 RX ORDER — OXYCODONE HYDROCHLORIDE 5 MG/1
10 TABLET ORAL EVERY 6 HOURS
Refills: 0 | Status: DISCONTINUED | OUTPATIENT
Start: 2023-03-14 | End: 2023-03-16

## 2023-03-14 RX ORDER — OXYCODONE HYDROCHLORIDE 5 MG/1
5 TABLET ORAL EVERY 6 HOURS
Refills: 0 | Status: DISCONTINUED | OUTPATIENT
Start: 2023-03-14 | End: 2023-03-15

## 2023-03-14 RX ORDER — INSULIN LISPRO 100/ML
VIAL (ML) SUBCUTANEOUS
Refills: 0 | Status: DISCONTINUED | OUTPATIENT
Start: 2023-03-14 | End: 2023-03-16

## 2023-03-14 RX ORDER — ONDANSETRON 8 MG/1
4 TABLET, FILM COATED ORAL EVERY 6 HOURS
Refills: 0 | Status: DISCONTINUED | OUTPATIENT
Start: 2023-03-14 | End: 2023-03-16

## 2023-03-14 RX ORDER — INSULIN LISPRO 100/ML
VIAL (ML) SUBCUTANEOUS
Refills: 0 | Status: DISCONTINUED | OUTPATIENT
Start: 2023-03-14 | End: 2023-03-14

## 2023-03-14 RX ORDER — BUPIVACAINE 13.3 MG/ML
20 INJECTION, SUSPENSION, LIPOSOMAL INFILTRATION ONCE
Refills: 0 | Status: DISCONTINUED | OUTPATIENT
Start: 2023-03-14 | End: 2023-03-14

## 2023-03-14 RX ORDER — DEXTROSE 50 % IN WATER 50 %
12.5 SYRINGE (ML) INTRAVENOUS ONCE
Refills: 0 | Status: DISCONTINUED | OUTPATIENT
Start: 2023-03-14 | End: 2023-03-16

## 2023-03-14 RX ORDER — DEXTROSE 50 % IN WATER 50 %
15 SYRINGE (ML) INTRAVENOUS ONCE
Refills: 0 | Status: DISCONTINUED | OUTPATIENT
Start: 2023-03-14 | End: 2023-03-16

## 2023-03-14 RX ORDER — ACETAMINOPHEN 500 MG
650 TABLET ORAL EVERY 6 HOURS
Refills: 0 | Status: DISCONTINUED | OUTPATIENT
Start: 2023-03-14 | End: 2023-03-16

## 2023-03-14 RX ORDER — METOCLOPRAMIDE HCL 10 MG
10 TABLET ORAL ONCE
Refills: 0 | Status: COMPLETED | OUTPATIENT
Start: 2023-03-14 | End: 2023-03-14

## 2023-03-14 RX ORDER — SENNA PLUS 8.6 MG/1
2 TABLET ORAL AT BEDTIME
Refills: 0 | Status: DISCONTINUED | OUTPATIENT
Start: 2023-03-14 | End: 2023-03-16

## 2023-03-14 RX ORDER — KETOROLAC TROMETHAMINE 30 MG/ML
30 SYRINGE (ML) INJECTION ONCE
Refills: 0 | Status: DISCONTINUED | OUTPATIENT
Start: 2023-03-14 | End: 2023-03-14

## 2023-03-14 RX ORDER — ALBUTEROL 90 UG/1
2 AEROSOL, METERED ORAL EVERY 6 HOURS
Refills: 0 | Status: DISCONTINUED | OUTPATIENT
Start: 2023-03-14 | End: 2023-03-16

## 2023-03-14 RX ORDER — SODIUM CHLORIDE 9 MG/ML
3 INJECTION INTRAMUSCULAR; INTRAVENOUS; SUBCUTANEOUS ONCE
Refills: 0 | Status: DISCONTINUED | OUTPATIENT
Start: 2023-03-14 | End: 2023-03-14

## 2023-03-14 RX ADMIN — OXYCODONE HYDROCHLORIDE 10 MILLIGRAM(S): 5 TABLET ORAL at 22:10

## 2023-03-14 RX ADMIN — Medication 6: at 22:09

## 2023-03-14 RX ADMIN — Medication 30 MILLIGRAM(S): at 13:38

## 2023-03-14 RX ADMIN — Medication 100 MILLIGRAM(S): at 13:00

## 2023-03-14 RX ADMIN — Medication 10 MILLIGRAM(S): at 12:08

## 2023-03-14 RX ADMIN — HYDROMORPHONE HYDROCHLORIDE 0.5 MILLIGRAM(S): 2 INJECTION INTRAMUSCULAR; INTRAVENOUS; SUBCUTANEOUS at 12:26

## 2023-03-14 RX ADMIN — Medication 200 MILLIGRAM(S): at 09:00

## 2023-03-14 RX ADMIN — Medication 30 MILLIGRAM(S): at 13:53

## 2023-03-14 RX ADMIN — HYDROMORPHONE HYDROCHLORIDE 0.5 MILLIGRAM(S): 2 INJECTION INTRAMUSCULAR; INTRAVENOUS; SUBCUTANEOUS at 12:11

## 2023-03-14 RX ADMIN — SODIUM CHLORIDE 75 MILLILITER(S): 9 INJECTION, SOLUTION INTRAVENOUS at 20:38

## 2023-03-14 RX ADMIN — Medication 2000 MILLIGRAM(S): at 14:53

## 2023-03-14 RX ADMIN — Medication 3: at 17:25

## 2023-03-14 RX ADMIN — ONDANSETRON 4 MILLIGRAM(S): 8 TABLET, FILM COATED ORAL at 20:38

## 2023-03-14 RX ADMIN — Medication 975 MILLIGRAM(S): at 07:00

## 2023-03-14 RX ADMIN — Medication 6 UNIT(S): at 11:39

## 2023-03-14 RX ADMIN — Medication 10 UNIT(S): at 13:37

## 2023-03-14 RX ADMIN — Medication 2000 MILLIGRAM(S): at 21:38

## 2023-03-14 RX ADMIN — OXYCODONE HYDROCHLORIDE 10 MILLIGRAM(S): 5 TABLET ORAL at 12:36

## 2023-03-14 RX ADMIN — OXYCODONE HYDROCHLORIDE 10 MILLIGRAM(S): 5 TABLET ORAL at 13:30

## 2023-03-14 RX ADMIN — HYDROMORPHONE HYDROCHLORIDE 0.5 MILLIGRAM(S): 2 INJECTION INTRAMUSCULAR; INTRAVENOUS; SUBCUTANEOUS at 11:54

## 2023-03-14 RX ADMIN — HYDROMORPHONE HYDROCHLORIDE 0.5 MILLIGRAM(S): 2 INJECTION INTRAMUSCULAR; INTRAVENOUS; SUBCUTANEOUS at 12:36

## 2023-03-14 RX ADMIN — OXYCODONE HYDROCHLORIDE 10 MILLIGRAM(S): 5 TABLET ORAL at 23:10

## 2023-03-14 RX ADMIN — HYDROMORPHONE HYDROCHLORIDE 0.5 MILLIGRAM(S): 2 INJECTION INTRAMUSCULAR; INTRAVENOUS; SUBCUTANEOUS at 12:51

## 2023-03-14 RX ADMIN — LORATADINE 10 MILLIGRAM(S): 10 TABLET ORAL at 13:00

## 2023-03-14 RX ADMIN — ONDANSETRON 4 MILLIGRAM(S): 8 TABLET, FILM COATED ORAL at 11:44

## 2023-03-14 RX ADMIN — HYDROMORPHONE HYDROCHLORIDE 0.5 MILLIGRAM(S): 2 INJECTION INTRAMUSCULAR; INTRAVENOUS; SUBCUTANEOUS at 11:39

## 2023-03-14 NOTE — ASU PREOP CHECKLIST - SIDE RAILS UP
2/8/2019  10:10 AM    Jojo Cordero is a 17 month old male. Chief complaint(s): Patient presents with: Well Baby    HPI:     Jojo Cordero primary complaint is regarding [address].      Jojo Cordero is here today for a well c
11/10/2018      Pneumococcal (Prevnar 13)                          03/20/2018  08/10/2018  11/10/2018      Rotavirus 2 Dose      03/20/2018      Varicella Vaccine     02/08/2019    Deferred                Date(s) Deferred    HIB (3 Dose)          04/2
scleral icterus. Neck: Normal range of motion. Neck supple. Cardiovascular: Normal rate and regular rhythm. No murmur heard. Pulmonary/Chest: Effort normal and breath sounds normal. There is normal air entry. He has no wheezes. Abdominal: Soft.  B
the bottle; weaning off of the pacifier; dental care ), and development (i.e. upcoming developmental advances, such as running, walk backwards, feeding self with a spoon, using more words, pointing to named body parts, and throwing and kicking a ball; slee
done
no

## 2023-03-14 NOTE — PATIENT PROFILE ADULT - FALL HARM RISK - HARM RISK INTERVENTIONS

## 2023-03-15 ENCOUNTER — TRANSCRIPTION ENCOUNTER (OUTPATIENT)
Age: 33
End: 2023-03-15

## 2023-03-15 LAB
A1C WITH ESTIMATED AVERAGE GLUCOSE RESULT: 8 % — HIGH (ref 4–5.6)
ANION GAP SERPL CALC-SCNC: 14 MMOL/L — SIGNIFICANT CHANGE UP (ref 5–17)
BASOPHILS # BLD AUTO: 0.08 K/UL — SIGNIFICANT CHANGE UP (ref 0–0.2)
BASOPHILS NFR BLD AUTO: 0.6 % — SIGNIFICANT CHANGE UP (ref 0–2)
BUN SERPL-MCNC: 13.9 MG/DL — SIGNIFICANT CHANGE UP (ref 8–20)
CALCIUM SERPL-MCNC: 8.5 MG/DL — SIGNIFICANT CHANGE UP (ref 8.4–10.5)
CHLORIDE SERPL-SCNC: 98 MMOL/L — SIGNIFICANT CHANGE UP (ref 96–108)
CO2 SERPL-SCNC: 23 MMOL/L — SIGNIFICANT CHANGE UP (ref 22–29)
CREAT SERPL-MCNC: 0.7 MG/DL — SIGNIFICANT CHANGE UP (ref 0.5–1.3)
EGFR: 125 ML/MIN/1.73M2 — SIGNIFICANT CHANGE UP
EOSINOPHIL # BLD AUTO: 0.06 K/UL — SIGNIFICANT CHANGE UP (ref 0–0.5)
EOSINOPHIL NFR BLD AUTO: 0.5 % — SIGNIFICANT CHANGE UP (ref 0–6)
ESTIMATED AVERAGE GLUCOSE: 183 MG/DL — HIGH (ref 68–114)
GLUCOSE BLDC GLUCOMTR-MCNC: 187 MG/DL — HIGH (ref 70–99)
GLUCOSE BLDC GLUCOMTR-MCNC: 211 MG/DL — HIGH (ref 70–99)
GLUCOSE BLDC GLUCOMTR-MCNC: 221 MG/DL — HIGH (ref 70–99)
GLUCOSE BLDC GLUCOMTR-MCNC: 224 MG/DL — HIGH (ref 70–99)
GLUCOSE SERPL-MCNC: 220 MG/DL — HIGH (ref 70–99)
HCT VFR BLD CALC: 35.4 % — LOW (ref 39–50)
HGB BLD-MCNC: 11.4 G/DL — LOW (ref 13–17)
IMM GRANULOCYTES NFR BLD AUTO: 0.9 % — SIGNIFICANT CHANGE UP (ref 0–0.9)
LYMPHOCYTES # BLD AUTO: 1.87 K/UL — SIGNIFICANT CHANGE UP (ref 1–3.3)
LYMPHOCYTES # BLD AUTO: 15.1 % — SIGNIFICANT CHANGE UP (ref 13–44)
MCHC RBC-ENTMCNC: 27.8 PG — SIGNIFICANT CHANGE UP (ref 27–34)
MCHC RBC-ENTMCNC: 32.2 GM/DL — SIGNIFICANT CHANGE UP (ref 32–36)
MCV RBC AUTO: 86.3 FL — SIGNIFICANT CHANGE UP (ref 80–100)
MONOCYTES # BLD AUTO: 0.94 K/UL — HIGH (ref 0–0.9)
MONOCYTES NFR BLD AUTO: 7.6 % — SIGNIFICANT CHANGE UP (ref 2–14)
NEUTROPHILS # BLD AUTO: 9.36 K/UL — HIGH (ref 1.8–7.4)
NEUTROPHILS NFR BLD AUTO: 75.3 % — SIGNIFICANT CHANGE UP (ref 43–77)
PLATELET # BLD AUTO: 207 K/UL — SIGNIFICANT CHANGE UP (ref 150–400)
POTASSIUM SERPL-MCNC: 3.7 MMOL/L — SIGNIFICANT CHANGE UP (ref 3.5–5.3)
POTASSIUM SERPL-SCNC: 3.7 MMOL/L — SIGNIFICANT CHANGE UP (ref 3.5–5.3)
RBC # BLD: 4.1 M/UL — LOW (ref 4.2–5.8)
RBC # FLD: 14.1 % — SIGNIFICANT CHANGE UP (ref 10.3–14.5)
SODIUM SERPL-SCNC: 135 MMOL/L — SIGNIFICANT CHANGE UP (ref 135–145)
WBC # BLD: 12.42 K/UL — HIGH (ref 3.8–10.5)
WBC # FLD AUTO: 12.42 K/UL — HIGH (ref 3.8–10.5)

## 2023-03-15 PROCEDURE — 74176 CT ABD & PELVIS W/O CONTRAST: CPT | Mod: 26

## 2023-03-15 RX ORDER — CEFAZOLIN SODIUM 1 G
2000 VIAL (EA) INJECTION EVERY 8 HOURS
Refills: 0 | Status: DISCONTINUED | OUTPATIENT
Start: 2023-03-15 | End: 2023-03-15

## 2023-03-15 RX ORDER — CEFAZOLIN SODIUM 1 G
2000 VIAL (EA) INJECTION EVERY 8 HOURS
Refills: 0 | Status: DISCONTINUED | OUTPATIENT
Start: 2023-03-15 | End: 2023-03-16

## 2023-03-15 RX ORDER — METOCLOPRAMIDE HCL 10 MG
10 TABLET ORAL EVERY 6 HOURS
Refills: 0 | Status: DISCONTINUED | OUTPATIENT
Start: 2023-03-15 | End: 2023-03-16

## 2023-03-15 RX ORDER — KETOROLAC TROMETHAMINE 30 MG/ML
15 SYRINGE (ML) INJECTION EVERY 6 HOURS
Refills: 0 | Status: DISCONTINUED | OUTPATIENT
Start: 2023-03-15 | End: 2023-03-16

## 2023-03-15 RX ADMIN — HEPARIN SODIUM 5000 UNIT(S): 5000 INJECTION INTRAVENOUS; SUBCUTANEOUS at 21:42

## 2023-03-15 RX ADMIN — OXYCODONE HYDROCHLORIDE 10 MILLIGRAM(S): 5 TABLET ORAL at 22:39

## 2023-03-15 RX ADMIN — Medication 100 MILLIGRAM(S): at 11:22

## 2023-03-15 RX ADMIN — HEPARIN SODIUM 5000 UNIT(S): 5000 INJECTION INTRAVENOUS; SUBCUTANEOUS at 14:16

## 2023-03-15 RX ADMIN — ONDANSETRON 4 MILLIGRAM(S): 8 TABLET, FILM COATED ORAL at 08:49

## 2023-03-15 RX ADMIN — LORATADINE 10 MILLIGRAM(S): 10 TABLET ORAL at 11:23

## 2023-03-15 RX ADMIN — OXYCODONE HYDROCHLORIDE 10 MILLIGRAM(S): 5 TABLET ORAL at 23:39

## 2023-03-15 RX ADMIN — Medication 4: at 08:49

## 2023-03-15 RX ADMIN — Medication 2000 MILLIGRAM(S): at 21:42

## 2023-03-15 RX ADMIN — Medication 4: at 11:29

## 2023-03-15 RX ADMIN — Medication 15 MILLIGRAM(S): at 14:16

## 2023-03-15 RX ADMIN — Medication 4: at 17:54

## 2023-03-15 RX ADMIN — Medication 2: at 21:49

## 2023-03-15 RX ADMIN — HEPARIN SODIUM 5000 UNIT(S): 5000 INJECTION INTRAVENOUS; SUBCUTANEOUS at 05:24

## 2023-03-15 RX ADMIN — Medication 15 MILLIGRAM(S): at 14:31

## 2023-03-15 NOTE — PROGRESS NOTE ADULT - NS ATTEND AMEND GEN_ALL_CORE FT
reviewed CT  discussed with patient  urine now almost clear  will schedule 2nd look for the residual stone tomorrow

## 2023-03-16 LAB
ANION GAP SERPL CALC-SCNC: 13 MMOL/L — SIGNIFICANT CHANGE UP (ref 5–17)
BUN SERPL-MCNC: 15.2 MG/DL — SIGNIFICANT CHANGE UP (ref 8–20)
CALCIUM SERPL-MCNC: 8.5 MG/DL — SIGNIFICANT CHANGE UP (ref 8.4–10.5)
CHLORIDE SERPL-SCNC: 97 MMOL/L — SIGNIFICANT CHANGE UP (ref 96–108)
CO2 SERPL-SCNC: 23 MMOL/L — SIGNIFICANT CHANGE UP (ref 22–29)
CREAT SERPL-MCNC: 0.8 MG/DL — SIGNIFICANT CHANGE UP (ref 0.5–1.3)
CULTURE RESULTS: NO GROWTH — SIGNIFICANT CHANGE UP
EGFR: 120 ML/MIN/1.73M2 — SIGNIFICANT CHANGE UP
GLUCOSE BLDC GLUCOMTR-MCNC: 151 MG/DL — HIGH (ref 70–99)
GLUCOSE BLDC GLUCOMTR-MCNC: 158 MG/DL — HIGH (ref 70–99)
GLUCOSE BLDC GLUCOMTR-MCNC: 169 MG/DL — HIGH (ref 70–99)
GLUCOSE BLDC GLUCOMTR-MCNC: 206 MG/DL — HIGH (ref 70–99)
GLUCOSE BLDC GLUCOMTR-MCNC: 248 MG/DL — HIGH (ref 70–99)
GLUCOSE BLDC GLUCOMTR-MCNC: 276 MG/DL — HIGH (ref 70–99)
GLUCOSE SERPL-MCNC: 337 MG/DL — HIGH (ref 70–99)
HCT VFR BLD CALC: 30.3 % — LOW (ref 39–50)
HCT VFR BLD CALC: 32.6 % — LOW (ref 39–50)
HGB BLD-MCNC: 10.1 G/DL — LOW (ref 13–17)
HGB BLD-MCNC: 10.6 G/DL — LOW (ref 13–17)
MCHC RBC-ENTMCNC: 27.9 PG — SIGNIFICANT CHANGE UP (ref 27–34)
MCHC RBC-ENTMCNC: 28.3 PG — SIGNIFICANT CHANGE UP (ref 27–34)
MCHC RBC-ENTMCNC: 32.5 GM/DL — SIGNIFICANT CHANGE UP (ref 32–36)
MCHC RBC-ENTMCNC: 33.3 GM/DL — SIGNIFICANT CHANGE UP (ref 32–36)
MCV RBC AUTO: 84.9 FL — SIGNIFICANT CHANGE UP (ref 80–100)
MCV RBC AUTO: 85.8 FL — SIGNIFICANT CHANGE UP (ref 80–100)
PLATELET # BLD AUTO: 217 K/UL — SIGNIFICANT CHANGE UP (ref 150–400)
PLATELET # BLD AUTO: 245 K/UL — SIGNIFICANT CHANGE UP (ref 150–400)
POTASSIUM SERPL-MCNC: 4.2 MMOL/L — SIGNIFICANT CHANGE UP (ref 3.5–5.3)
POTASSIUM SERPL-SCNC: 4.2 MMOL/L — SIGNIFICANT CHANGE UP (ref 3.5–5.3)
RBC # BLD: 3.57 M/UL — LOW (ref 4.2–5.8)
RBC # BLD: 3.8 M/UL — LOW (ref 4.2–5.8)
RBC # FLD: 14 % — SIGNIFICANT CHANGE UP (ref 10.3–14.5)
RBC # FLD: 14.2 % — SIGNIFICANT CHANGE UP (ref 10.3–14.5)
SODIUM SERPL-SCNC: 133 MMOL/L — LOW (ref 135–145)
SPECIMEN SOURCE: SIGNIFICANT CHANGE UP
WBC # BLD: 10.76 K/UL — HIGH (ref 3.8–10.5)
WBC # BLD: 12.99 K/UL — HIGH (ref 3.8–10.5)
WBC # FLD AUTO: 10.76 K/UL — HIGH (ref 3.8–10.5)
WBC # FLD AUTO: 12.99 K/UL — HIGH (ref 3.8–10.5)

## 2023-03-16 PROCEDURE — 50437 DILAT XST TRC NEW ACCESS RCS: CPT | Mod: RT,58,59

## 2023-03-16 PROCEDURE — 71045 X-RAY EXAM CHEST 1 VIEW: CPT | Mod: 26

## 2023-03-16 PROCEDURE — 50080 PERQ NL/PL LITHOTRP SMPL<2CM: CPT | Mod: RT,58

## 2023-03-16 DEVICE — AMPLATZ RENAL DILATOR 6FR-30FR X 16CM: Type: IMPLANTABLE DEVICE | Site: RIGHT | Status: FUNCTIONAL

## 2023-03-16 DEVICE — GUIDEWIRE SENSOR DUAL-FLEX NITINOL STRAIGHT .035" X 150CM: Type: IMPLANTABLE DEVICE | Site: RIGHT | Status: FUNCTIONAL

## 2023-03-16 DEVICE — GWIRE ROSEN 0.035INX260CM: Type: IMPLANTABLE DEVICE | Site: RIGHT | Status: FUNCTIONAL

## 2023-03-16 DEVICE — STENT URET NEPHRO 8FRX26CM: Type: IMPLANTABLE DEVICE | Site: RIGHT | Status: FUNCTIONAL

## 2023-03-16 DEVICE — URETERAL CATH FLEXIMA OPEN END 5FR 70CM: Type: IMPLANTABLE DEVICE | Site: RIGHT | Status: FUNCTIONAL

## 2023-03-16 DEVICE — TRILOGY PROBE KIT 3.9MM X 440MM (BLUE): Type: IMPLANTABLE DEVICE | Site: RIGHT | Status: FUNCTIONAL

## 2023-03-16 DEVICE — FLOSEAL FAST PREP 10ML: Type: IMPLANTABLE DEVICE | Site: RIGHT | Status: FUNCTIONAL

## 2023-03-16 DEVICE — IMPLANTABLE DEVICE: Type: IMPLANTABLE DEVICE | Site: RIGHT | Status: FUNCTIONAL

## 2023-03-16 RX ORDER — KETOROLAC TROMETHAMINE 30 MG/ML
15 SYRINGE (ML) INJECTION EVERY 6 HOURS
Refills: 0 | Status: DISCONTINUED | OUTPATIENT
Start: 2023-03-16 | End: 2023-03-17

## 2023-03-16 RX ORDER — SODIUM CHLORIDE 9 MG/ML
1000 INJECTION, SOLUTION INTRAVENOUS
Refills: 0 | Status: DISCONTINUED | OUTPATIENT
Start: 2023-03-16 | End: 2023-03-17

## 2023-03-16 RX ORDER — INSULIN LISPRO 100/ML
VIAL (ML) SUBCUTANEOUS
Refills: 0 | Status: DISCONTINUED | OUTPATIENT
Start: 2023-03-16 | End: 2023-03-17

## 2023-03-16 RX ORDER — FENTANYL CITRATE 50 UG/ML
50 INJECTION INTRAVENOUS
Refills: 0 | Status: DISCONTINUED | OUTPATIENT
Start: 2023-03-16 | End: 2023-03-16

## 2023-03-16 RX ORDER — SENNA PLUS 8.6 MG/1
2 TABLET ORAL AT BEDTIME
Refills: 0 | Status: DISCONTINUED | OUTPATIENT
Start: 2023-03-16 | End: 2023-03-17

## 2023-03-16 RX ORDER — METOCLOPRAMIDE HCL 10 MG
10 TABLET ORAL EVERY 6 HOURS
Refills: 0 | Status: DISCONTINUED | OUTPATIENT
Start: 2023-03-16 | End: 2023-03-17

## 2023-03-16 RX ORDER — HEPARIN SODIUM 5000 [USP'U]/ML
5000 INJECTION INTRAVENOUS; SUBCUTANEOUS EVERY 8 HOURS
Refills: 0 | Status: DISCONTINUED | OUTPATIENT
Start: 2023-03-16 | End: 2023-03-17

## 2023-03-16 RX ORDER — DEXTROSE 50 % IN WATER 50 %
25 SYRINGE (ML) INTRAVENOUS ONCE
Refills: 0 | Status: DISCONTINUED | OUTPATIENT
Start: 2023-03-16 | End: 2023-03-17

## 2023-03-16 RX ORDER — ONDANSETRON 8 MG/1
4 TABLET, FILM COATED ORAL EVERY 6 HOURS
Refills: 0 | Status: DISCONTINUED | OUTPATIENT
Start: 2023-03-16 | End: 2023-03-17

## 2023-03-16 RX ORDER — DEXTROSE 50 % IN WATER 50 %
12.5 SYRINGE (ML) INTRAVENOUS ONCE
Refills: 0 | Status: DISCONTINUED | OUTPATIENT
Start: 2023-03-16 | End: 2023-03-17

## 2023-03-16 RX ORDER — APREPITANT 80 MG/1
40 CAPSULE ORAL ONCE
Refills: 0 | Status: COMPLETED | OUTPATIENT
Start: 2023-03-16 | End: 2023-03-16

## 2023-03-16 RX ORDER — ACETAMINOPHEN 500 MG
650 TABLET ORAL EVERY 6 HOURS
Refills: 0 | Status: DISCONTINUED | OUTPATIENT
Start: 2023-03-16 | End: 2023-03-17

## 2023-03-16 RX ORDER — GLUCAGON INJECTION, SOLUTION 0.5 MG/.1ML
1 INJECTION, SOLUTION SUBCUTANEOUS ONCE
Refills: 0 | Status: DISCONTINUED | OUTPATIENT
Start: 2023-03-16 | End: 2023-03-17

## 2023-03-16 RX ORDER — FENTANYL CITRATE 50 UG/ML
25 INJECTION INTRAVENOUS
Refills: 0 | Status: DISCONTINUED | OUTPATIENT
Start: 2023-03-16 | End: 2023-03-16

## 2023-03-16 RX ORDER — DEXTROSE 50 % IN WATER 50 %
15 SYRINGE (ML) INTRAVENOUS ONCE
Refills: 0 | Status: DISCONTINUED | OUTPATIENT
Start: 2023-03-16 | End: 2023-03-17

## 2023-03-16 RX ORDER — ONDANSETRON 8 MG/1
4 TABLET, FILM COATED ORAL ONCE
Refills: 0 | Status: COMPLETED | OUTPATIENT
Start: 2023-03-16 | End: 2023-03-16

## 2023-03-16 RX ORDER — OXYCODONE HYDROCHLORIDE 5 MG/1
10 TABLET ORAL EVERY 6 HOURS
Refills: 0 | Status: DISCONTINUED | OUTPATIENT
Start: 2023-03-16 | End: 2023-03-17

## 2023-03-16 RX ORDER — SODIUM CHLORIDE 9 MG/ML
1000 INJECTION, SOLUTION INTRAVENOUS
Refills: 0 | Status: DISCONTINUED | OUTPATIENT
Start: 2023-03-16 | End: 2023-03-16

## 2023-03-16 RX ORDER — ALLOPURINOL 300 MG
100 TABLET ORAL DAILY
Refills: 0 | Status: DISCONTINUED | OUTPATIENT
Start: 2023-03-16 | End: 2023-03-17

## 2023-03-16 RX ORDER — LORATADINE 10 MG/1
10 TABLET ORAL DAILY
Refills: 0 | Status: DISCONTINUED | OUTPATIENT
Start: 2023-03-16 | End: 2023-03-17

## 2023-03-16 RX ORDER — ALBUTEROL 90 UG/1
2 AEROSOL, METERED ORAL EVERY 6 HOURS
Refills: 0 | Status: DISCONTINUED | OUTPATIENT
Start: 2023-03-16 | End: 2023-03-17

## 2023-03-16 RX ORDER — CEFAZOLIN SODIUM 1 G
2000 VIAL (EA) INJECTION EVERY 8 HOURS
Refills: 0 | Status: DISCONTINUED | OUTPATIENT
Start: 2023-03-16 | End: 2023-03-17

## 2023-03-16 RX ADMIN — Medication 2000 MILLIGRAM(S): at 05:45

## 2023-03-16 RX ADMIN — HEPARIN SODIUM 5000 UNIT(S): 5000 INJECTION INTRAVENOUS; SUBCUTANEOUS at 22:05

## 2023-03-16 RX ADMIN — Medication 4: at 22:36

## 2023-03-16 RX ADMIN — Medication 2000 MILLIGRAM(S): at 14:49

## 2023-03-16 RX ADMIN — Medication 100 MILLIGRAM(S): at 14:50

## 2023-03-16 RX ADMIN — OXYCODONE HYDROCHLORIDE 10 MILLIGRAM(S): 5 TABLET ORAL at 23:05

## 2023-03-16 RX ADMIN — FENTANYL CITRATE 50 MICROGRAM(S): 50 INJECTION INTRAVENOUS at 10:20

## 2023-03-16 RX ADMIN — FENTANYL CITRATE 50 MICROGRAM(S): 50 INJECTION INTRAVENOUS at 10:15

## 2023-03-16 RX ADMIN — ONDANSETRON 4 MILLIGRAM(S): 8 TABLET, FILM COATED ORAL at 10:53

## 2023-03-16 RX ADMIN — APREPITANT 40 MILLIGRAM(S): 80 CAPSULE ORAL at 07:19

## 2023-03-16 RX ADMIN — HEPARIN SODIUM 5000 UNIT(S): 5000 INJECTION INTRAVENOUS; SUBCUTANEOUS at 14:49

## 2023-03-16 RX ADMIN — SODIUM CHLORIDE 75 MILLILITER(S): 9 INJECTION, SOLUTION INTRAVENOUS at 10:09

## 2023-03-16 RX ADMIN — SODIUM CHLORIDE 75 MILLILITER(S): 9 INJECTION, SOLUTION INTRAVENOUS at 22:04

## 2023-03-16 RX ADMIN — HEPARIN SODIUM 5000 UNIT(S): 5000 INJECTION INTRAVENOUS; SUBCUTANEOUS at 05:31

## 2023-03-16 RX ADMIN — Medication 6: at 18:12

## 2023-03-16 RX ADMIN — Medication 2: at 05:30

## 2023-03-16 RX ADMIN — Medication 2000 MILLIGRAM(S): at 22:05

## 2023-03-16 RX ADMIN — OXYCODONE HYDROCHLORIDE 10 MILLIGRAM(S): 5 TABLET ORAL at 22:33

## 2023-03-16 RX ADMIN — LORATADINE 10 MILLIGRAM(S): 10 TABLET ORAL at 14:50

## 2023-03-16 NOTE — BRIEF OPERATIVE NOTE - NSICDXBRIEFPROCEDURE_GEN_ALL_CORE_FT
PROCEDURES:  Nephrostolithotomy, percutaneous, with lithotripsy, large stone 14-Mar-2023 10:59:19  David Payton  
PROCEDURES:  Nephrostolithotomy, percutaneous, with lithotripsy, large stone 14-Mar-2023 10:59:19  David Payton

## 2023-03-16 NOTE — PROGRESS NOTE ADULT - ASSESSMENT
a/p: S/p R PCNL 2nd look  - reg diet  - cbc 9pm  - incentive spirometer  - am labs  - OOB to chair  - plan for d/c R NT in am
33 yo male POD#1 s/p R PCNL  - pain control  - antiemetics, zofran, reglan added to regimen  - CT reviewed, small stone remains in right renal pelvis  - pt scheduled for 2nd look, R PCNL tomorrow  - NPO after midnight  - IVF  - reg diet, po fluids

## 2023-03-16 NOTE — BRIEF OPERATIVE NOTE - OPERATION/FINDINGS
1-1-500  only small stone fragments cleared, I do not think the large stone was treated  second access performed to the suspected location of the stone under flouroscopy, but very difficult to get access due to stone being mostly radiolucent and BMI - dilated to 30F, and could not see any stone    8/26 nephroureteral tube through original access  discussed with family, will not plan for 3rd look, will plan for a CT after discharge in about 1 month. options are  1. monitor the stone and plan for possible procedure in several months after he loses more weight  2. plan for another procedure with access before under CT
1-1-500  encrusted stent, stones in lower calyx  stent removed in two pieces, one through the kidney, the other half using a basket from the bladder  8/24 nephroureteral stent  will need CT

## 2023-03-16 NOTE — BRIEF OPERATIVE NOTE - NSICDXBRIEFPOSTOP_GEN_ALL_CORE_FT
POST-OP DIAGNOSIS:  Nephrolith 14-Mar-2023 10:59:33  David Payton  
POST-OP DIAGNOSIS:  Nephrolith 14-Mar-2023 10:59:33  David Payton

## 2023-03-17 ENCOUNTER — TRANSCRIPTION ENCOUNTER (OUTPATIENT)
Age: 33
End: 2023-03-17

## 2023-03-17 VITALS
TEMPERATURE: 99 F | OXYGEN SATURATION: 96 % | HEART RATE: 100 BPM | SYSTOLIC BLOOD PRESSURE: 131 MMHG | DIASTOLIC BLOOD PRESSURE: 78 MMHG | RESPIRATION RATE: 18 BRPM

## 2023-03-17 LAB
ANION GAP SERPL CALC-SCNC: 13 MMOL/L — SIGNIFICANT CHANGE UP (ref 5–17)
BUN SERPL-MCNC: 13.2 MG/DL — SIGNIFICANT CHANGE UP (ref 8–20)
CALCIUM SERPL-MCNC: 8.3 MG/DL — LOW (ref 8.4–10.5)
CHLORIDE SERPL-SCNC: 102 MMOL/L — SIGNIFICANT CHANGE UP (ref 96–108)
CO2 SERPL-SCNC: 24 MMOL/L — SIGNIFICANT CHANGE UP (ref 22–29)
CREAT SERPL-MCNC: 0.61 MG/DL — SIGNIFICANT CHANGE UP (ref 0.5–1.3)
EGFR: 130 ML/MIN/1.73M2 — SIGNIFICANT CHANGE UP
GLUCOSE BLDC GLUCOMTR-MCNC: 192 MG/DL — HIGH (ref 70–99)
GLUCOSE BLDC GLUCOMTR-MCNC: 218 MG/DL — HIGH (ref 70–99)
GLUCOSE SERPL-MCNC: 196 MG/DL — HIGH (ref 70–99)
HCT VFR BLD CALC: 28.8 % — LOW (ref 39–50)
HGB BLD-MCNC: 9.4 G/DL — LOW (ref 13–17)
MCHC RBC-ENTMCNC: 28 PG — SIGNIFICANT CHANGE UP (ref 27–34)
MCHC RBC-ENTMCNC: 32.6 GM/DL — SIGNIFICANT CHANGE UP (ref 32–36)
MCV RBC AUTO: 85.7 FL — SIGNIFICANT CHANGE UP (ref 80–100)
PLATELET # BLD AUTO: 221 K/UL — SIGNIFICANT CHANGE UP (ref 150–400)
POTASSIUM SERPL-MCNC: 3.5 MMOL/L — SIGNIFICANT CHANGE UP (ref 3.5–5.3)
POTASSIUM SERPL-SCNC: 3.5 MMOL/L — SIGNIFICANT CHANGE UP (ref 3.5–5.3)
RBC # BLD: 3.36 M/UL — LOW (ref 4.2–5.8)
RBC # FLD: 14.3 % — SIGNIFICANT CHANGE UP (ref 10.3–14.5)
SODIUM SERPL-SCNC: 139 MMOL/L — SIGNIFICANT CHANGE UP (ref 135–145)
WBC # BLD: 12.23 K/UL — HIGH (ref 3.8–10.5)
WBC # FLD AUTO: 12.23 K/UL — HIGH (ref 3.8–10.5)

## 2023-03-17 PROCEDURE — 36415 COLL VENOUS BLD VENIPUNCTURE: CPT

## 2023-03-17 PROCEDURE — C1894: CPT

## 2023-03-17 PROCEDURE — U0005: CPT

## 2023-03-17 PROCEDURE — 87086 URINE CULTURE/COLONY COUNT: CPT

## 2023-03-17 PROCEDURE — 74176 CT ABD & PELVIS W/O CONTRAST: CPT

## 2023-03-17 PROCEDURE — 80048 BASIC METABOLIC PNL TOTAL CA: CPT

## 2023-03-17 PROCEDURE — C1769: CPT

## 2023-03-17 PROCEDURE — C1758: CPT

## 2023-03-17 PROCEDURE — 76000 FLUOROSCOPY <1 HR PHYS/QHP: CPT

## 2023-03-17 PROCEDURE — 82962 GLUCOSE BLOOD TEST: CPT

## 2023-03-17 PROCEDURE — 83036 HEMOGLOBIN GLYCOSYLATED A1C: CPT

## 2023-03-17 PROCEDURE — C9399: CPT

## 2023-03-17 PROCEDURE — U0003: CPT

## 2023-03-17 PROCEDURE — 71045 X-RAY EXAM CHEST 1 VIEW: CPT

## 2023-03-17 PROCEDURE — 85027 COMPLETE CBC AUTOMATED: CPT

## 2023-03-17 PROCEDURE — 85025 COMPLETE CBC W/AUTO DIFF WBC: CPT

## 2023-03-17 PROCEDURE — C1726: CPT

## 2023-03-17 PROCEDURE — C1887: CPT

## 2023-03-17 PROCEDURE — C2617: CPT

## 2023-03-17 PROCEDURE — C1889: CPT

## 2023-03-17 RX ORDER — ALLOPURINOL 300 MG
0 TABLET ORAL
Qty: 0 | Refills: 0 | DISCHARGE

## 2023-03-17 RX ORDER — FERROUS SULFATE 325(65) MG
1 TABLET ORAL
Qty: 14 | Refills: 0
Start: 2023-03-17 | End: 2023-03-30

## 2023-03-17 RX ORDER — CEPHALEXIN 500 MG
1 CAPSULE ORAL
Qty: 9 | Refills: 0
Start: 2023-03-17 | End: 2023-03-19

## 2023-03-17 RX ADMIN — Medication 2: at 08:09

## 2023-03-17 RX ADMIN — LORATADINE 10 MILLIGRAM(S): 10 TABLET ORAL at 12:31

## 2023-03-17 RX ADMIN — Medication 2000 MILLIGRAM(S): at 05:40

## 2023-03-17 RX ADMIN — Medication 4: at 12:23

## 2023-03-17 RX ADMIN — Medication 100 MILLIGRAM(S): at 12:31

## 2023-03-17 RX ADMIN — Medication 15 MILLIGRAM(S): at 05:12

## 2023-03-17 RX ADMIN — SODIUM CHLORIDE 75 MILLILITER(S): 9 INJECTION, SOLUTION INTRAVENOUS at 05:39

## 2023-03-17 RX ADMIN — Medication 15 MILLIGRAM(S): at 04:57

## 2023-03-17 RX ADMIN — HEPARIN SODIUM 5000 UNIT(S): 5000 INJECTION INTRAVENOUS; SUBCUTANEOUS at 05:41

## 2023-03-17 NOTE — DISCHARGE NOTE PROVIDER - CARE PROVIDER_API CALL
David Payton)  Urology  [address], [address]  [place] [address]  Phone: [phone]  Fax: [phone]  Follow Up Time: 2 weeks

## 2023-03-17 NOTE — DISCHARGE NOTE PROVIDER - NSDCMRMEDTOKEN_GEN_ALL_CORE_FT
Advair Diskus 250 mcg-50 mcg inhalation powder: 1 puff(s) inhaled 2 times a day, As Needed  Albuterol (Eqv-ProAir HFA) 90 mcg/inh inhalation aerosol: 2 puff(s) inhaled every 6 hours, As Needed  allopurinol 100 mg oral tablet: 1 tab(s) orally once a day  cephalexin 500 mg oral tablet: 1 tab(s) orally 3 times a day   ferrous sulfate 324 mg (65 mg elemental iron) oral delayed release tablet: 1 tab(s) orally once a day   metFORMIN 500 mg oral tablet: 1 tab(s) orally once a day  potassium citrate 15 mEq oral tablet, extended release: 1 tab(s) orally 4 times a day   Tylenol 500 mg oral tablet: 2 tab(s) orally every 6 hours, As Needed  Xyzal 5 mg oral tablet: 1 tab(s) orally once a day (in the evening)

## 2023-03-17 NOTE — DISCHARGE NOTE PROVIDER - NSDCCPCAREPLAN_GEN_ALL_CORE_FT
PRINCIPAL DISCHARGE DIAGNOSIS  Diagnosis: Multiple renal calculi  Assessment and Plan of Treatment: - right flank dressing can be changed with dry gauze as needed  - you may shower  - no baths or hot tubs  - no strenuous activity, no heavy lifting or straining  - drink plenty of fluids (70-80oz/ day)  - call the office if you develop a fever, chills, nausea or vomiting  - take antiobtics until finished

## 2023-03-17 NOTE — DISCHARGE NOTE NURSING/CASE MANAGEMENT/SOCIAL WORK - NSDCPEFALRISK_GEN_ALL_CORE
For information on Fall & Injury Prevention, visit: [email or web address] OR  [email or web address] OR  https://www.cdc.gov/steadi/patient.html

## 2023-03-17 NOTE — DISCHARGE NOTE PROVIDER - HOSPITAL COURSE
33 yo male with h/o multiple renal calculi, DM, obesity.  Pt had 1st stage R PCNL 3/14/23.  Pt was admiited overnight for IVF, abx and repeat CT the following day.  CT showed remaining lower calyceal stone, pt was taken tot he OR for a second look R PCNL 3/16.  A portion of the stone was removed.  Due to the location of the stone, access and some bleeding, the remainder of the procedure was aborted.  Pt had an acute blood loss anemia, vital signs remained wnl, pt was asymptomatic.  The right NT had relatively clear UO and was removed on 3/17/23.  Pt remains afebrile, VSS, tolerating a diet and voiding well.  pt is stable and will be d/c'd to home with PO abx and ferrous sulfate.  The pt will f/u with Dr. Payton for future planning of the lower pole stone.  pt understands and agrees.

## 2023-03-17 NOTE — DISCHARGE NOTE PROVIDER - NSDCCPTREATMENT_GEN_ALL_CORE_FT
PRINCIPAL PROCEDURE  Procedure: Nephrostolithotomy, percutaneous, with fluoroscopic guidance  Findings and Treatment: right side      SECONDARY PROCEDURE  Procedure: Second look nephroscopy after percutaneous nephrolithotomy  Findings and Treatment: right

## 2023-03-17 NOTE — DISCHARGE NOTE NURSING/CASE MANAGEMENT/SOCIAL WORK - PATIENT PORTAL LINK FT
You can access the FollowMyHealth Patient Portal offered by [facility] by registering at the following website: [email or web address]. By joining [employer]’s FollowMyHealth portal, you will also be able to view your health information using other applications (apps) compatible with our system.

## 2023-03-19 ENCOUNTER — EMERGENCY (EMERGENCY)
Facility: HOSPITAL | Age: 33
LOS: 1 days | Discharge: HOME | End: 2023-03-19
Attending: STUDENT IN AN ORGANIZED HEALTH CARE EDUCATION/TRAINING PROGRAM
Payer: COMMERCIAL

## 2023-03-19 VITALS
DIASTOLIC BLOOD PRESSURE: 125 MMHG | OXYGEN SATURATION: 98 % | RESPIRATION RATE: 18 BRPM | WEIGHT: 315 LBS | HEIGHT: 67 IN | HEART RATE: 90 BPM | TEMPERATURE: 98 F | SYSTOLIC BLOOD PRESSURE: 204 MMHG

## 2023-03-19 VITALS
SYSTOLIC BLOOD PRESSURE: 124 MMHG | OXYGEN SATURATION: 99 % | RESPIRATION RATE: 18 BRPM | DIASTOLIC BLOOD PRESSURE: 85 MMHG | HEART RATE: 76 BPM | TEMPERATURE: 98 F

## 2023-03-19 DIAGNOSIS — Z90.49 ACQUIRED ABSENCE OF OTHER SPECIFIED PARTS OF DIGESTIVE TRACT: Chronic | ICD-10-CM

## 2023-03-19 DIAGNOSIS — Z87.81 PERSONAL HISTORY OF (HEALED) TRAUMATIC FRACTURE: Chronic | ICD-10-CM

## 2023-03-19 DIAGNOSIS — Z96.0 PRESENCE OF UROGENITAL IMPLANTS: Chronic | ICD-10-CM

## 2023-03-19 LAB
ALBUMIN SERPL ELPH-MCNC: 3.7 G/DL — SIGNIFICANT CHANGE UP (ref 3.3–5.2)
ALP SERPL-CCNC: 88 U/L — SIGNIFICANT CHANGE UP (ref 40–120)
ALT FLD-CCNC: 37 U/L — SIGNIFICANT CHANGE UP
ANION GAP SERPL CALC-SCNC: 10 MMOL/L — SIGNIFICANT CHANGE UP (ref 5–17)
ANISOCYTOSIS BLD QL: SLIGHT — SIGNIFICANT CHANGE UP
APPEARANCE UR: CLEAR — SIGNIFICANT CHANGE UP
AST SERPL-CCNC: 37 U/L — SIGNIFICANT CHANGE UP
BACTERIA # UR AUTO: ABNORMAL
BASOPHILS # BLD AUTO: 0.09 K/UL — SIGNIFICANT CHANGE UP (ref 0–0.2)
BASOPHILS NFR BLD AUTO: 0.9 % — SIGNIFICANT CHANGE UP (ref 0–2)
BILIRUB SERPL-MCNC: 0.4 MG/DL — SIGNIFICANT CHANGE UP (ref 0.4–2)
BILIRUB UR-MCNC: NEGATIVE — SIGNIFICANT CHANGE UP
BLD GP AB SCN SERPL QL: SIGNIFICANT CHANGE UP
BUN SERPL-MCNC: 10.1 MG/DL — SIGNIFICANT CHANGE UP (ref 8–20)
CALCIUM SERPL-MCNC: 8.3 MG/DL — LOW (ref 8.4–10.5)
CHLORIDE SERPL-SCNC: 99 MMOL/L — SIGNIFICANT CHANGE UP (ref 96–108)
CO2 SERPL-SCNC: 24 MMOL/L — SIGNIFICANT CHANGE UP (ref 22–29)
COLOR SPEC: YELLOW — SIGNIFICANT CHANGE UP
CREAT SERPL-MCNC: 0.64 MG/DL — SIGNIFICANT CHANGE UP (ref 0.5–1.3)
DIFF PNL FLD: ABNORMAL
EGFR: 128 ML/MIN/1.73M2 — SIGNIFICANT CHANGE UP
EOSINOPHIL # BLD AUTO: 0.08 K/UL — SIGNIFICANT CHANGE UP (ref 0–0.5)
EOSINOPHIL NFR BLD AUTO: 0.8 % — SIGNIFICANT CHANGE UP (ref 0–6)
EPI CELLS # UR: SIGNIFICANT CHANGE UP
GLUCOSE SERPL-MCNC: 176 MG/DL — HIGH (ref 70–99)
GLUCOSE UR QL: NEGATIVE MG/DL — SIGNIFICANT CHANGE UP
HCT VFR BLD CALC: 27 % — LOW (ref 39–50)
HGB BLD-MCNC: 8.8 G/DL — LOW (ref 13–17)
KETONES UR-MCNC: NEGATIVE — SIGNIFICANT CHANGE UP
LEUKOCYTE ESTERASE UR-ACNC: ABNORMAL
LYMPHOCYTES # BLD AUTO: 1.54 K/UL — SIGNIFICANT CHANGE UP (ref 1–3.3)
LYMPHOCYTES # BLD AUTO: 14.8 % — SIGNIFICANT CHANGE UP (ref 13–44)
MANUAL SMEAR VERIFICATION: SIGNIFICANT CHANGE UP
MCHC RBC-ENTMCNC: 28 PG — SIGNIFICANT CHANGE UP (ref 27–34)
MCHC RBC-ENTMCNC: 32.6 GM/DL — SIGNIFICANT CHANGE UP (ref 32–36)
MCV RBC AUTO: 86 FL — SIGNIFICANT CHANGE UP (ref 80–100)
MICROCYTES BLD QL: SLIGHT — SIGNIFICANT CHANGE UP
MONOCYTES # BLD AUTO: 0.45 K/UL — SIGNIFICANT CHANGE UP (ref 0–0.9)
MONOCYTES NFR BLD AUTO: 4.3 % — SIGNIFICANT CHANGE UP (ref 2–14)
MYELOCYTES NFR BLD: 0.9 % — HIGH (ref 0–0)
NEUTROPHILS # BLD AUTO: 8.04 K/UL — HIGH (ref 1.8–7.4)
NEUTROPHILS NFR BLD AUTO: 74.8 % — SIGNIFICANT CHANGE UP (ref 43–77)
NEUTS BAND # BLD: 2.6 % — SIGNIFICANT CHANGE UP (ref 0–8)
NITRITE UR-MCNC: NEGATIVE — SIGNIFICANT CHANGE UP
NRBC # BLD: 2 /100 — HIGH (ref 0–0)
PH UR: 6 — SIGNIFICANT CHANGE UP (ref 5–8)
PLAT MORPH BLD: NORMAL — SIGNIFICANT CHANGE UP
PLATELET # BLD AUTO: 220 K/UL — SIGNIFICANT CHANGE UP (ref 150–400)
POLYCHROMASIA BLD QL SMEAR: SLIGHT — SIGNIFICANT CHANGE UP
POTASSIUM SERPL-MCNC: 4.1 MMOL/L — SIGNIFICANT CHANGE UP (ref 3.5–5.3)
POTASSIUM SERPL-SCNC: 4.1 MMOL/L — SIGNIFICANT CHANGE UP (ref 3.5–5.3)
PROT SERPL-MCNC: 6.3 G/DL — LOW (ref 6.6–8.7)
PROT UR-MCNC: NEGATIVE — SIGNIFICANT CHANGE UP
RBC # BLD: 3.14 M/UL — LOW (ref 4.2–5.8)
RBC # FLD: 14.5 % — SIGNIFICANT CHANGE UP (ref 10.3–14.5)
RBC BLD AUTO: ABNORMAL
RBC CASTS # UR COMP ASSIST: ABNORMAL /HPF (ref 0–4)
SMUDGE CELLS # BLD: PRESENT — SIGNIFICANT CHANGE UP
SODIUM SERPL-SCNC: 133 MMOL/L — LOW (ref 135–145)
SP GR SPEC: 1.01 — SIGNIFICANT CHANGE UP (ref 1.01–1.02)
UROBILINOGEN FLD QL: NEGATIVE MG/DL — SIGNIFICANT CHANGE UP
VARIANT LYMPHS # BLD: 0.9 % — SIGNIFICANT CHANGE UP (ref 0–6)
WBC # BLD: 10.39 K/UL — SIGNIFICANT CHANGE UP (ref 3.8–10.5)
WBC # FLD AUTO: 10.39 K/UL — SIGNIFICANT CHANGE UP (ref 3.8–10.5)
WBC UR QL: SIGNIFICANT CHANGE UP /HPF (ref 0–5)

## 2023-03-19 PROCEDURE — 74177 CT ABD & PELVIS W/CONTRAST: CPT | Mod: 26,MA

## 2023-03-19 PROCEDURE — 99285 EMERGENCY DEPT VISIT HI MDM: CPT

## 2023-03-19 RX ORDER — SODIUM CHLORIDE 9 MG/ML
1000 INJECTION, SOLUTION INTRAVENOUS ONCE
Refills: 0 | Status: COMPLETED | OUTPATIENT
Start: 2023-03-19 | End: 2023-03-19

## 2023-03-19 RX ORDER — MORPHINE SULFATE 50 MG/1
8 CAPSULE, EXTENDED RELEASE ORAL ONCE
Refills: 0 | Status: DISCONTINUED | OUTPATIENT
Start: 2023-03-19 | End: 2023-03-19

## 2023-03-19 RX ORDER — METOCLOPRAMIDE HCL 10 MG
10 TABLET ORAL ONCE
Refills: 0 | Status: COMPLETED | OUTPATIENT
Start: 2023-03-19 | End: 2023-03-19

## 2023-03-19 RX ORDER — DIPHENHYDRAMINE HCL 50 MG
25 CAPSULE ORAL ONCE
Refills: 0 | Status: COMPLETED | OUTPATIENT
Start: 2023-03-19 | End: 2023-03-19

## 2023-03-19 RX ORDER — SODIUM CHLORIDE 9 MG/ML
1000 INJECTION INTRAMUSCULAR; INTRAVENOUS; SUBCUTANEOUS ONCE
Refills: 0 | Status: COMPLETED | OUTPATIENT
Start: 2023-03-19 | End: 2023-03-19

## 2023-03-19 RX ORDER — ONDANSETRON 8 MG/1
4 TABLET, FILM COATED ORAL ONCE
Refills: 0 | Status: COMPLETED | OUTPATIENT
Start: 2023-03-19 | End: 2023-03-19

## 2023-03-19 RX ORDER — HYDROMORPHONE HYDROCHLORIDE 2 MG/ML
1 INJECTION INTRAMUSCULAR; INTRAVENOUS; SUBCUTANEOUS ONCE
Refills: 0 | Status: DISCONTINUED | OUTPATIENT
Start: 2023-03-19 | End: 2023-03-19

## 2023-03-19 RX ORDER — ACETAMINOPHEN 500 MG
1000 TABLET ORAL ONCE
Refills: 0 | Status: COMPLETED | OUTPATIENT
Start: 2023-03-19 | End: 2023-03-19

## 2023-03-19 RX ORDER — KETOROLAC TROMETHAMINE 30 MG/ML
30 SYRINGE (ML) INJECTION ONCE
Refills: 0 | Status: DISCONTINUED | OUTPATIENT
Start: 2023-03-19 | End: 2023-03-19

## 2023-03-19 RX ADMIN — ONDANSETRON 4 MILLIGRAM(S): 8 TABLET, FILM COATED ORAL at 18:00

## 2023-03-19 RX ADMIN — ONDANSETRON 4 MILLIGRAM(S): 8 TABLET, FILM COATED ORAL at 22:03

## 2023-03-19 RX ADMIN — SODIUM CHLORIDE 1000 MILLILITER(S): 9 INJECTION, SOLUTION INTRAVENOUS at 22:03

## 2023-03-19 RX ADMIN — Medication 25 MILLIGRAM(S): at 23:37

## 2023-03-19 RX ADMIN — HYDROMORPHONE HYDROCHLORIDE 1 MILLIGRAM(S): 2 INJECTION INTRAMUSCULAR; INTRAVENOUS; SUBCUTANEOUS at 20:36

## 2023-03-19 RX ADMIN — MORPHINE SULFATE 8 MILLIGRAM(S): 50 CAPSULE, EXTENDED RELEASE ORAL at 18:27

## 2023-03-19 RX ADMIN — Medication 400 MILLIGRAM(S): at 18:27

## 2023-03-19 RX ADMIN — HYDROMORPHONE HYDROCHLORIDE 1 MILLIGRAM(S): 2 INJECTION INTRAMUSCULAR; INTRAVENOUS; SUBCUTANEOUS at 19:07

## 2023-03-19 RX ADMIN — SODIUM CHLORIDE 1000 MILLILITER(S): 9 INJECTION INTRAMUSCULAR; INTRAVENOUS; SUBCUTANEOUS at 23:40

## 2023-03-19 RX ADMIN — SODIUM CHLORIDE 1000 MILLILITER(S): 9 INJECTION INTRAMUSCULAR; INTRAVENOUS; SUBCUTANEOUS at 18:00

## 2023-03-19 RX ADMIN — Medication 30 MILLIGRAM(S): at 18:59

## 2023-03-19 RX ADMIN — Medication 10 MILLIGRAM(S): at 23:38

## 2023-03-19 NOTE — ED PROVIDER NOTE - SHIFT CHANGE DETAILS
Patient signed out pending CT, pain control, urology consultation, all further work up and management at the discretion of receiving physician.

## 2023-03-19 NOTE — ED ADULT NURSE REASSESSMENT NOTE - NS ED NURSE REASSESS COMMENT FT1
Pt ambulated with steady gait to br, while ambulating back to room, pt endorsed sharp R flank pain while urinating. Pt also endorsing nausea. MD Armijo made aware and will be at bedside shortly. NAD.

## 2023-03-19 NOTE — ED PROVIDER NOTE - NS ED ROS FT
Const: Denies fever, chills  HEENT: Denies blurry vision, sore throat  Neck: Denies neck pain/stiffness  Resp: Denies coughing, SOB  Cardiovascular: Denies CP, palpitations, LE edema  GI: + nausea, + vomiting, + abdominal pain. Denies diarrhea, constipation, blood in stool  : + Right flank pain. Denies urinary frequency/urgency/dysuria, hematuria  MSK: Denies back pain  Neuro: Denies HA, dizziness, numbness, weakness  Skin: Denies rashes.

## 2023-03-19 NOTE — ED PROVIDER NOTE - OBJECTIVE STATEMENT
31 y/o M with PMH DM2, morbid obesity, asthma, kidney stones presents for sudden onset right flank pain s/p a PCNO by Dr. Payton and removal of stent 2 days ago. He had no pain immediately following the procedure and 33 y/o M with PMH DM2, morbid obesity, asthma, kidney stones presents for sudden onset right flank pain s/p a PCNO by Dr. Payton and removal of stent 2 days ago. He had no pain immediately following the procedure and was discharged without pain medication. Today around 1400 after his father changed his dressing change, he got severe pain that started in his right flank, radiating to his groin, and is now in his lower right abdomen. Denies fevers, hematuria. He did vomit. Pain feels similar to when he has passed stones in the past. He spoke with Dr. Payton who recommended coming to the ED for evaluation. He is on antibiotics following the procedure.

## 2023-03-19 NOTE — ED PROVIDER NOTE - NSFOLLOWUPINSTRUCTIONS_ED_ALL_ED_FT
- Follow up with urology, Dr. Payton  - Take oxycodone as needed for pain.  - Please follow-up with your primary care doctor.  Please call for an appointment in the next 1-3 days but if you cannot follow-up with your primary care doctor please return to the ED for any urgent issues.  - You were given a copy of the tests performed today.  Please bring the results with you and review them with your primary care doctor.  - If you have any worsening of symptoms or any other concerns please return to the ED immediately.  - Please continue taking your home medications as directed.

## 2023-03-19 NOTE — CONSULT NOTE ADULT - SUBJECTIVE AND OBJECTIVE BOX
Subjective: 33 yo male with h/o multiple renal calculi, DM, obesity presents to Ed with right flank pain.  Pt had 1st stage R PCNL 3/14/23 and second look R PCNL 3/16.  A portion of the remaining stone was removed on 3/16  but due to the location of the stone, access and some bleeding, the remainder of the procedure was aborted. Patient was hemodynamically stable and was dc'd to home with PO abx on 3/17. Patient began having severe right flank pain again today and was told to go to ER by Dr. Payton.       MEDICATIONS  (STANDING):  acetaminophen   IVPB .. 1000 milliGRAM(s) IV Intermittent Once  morphine  - Injectable 8 milliGRAM(s) IV Push Once  sodium chloride 0.9% Bolus 1000 milliLiter(s) IV Bolus once    MEDICATIONS  (PRN):      Vital Signs Last 24 Hrs  T(C): 36.7 (19 Mar 2023 16:30), Max: 36.7 (19 Mar 2023 16:30)  T(F): 98 (19 Mar 2023 16:30), Max: 98 (19 Mar 2023 16:30)  HR: 90 (19 Mar 2023 16:30) (90 - 90)  BP: 204/125 (19 Mar 2023 16:30) (204/125 - 204/125)  BP(mean): --  RR: 18 (19 Mar 2023 16:30) (18 - 18)  SpO2: 98% (19 Mar 2023 16:30) (98% - 98%)    Parameters below as of 19 Mar 2023 16:30  Patient On (Oxygen Delivery Method): room air        Physical Exam:    Constitutional: NAD  HEENT: PERRL, EOMI  Respiratory: Respirations non-labored, no accessory muscle use  Gastrointestinal: Soft, non-tender, non-distended  Neurological: A&O x 3  : right flank tender, though patietn states palpating does not increase the pain      LABS:          A:      Subjective: 31 yo male with h/o multiple renal calculi, DM, obesity presents to Ed with right flank pain.  Pt had 1st stage R PCNL 3/14/23 and second look R PCNL 3/16.  A portion of the remaining stone was removed on 3/16  but due to the location of the stone, access and some bleeding, the remainder of the procedure was aborted. Patient was hemodynamically stable and was dc'd to home with PO abx on 3/17. Patient began having severe right flank pain again today and was told to go to ER by Dr. Payton.       MEDICATIONS  (STANDING):  acetaminophen   IVPB .. 1000 milliGRAM(s) IV Intermittent Once  morphine  - Injectable 8 milliGRAM(s) IV Push Once  sodium chloride 0.9% Bolus 1000 milliLiter(s) IV Bolus once    MEDICATIONS  (PRN):      Vital Signs Last 24 Hrs  T(C): 36.7 (19 Mar 2023 16:30), Max: 36.7 (19 Mar 2023 16:30)  T(F): 98 (19 Mar 2023 16:30), Max: 98 (19 Mar 2023 16:30)  HR: 90 (19 Mar 2023 16:30) (90 - 90)  BP: 204/125 (19 Mar 2023 16:30) (204/125 - 204/125)  BP(mean): --  RR: 18 (19 Mar 2023 16:30) (18 - 18)  SpO2: 98% (19 Mar 2023 16:30) (98% - 98%)    Parameters below as of 19 Mar 2023 16:30  Patient On (Oxygen Delivery Method): room air        Physical Exam:    Constitutional: NAD  HEENT: PERRL, EOMI  Respiratory: Respirations non-labored, no accessory muscle use  Gastrointestinal: Soft, non-tender, non-distended  Neurological: A&O x 3  : right flank tender, though patietn states palpating does not increase the pain      LABS:          A: 31 yo male with right flank pain. s/p R PCNL 3/14/23 and second look R PCNL 3/16.     P:  Labs  UA  CT  pain control  Discussed with Dr. Payton

## 2023-03-19 NOTE — ED PROVIDER NOTE - PROGRESS NOTE DETAILS
MD Aleksander: Received patient on sign out. Pain improved. Nausea resolved after reglan. Passed PO challenge. CT with normal post surgical changes, hydronephrosis likely due to blood clots. Cleared by urology. Navarro kinney. Patient received in signout from Dr. Carlson pending CT, Urology consultation, and re-evaluation. Patient with adequate pain control but episodes of nausea and vomiting. Patient believes it may have been related to pain medication. Urology consult is as noted. After hydration patient indicated that he feels better and is comfortable with discharge home.

## 2023-03-19 NOTE — ED PROVIDER NOTE - CLINICAL SUMMARY MEDICAL DECISION MAKING FREE TEXT BOX
31 y/o morbidly obese M presents for sudden onset of right flank pain radiating to right groin following surgical urology procedure 2 days ago with one episode of vomiting. Currently well appearing, + elevated blood pressure likely 2/2 pain, surgical wound appears clean and dry without signs of infection. Will check labs, UA, CT A/P, pain/nausea control, fluids and urology consult.

## 2023-03-19 NOTE — ED STATDOCS - PROGRESS NOTE DETAILS
Ishan HASSAN for ED attending, Dr. Braxton. 31 y/o male with pmhx of multiple kidney stone, and 2 recent PCNO a few days ago by Dr. Payton done here, c/o right flank pain started at 1:30pm today. Spoke to Dr. Payton  and urology PA will come to done to see pt. Will send to main.

## 2023-03-19 NOTE — ED ADULT NURSE REASSESSMENT NOTE - NS ED NURSE REASSESS COMMENT FT1
Report given to monique Frausto. Pt brought to [facility] at this time. Respirations even & unlabored. NAD. Pt and father made aware of plan of care and verbalized understanding.

## 2023-03-19 NOTE — ED STATDOCS - NS_ ATTENDINGSCRIBEDETAILS _ED_A_ED_FT
I, David Braxton, performed the initial face to face bedside interview with this patient regarding history of present illness, review of symptoms and relevant past medical, social and family history.  I completed an independent physical examination.  I was the initial provider who evaluated this patient. I have signed out the follow up of any pending tests (i.e. labs, radiological studies) to the ACP.  I have communicated the patient’s plan of care and disposition with the ACP.  The history, relevant review of systems, past medical and surgical history, medical decision making, and physical examination was documented by the scribe in my presence and I attest to the accuracy of the documentation.

## 2023-03-19 NOTE — ED PROVIDER NOTE - COVID-19 RESULT DATE/TIME
· Patient with a history of opioid use disorder, was recently discharged from detox on 12/5/2022  He was discharged on Suboxone  Patient never followed up with appointments and he weaned himself off of Suboxone  · Patient's last use of opioids was on 1/18/2023 around 1 AM, he used snorted fentanyl and wife had to administer Narcan  EMS had been called and patient had woken up and refused to go with them  He reportedly used cocaine and then took himself to the ER at [facility]   · Patient was being admitted to the detox unit on 1/18 with reports that he wanted to restart on Suboxone  Shortly after admission to the unit he was noted to be somnolent requiring Narcan  He was also on 5 L nasal cannula and started to desat, eventually requiring intubation and transfer to [facility]      Plan  · Buprenorphine patch removed  · Wean precedex to RASS of 0  · PRN Benzos, 0 doses since ICU admission  · Q4H neuros, delirium precautions, CAM ICU when appropriate   · Restart suboxone once TME resolved and extubated 14-Mar-2023 07:48

## 2023-03-19 NOTE — ED PROVIDER NOTE - PATIENT PORTAL LINK FT
You can access the FollowMyHealth Patient Portal offered by [facility] by registering at the following website: [email or web address]. By joining [employer]’s FollowMyHealth portal, you will also be able to view your health information using other applications (apps) compatible with our system.

## 2023-03-19 NOTE — ED ADULT NURSE REASSESSMENT NOTE - NS ED NURSE REASSESS COMMENT FT1
Pt has minimal relief from pain meds. Endorses stabbing pain to R flank. MD Armijo made aware. Meds to be given as per MD order. Respirations even & unlabored. NAD. VSS. Pending CT. Pt made aware of plan of care and verbalized understanding.

## 2023-03-19 NOTE — ED PROVIDER NOTE - PHYSICAL EXAMINATION
Const: Awake, alert and oriented. In no acute distress. Well appearing. Elevated BMI.  HEENT: NC/AT. Moist mucous membranes.  Eyes: No scleral icterus. EOMI.  Neck:. Soft and supple. Full ROM without pain.  Cardiac: Regular rate and regular rhythm. +S1/S2. No murmurs. Peripheral pulses 2+ and symmetric. No LE edema.  Resp: Speaking in full sentences. No evidence of respiratory distress. No wheezes, rales or rhonchi.  Abd: Soft, non-tender, non-distended. Normal bowel sounds in all 4 quadrants. No guarding or rebound.  Back: Spine midline and non-tender. + right CVAT.  Skin: Well appearing surgical wounds with C/D/I dressing on right lower flank. Minimal serosanguinous discharge. No rashes, abrasions or lacerations.  Neuro: Awake, alert & oriented x 3. Moves all extremities symmetrically.

## 2023-03-19 NOTE — ED ADULT NURSE NOTE - OBJECTIVE STATEMENT
Pt received A&Ox4 c/o 10/10 R flank pain associated with NV. Hx of kidney stones. Pt states pain was sudden onset 1400. Pt states had lithotripsy on 3/16 and had partial stone removed. Pt denies any hematauria, dysuria, fevers, chills. Respirations even & unlabored. NAD. Pt able to void and give urine sample with no difficulty. Pt made aware of plan of care and verbalized understanding.

## 2023-03-19 NOTE — ED ADULT NURSE REASSESSMENT NOTE - NS ED NURSE REASSESS COMMENT FT1
assumed care of pt from RN EL, pt AAOX3, resp. even and unlabored on RA, pt c/o right sided flank pain after kidney procedure several days ago, pt was sent by surgeon to be checked out, pt c/o nausea/vomiting, meds administered per MD order, pt given gingerale and crackers to PO challenge

## 2023-03-20 LAB
CULTURE RESULTS: SIGNIFICANT CHANGE UP
SPECIMEN SOURCE: SIGNIFICANT CHANGE UP

## 2023-03-20 PROCEDURE — 86900 BLOOD TYPING SEROLOGIC ABO: CPT

## 2023-03-20 PROCEDURE — 99284 EMERGENCY DEPT VISIT MOD MDM: CPT | Mod: 25

## 2023-03-20 PROCEDURE — 81001 URINALYSIS AUTO W/SCOPE: CPT

## 2023-03-20 PROCEDURE — 36415 COLL VENOUS BLD VENIPUNCTURE: CPT

## 2023-03-20 PROCEDURE — 96374 THER/PROPH/DIAG INJ IV PUSH: CPT | Mod: XU

## 2023-03-20 PROCEDURE — 86901 BLOOD TYPING SEROLOGIC RH(D): CPT

## 2023-03-20 PROCEDURE — 96376 TX/PRO/DX INJ SAME DRUG ADON: CPT

## 2023-03-20 PROCEDURE — 96375 TX/PRO/DX INJ NEW DRUG ADDON: CPT

## 2023-03-20 PROCEDURE — 80053 COMPREHEN METABOLIC PANEL: CPT

## 2023-03-20 PROCEDURE — 86850 RBC ANTIBODY SCREEN: CPT

## 2023-03-20 PROCEDURE — 85025 COMPLETE CBC W/AUTO DIFF WBC: CPT

## 2023-03-20 PROCEDURE — 87086 URINE CULTURE/COLONY COUNT: CPT

## 2023-03-20 PROCEDURE — 74177 CT ABD & PELVIS W/CONTRAST: CPT | Mod: MA

## 2023-03-20 RX ORDER — ONDANSETRON 8 MG/1
1 TABLET, FILM COATED ORAL
Qty: 9 | Refills: 0
Start: 2023-03-20 | End: 2023-03-22

## 2023-03-20 RX ORDER — ONDANSETRON 8 MG/1
4 TABLET, FILM COATED ORAL ONCE
Refills: 0 | Status: COMPLETED | OUTPATIENT
Start: 2023-03-20 | End: 2023-03-20

## 2023-03-20 RX ADMIN — ONDANSETRON 4 MILLIGRAM(S): 8 TABLET, FILM COATED ORAL at 00:36

## 2023-03-21 ENCOUNTER — APPOINTMENT (OUTPATIENT)
Dept: UROLOGY | Facility: CLINIC | Age: 33
End: 2023-03-21
Payer: COMMERCIAL

## 2023-03-21 VITALS
HEART RATE: 108 BPM | DIASTOLIC BLOOD PRESSURE: 77 MMHG | RESPIRATION RATE: 18 BRPM | SYSTOLIC BLOOD PRESSURE: 127 MMHG | OXYGEN SATURATION: 93 %

## 2023-03-21 PROCEDURE — 99024 POSTOP FOLLOW-UP VISIT: CPT

## 2023-03-21 NOTE — HISTORY OF PRESENT ILLNESS
[FreeTextEntry1] : Pt c/o leakage from old nephrostomy site since last night, states a lot up until this morning after he showered. Accompanied by father. No pain around site, fever, chills.

## 2023-03-22 ENCOUNTER — APPOINTMENT (OUTPATIENT)
Dept: UROLOGY | Facility: CLINIC | Age: 33
End: 2023-03-22
Payer: COMMERCIAL

## 2023-03-22 PROCEDURE — 99024 POSTOP FOLLOW-UP VISIT: CPT

## 2023-03-24 ENCOUNTER — APPOINTMENT (OUTPATIENT)
Dept: UROLOGY | Facility: CLINIC | Age: 33
End: 2023-03-24
Payer: COMMERCIAL

## 2023-03-24 ENCOUNTER — APPOINTMENT (OUTPATIENT)
Dept: UROLOGY | Facility: CLINIC | Age: 33
End: 2023-03-24

## 2023-03-24 PROCEDURE — 99024 POSTOP FOLLOW-UP VISIT: CPT

## 2023-03-24 NOTE — HISTORY OF PRESENT ILLNESS
[FreeTextEntry1] : \par leakage stopped\par passed small clots\par urine clear\par feeling good\par bag removed\par small dressing on back\par \par next visit in 1 month with 24 hr urine\par will need future surgery, will try to dissolve the stone first

## 2023-03-24 NOTE — HISTORY OF PRESENT ILLNESS
[FreeTextEntry1] : \par s/p two stage PCNL\par difficult procedure due to anatomy and BMI\par could not reach some stone in the lower calyx\par stent was completely instructed, and was removed in two pieces\par \par here still with some leakage through the flank\par passed some clots\par CT in the ER with no ureteral stones\par discussed with patient\par will continue follow up for now and wait for the clots to dissolve\par \par

## 2023-03-25 ENCOUNTER — EMERGENCY (EMERGENCY)
Facility: HOSPITAL | Age: 33
LOS: 1 days | Discharge: HOME | End: 2023-03-25
Attending: EMERGENCY MEDICINE
Payer: COMMERCIAL

## 2023-03-25 VITALS
HEIGHT: 67 IN | RESPIRATION RATE: 20 BRPM | HEART RATE: 99 BPM | DIASTOLIC BLOOD PRESSURE: 94 MMHG | OXYGEN SATURATION: 96 % | TEMPERATURE: 98 F | SYSTOLIC BLOOD PRESSURE: 145 MMHG

## 2023-03-25 DIAGNOSIS — Z90.49 ACQUIRED ABSENCE OF OTHER SPECIFIED PARTS OF DIGESTIVE TRACT: Chronic | ICD-10-CM

## 2023-03-25 DIAGNOSIS — Z96.0 PRESENCE OF UROGENITAL IMPLANTS: Chronic | ICD-10-CM

## 2023-03-25 DIAGNOSIS — Z87.81 PERSONAL HISTORY OF (HEALED) TRAUMATIC FRACTURE: Chronic | ICD-10-CM

## 2023-03-25 PROCEDURE — 74176 CT ABD & PELVIS W/O CONTRAST: CPT | Mod: 26

## 2023-03-25 PROCEDURE — 99285 EMERGENCY DEPT VISIT HI MDM: CPT

## 2023-03-25 RX ORDER — KETOROLAC TROMETHAMINE 30 MG/ML
30 SYRINGE (ML) INJECTION ONCE
Refills: 0 | Status: DISCONTINUED | OUTPATIENT
Start: 2023-03-25 | End: 2023-03-25

## 2023-03-25 RX ORDER — HYDROMORPHONE HYDROCHLORIDE 2 MG/ML
1 INJECTION INTRAMUSCULAR; INTRAVENOUS; SUBCUTANEOUS ONCE
Refills: 0 | Status: DISCONTINUED | OUTPATIENT
Start: 2023-03-25 | End: 2023-03-25

## 2023-03-25 RX ORDER — ONDANSETRON 8 MG/1
1 TABLET, FILM COATED ORAL
Qty: 20 | Refills: 0
Start: 2023-03-25

## 2023-03-25 RX ORDER — METOCLOPRAMIDE HCL 10 MG
10 TABLET ORAL ONCE
Refills: 0 | Status: COMPLETED | OUTPATIENT
Start: 2023-03-25 | End: 2023-03-25

## 2023-03-25 RX ADMIN — Medication 10 MILLIGRAM(S): at 15:18

## 2023-03-25 RX ADMIN — HYDROMORPHONE HYDROCHLORIDE 1 MILLIGRAM(S): 2 INJECTION INTRAMUSCULAR; INTRAVENOUS; SUBCUTANEOUS at 17:17

## 2023-03-25 RX ADMIN — Medication 30 MILLIGRAM(S): at 15:23

## 2023-03-25 NOTE — CONSULT NOTE ADULT - SUBJECTIVE AND OBJECTIVE BOX
Patient is a 31 y/o male who presents to [facility] complaining of acute onset left flank pain. Pt states pain woke him up from sleep at approx 0500 this morning. States initially it was localized to L mid back and has now traveled down to left flank. Reports pain is severe and describes it as a sharp stabbing sensation. No aggravating or alleviating factors. Associated w/ multiple episodes of bilious emesis at home. Took Zofran at home with little relief. No episodes of emesis since arriving in the ED. Denies fever or chills. Reports a small amount of bright red blood in urine when be voided this morning and states that urine has since "cleared up." Also reports that he passed a small stone since arriving in the ED that was collected and sent for anaylsis, however no relief of pain or nausea after having passed it.    Patient is known to Dr. Payton - managed for R renal stones. S/p R PCNL with lithotripsy on 3/14 and 3/16. Still w/ known R renal stone that was unable to be treated during 2nd look PCNL. Had ED visit on 3/19 for renal colic. CT on 3/19 showed nonobstructing b/l renal calculi, 8mm on R lower pole, 4mm on L lower pole. Was discharged home from ED at that time. Seen yesterday 3/24 in outpatient office as advised to follow-up in 1 month as pt still needs definitive treatment of R renal stone.       PAST MEDICAL & SURGICAL HISTORY:  Kidney stones  DM2 (diabetes mellitus, type 2)  Asthma  Sleep apnea, obstructive  Obesity, morbid  COVID-19 vaccine series completed  Calculus of kidney  Seasonal allergies  S/P cholecystectomy -  Ureteral stent  History of broken nose      Constitutional: patient seems comfortable sitting up in chair, in no apparent distress, non-toxic appearing  Respiratory: respirations are unlabored, no accessory muscle use, no conversational dyspnea  Cardiovascular: regular rate & rhythm  Gastrointestinal: abdomen obese, soft, non-tender, non-distended, no rebound tenderness / guarding  : + TTP of left anterolateral flank. Dressing to R flank over prior PCNL site is clean and dry. No R CVA ttp. No suprapubic distension or tenderness  Neurological: A&O x 3  Skin: warm, mucous membranes moist, no diaphoresis, pallor, cyanosis or jaundice      LABS:                        9.2    9.47  )-----------( 264      ( 25 Mar 2023 14:00 )             28.7     03-25    134<L>  |  98  |  13.8  ----------------------------<  235<H>  4.5   |  23.0  |  0.80    Ca    8.9      25 Mar 2023 14:00        Urinalysis Basic - ( 25 Mar 2023 14:00 )    Color: Yellow / Appearance: Clear / S.015 / pH: x  Gluc: x / Ketone: Negative  / Bili: Negative / Urobili: Negative mg/dL   Blood: x / Protein: Negative / Nitrite: Negative   Leuk Esterase: Trace / RBC: 25-50 /HPF / WBC 0-2 /HPF   Sq Epi: x / Non Sq Epi: Occasional / Bacteria: Occasional

## 2023-03-25 NOTE — CONSULT NOTE ADULT - ASSESSMENT
31 y/o male with known b/l renal calculi. S/p R PCNL x2 earlier this month. Now presenting w/ acute onset L flank pain. Associated w/ nausea and emesis. Afebrile. Last CT on 3/19 showed 4mm nonobstructing L renal calculus in L lower pole  - CT renal stone hunt pending  - Continue supportive measures w/ IVF, anti-emetics & analgesia  - Will f/u CT results and re-evaluate patient after symptomatic treatment to determine disposition  - Discussed w/ Dr. Terry 31 y/o male with known b/l renal calculi. S/p R PCNL x2 earlier this month. Now presenting w/ acute onset L flank pain. Associated w/ nausea and emesis. Afebrile. Last CT on 3/19 showed 4mm nonobstructing L renal calculus in L lower pole  - CT renal stone hunt pending  - Continue supportive measures w/ IVF, anti-emetics & analgesia  - Will f/u CT results and re-evaluate patient after symptomatic treatment to determine disposition  - Discussed w/ Dr. Terry      ADDENDUM 1759: CT renal stone hunt reviewed - showed that left renal 0.5 cm stone had migrated distally, now in ureteropelvic junction with minimal hydronephrosis. I went to re-evaluate patient however he had since been discharged by ED. Per ED MD Dr. Miles, patient had resolution of pain and nausea after receiving medication. She instructed him to follow-up with Dr. Payton after discharge. Discussed with Dr. Terry

## 2023-03-26 PROCEDURE — 82365 CALCULUS SPECTROSCOPY: CPT

## 2023-03-26 PROCEDURE — 99284 EMERGENCY DEPT VISIT MOD MDM: CPT | Mod: 25

## 2023-03-26 PROCEDURE — 80048 BASIC METABOLIC PNL TOTAL CA: CPT

## 2023-03-26 PROCEDURE — 85027 COMPLETE CBC AUTOMATED: CPT

## 2023-03-26 PROCEDURE — 74176 CT ABD & PELVIS W/O CONTRAST: CPT

## 2023-03-26 PROCEDURE — 96375 TX/PRO/DX INJ NEW DRUG ADDON: CPT

## 2023-03-26 PROCEDURE — 81001 URINALYSIS AUTO W/SCOPE: CPT

## 2023-03-26 PROCEDURE — 36415 COLL VENOUS BLD VENIPUNCTURE: CPT

## 2023-03-26 PROCEDURE — 96374 THER/PROPH/DIAG INJ IV PUSH: CPT

## 2023-03-27 ENCOUNTER — NON-APPOINTMENT (OUTPATIENT)
Age: 33
End: 2023-03-27

## 2023-03-31 ENCOUNTER — APPOINTMENT (OUTPATIENT)
Dept: UROLOGY | Facility: CLINIC | Age: 33
End: 2023-03-31

## 2023-04-15 ENCOUNTER — TRANSCRIPTION ENCOUNTER (OUTPATIENT)
Age: 33
End: 2023-04-15

## 2023-04-15 ENCOUNTER — INPATIENT (INPATIENT)
Facility: HOSPITAL | Age: 33
LOS: 0 days | Discharge: HOME | DRG: 694 | End: 2023-04-16
Attending: UROLOGY | Admitting: UROLOGY
Payer: COMMERCIAL

## 2023-04-15 VITALS
RESPIRATION RATE: 20 BRPM | HEART RATE: 98 BPM | DIASTOLIC BLOOD PRESSURE: 83 MMHG | HEIGHT: 67 IN | SYSTOLIC BLOOD PRESSURE: 138 MMHG | OXYGEN SATURATION: 96 % | TEMPERATURE: 98 F | WEIGHT: 315 LBS

## 2023-04-15 DIAGNOSIS — Z96.0 PRESENCE OF UROGENITAL IMPLANTS: Chronic | ICD-10-CM

## 2023-04-15 DIAGNOSIS — Z98.890 OTHER SPECIFIED POSTPROCEDURAL STATES: Chronic | ICD-10-CM

## 2023-04-15 DIAGNOSIS — N20.0 CALCULUS OF KIDNEY: ICD-10-CM

## 2023-04-15 DIAGNOSIS — Z90.49 ACQUIRED ABSENCE OF OTHER SPECIFIED PARTS OF DIGESTIVE TRACT: Chronic | ICD-10-CM

## 2023-04-15 DIAGNOSIS — Z87.81 PERSONAL HISTORY OF (HEALED) TRAUMATIC FRACTURE: Chronic | ICD-10-CM

## 2023-04-15 LAB
ALBUMIN SERPL ELPH-MCNC: 4.2 G/DL — SIGNIFICANT CHANGE UP (ref 3.3–5.2)
ALP SERPL-CCNC: 121 U/L — HIGH (ref 40–120)
ALT FLD-CCNC: 50 U/L — HIGH
ANION GAP SERPL CALC-SCNC: 18 MMOL/L — HIGH (ref 5–17)
APPEARANCE UR: CLEAR — SIGNIFICANT CHANGE UP
AST SERPL-CCNC: 48 U/L — HIGH
BASE EXCESS BLDV CALC-SCNC: -0.4 MMOL/L — SIGNIFICANT CHANGE UP (ref -2–3)
BASOPHILS # BLD AUTO: 0.05 K/UL — SIGNIFICANT CHANGE UP (ref 0–0.2)
BASOPHILS NFR BLD AUTO: 0.7 % — SIGNIFICANT CHANGE UP (ref 0–2)
BILIRUB SERPL-MCNC: 0.3 MG/DL — LOW (ref 0.4–2)
BILIRUB UR-MCNC: NEGATIVE — SIGNIFICANT CHANGE UP
BUN SERPL-MCNC: 15.6 MG/DL — SIGNIFICANT CHANGE UP (ref 8–20)
CA-I SERPL-SCNC: 1.08 MMOL/L — LOW (ref 1.15–1.33)
CALCIUM SERPL-MCNC: 9.5 MG/DL — SIGNIFICANT CHANGE UP (ref 8.4–10.5)
CHLORIDE BLDV-SCNC: 104 MMOL/L — SIGNIFICANT CHANGE UP (ref 96–108)
CHLORIDE SERPL-SCNC: 98 MMOL/L — SIGNIFICANT CHANGE UP (ref 96–108)
CO2 SERPL-SCNC: 20 MMOL/L — LOW (ref 22–29)
COLOR SPEC: YELLOW — SIGNIFICANT CHANGE UP
CREAT SERPL-MCNC: 0.77 MG/DL — SIGNIFICANT CHANGE UP (ref 0.5–1.3)
DIFF PNL FLD: ABNORMAL
EGFR: 121 ML/MIN/1.73M2 — SIGNIFICANT CHANGE UP
EOSINOPHIL # BLD AUTO: 0.13 K/UL — SIGNIFICANT CHANGE UP (ref 0–0.5)
EOSINOPHIL NFR BLD AUTO: 1.9 % — SIGNIFICANT CHANGE UP (ref 0–6)
EPI CELLS # UR: SIGNIFICANT CHANGE UP
GAS PNL BLDV: 136 MMOL/L — SIGNIFICANT CHANGE UP (ref 136–145)
GAS PNL BLDV: SIGNIFICANT CHANGE UP
GLUCOSE BLDC GLUCOMTR-MCNC: 171 MG/DL — HIGH (ref 70–99)
GLUCOSE BLDC GLUCOMTR-MCNC: 184 MG/DL — HIGH (ref 70–99)
GLUCOSE BLDV-MCNC: 183 MG/DL — HIGH (ref 70–99)
GLUCOSE SERPL-MCNC: 176 MG/DL — HIGH (ref 70–99)
GLUCOSE SERPL-MCNC: 258 MG/DL — HIGH (ref 70–99)
GLUCOSE UR QL: 100 MG/DL
HCO3 BLDV-SCNC: 25 MMOL/L — SIGNIFICANT CHANGE UP (ref 22–29)
HCT VFR BLD CALC: 38.8 % — LOW (ref 39–50)
HCT VFR BLDA CALC: 37 % — SIGNIFICANT CHANGE UP
HGB BLD CALC-MCNC: 12.3 G/DL — LOW (ref 12.6–17.4)
HGB BLD-MCNC: 12.6 G/DL — LOW (ref 13–17)
IMM GRANULOCYTES NFR BLD AUTO: 0.4 % — SIGNIFICANT CHANGE UP (ref 0–0.9)
KETONES UR-MCNC: NEGATIVE — SIGNIFICANT CHANGE UP
LACTATE BLDV-MCNC: 2.8 MMOL/L — HIGH (ref 0.5–2)
LACTATE BLDV-MCNC: 3.4 MMOL/L — HIGH (ref 0.5–2)
LEUKOCYTE ESTERASE UR-ACNC: ABNORMAL
LIDOCAIN IGE QN: 37 U/L — SIGNIFICANT CHANGE UP (ref 22–51)
LYMPHOCYTES # BLD AUTO: 1.62 K/UL — SIGNIFICANT CHANGE UP (ref 1–3.3)
LYMPHOCYTES # BLD AUTO: 24.2 % — SIGNIFICANT CHANGE UP (ref 13–44)
MCHC RBC-ENTMCNC: 26.9 PG — LOW (ref 27–34)
MCHC RBC-ENTMCNC: 32.5 GM/DL — SIGNIFICANT CHANGE UP (ref 32–36)
MCV RBC AUTO: 82.7 FL — SIGNIFICANT CHANGE UP (ref 80–100)
MONOCYTES # BLD AUTO: 0.47 K/UL — SIGNIFICANT CHANGE UP (ref 0–0.9)
MONOCYTES NFR BLD AUTO: 7 % — SIGNIFICANT CHANGE UP (ref 2–14)
NEUTROPHILS # BLD AUTO: 4.39 K/UL — SIGNIFICANT CHANGE UP (ref 1.8–7.4)
NEUTROPHILS NFR BLD AUTO: 65.8 % — SIGNIFICANT CHANGE UP (ref 43–77)
NITRITE UR-MCNC: NEGATIVE — SIGNIFICANT CHANGE UP
PCO2 BLDV: 47 MMHG — SIGNIFICANT CHANGE UP (ref 42–55)
PH BLDV: 7.34 — SIGNIFICANT CHANGE UP (ref 7.32–7.43)
PH UR: 5 — SIGNIFICANT CHANGE UP (ref 5–8)
PLATELET # BLD AUTO: 267 K/UL — SIGNIFICANT CHANGE UP (ref 150–400)
PO2 BLDV: 72 MMHG — HIGH (ref 25–45)
POTASSIUM BLDV-SCNC: 4.5 MMOL/L — SIGNIFICANT CHANGE UP (ref 3.5–5.1)
POTASSIUM SERPL-MCNC: 4.8 MMOL/L — SIGNIFICANT CHANGE UP (ref 3.5–5.3)
POTASSIUM SERPL-SCNC: 4.8 MMOL/L — SIGNIFICANT CHANGE UP (ref 3.5–5.3)
PROT SERPL-MCNC: 7.4 G/DL — SIGNIFICANT CHANGE UP (ref 6.6–8.7)
PROT UR-MCNC: 30 MG/DL
RAPID RVP RESULT: SIGNIFICANT CHANGE UP
RBC # BLD: 4.69 M/UL — SIGNIFICANT CHANGE UP (ref 4.2–5.8)
RBC # FLD: 14.4 % — SIGNIFICANT CHANGE UP (ref 10.3–14.5)
RBC CASTS # UR COMP ASSIST: ABNORMAL /HPF (ref 0–4)
SAO2 % BLDV: 95.3 % — SIGNIFICANT CHANGE UP
SARS-COV-2 RNA SPEC QL NAA+PROBE: SIGNIFICANT CHANGE UP
SARS-COV-2 RNA SPEC QL NAA+PROBE: SIGNIFICANT CHANGE UP
SODIUM SERPL-SCNC: 135 MMOL/L — SIGNIFICANT CHANGE UP (ref 135–145)
SP GR SPEC: 1.01 — SIGNIFICANT CHANGE UP (ref 1.01–1.02)
URATE CRY FLD QL MICRO: ABNORMAL
UROBILINOGEN FLD QL: NEGATIVE MG/DL — SIGNIFICANT CHANGE UP
WBC # BLD: 6.69 K/UL — SIGNIFICANT CHANGE UP (ref 3.8–10.5)
WBC # FLD AUTO: 6.69 K/UL — SIGNIFICANT CHANGE UP (ref 3.8–10.5)
WBC UR QL: SIGNIFICANT CHANGE UP /HPF (ref 0–5)

## 2023-04-15 PROCEDURE — 99285 EMERGENCY DEPT VISIT HI MDM: CPT

## 2023-04-15 PROCEDURE — 74176 CT ABD & PELVIS W/O CONTRAST: CPT | Mod: 26,MA

## 2023-04-15 PROCEDURE — 99223 1ST HOSP IP/OBS HIGH 75: CPT | Mod: 24

## 2023-04-15 RX ORDER — METFORMIN HYDROCHLORIDE 850 MG/1
500 TABLET ORAL DAILY
Refills: 0 | Status: DISCONTINUED | OUTPATIENT
Start: 2023-04-15 | End: 2023-04-16

## 2023-04-15 RX ORDER — TAMSULOSIN HYDROCHLORIDE 0.4 MG/1
0.4 CAPSULE ORAL AT BEDTIME
Refills: 0 | Status: DISCONTINUED | OUTPATIENT
Start: 2023-04-15 | End: 2023-04-16

## 2023-04-15 RX ORDER — SODIUM CHLORIDE 9 MG/ML
1000 INJECTION, SOLUTION INTRAVENOUS
Refills: 0 | Status: DISCONTINUED | OUTPATIENT
Start: 2023-04-15 | End: 2023-04-16

## 2023-04-15 RX ORDER — KETOROLAC TROMETHAMINE 30 MG/ML
30 SYRINGE (ML) INJECTION ONCE
Refills: 0 | Status: DISCONTINUED | OUTPATIENT
Start: 2023-04-15 | End: 2023-04-15

## 2023-04-15 RX ORDER — INSULIN LISPRO 100/ML
VIAL (ML) SUBCUTANEOUS
Refills: 0 | Status: DISCONTINUED | OUTPATIENT
Start: 2023-04-15 | End: 2023-04-16

## 2023-04-15 RX ORDER — DEXTROSE 50 % IN WATER 50 %
12.5 SYRINGE (ML) INTRAVENOUS ONCE
Refills: 0 | Status: DISCONTINUED | OUTPATIENT
Start: 2023-04-15 | End: 2023-04-16

## 2023-04-15 RX ORDER — HYDROMORPHONE HYDROCHLORIDE 2 MG/ML
0.5 INJECTION INTRAMUSCULAR; INTRAVENOUS; SUBCUTANEOUS EVERY 6 HOURS
Refills: 0 | Status: DISCONTINUED | OUTPATIENT
Start: 2023-04-15 | End: 2023-04-16

## 2023-04-15 RX ORDER — LORATADINE 10 MG/1
10 TABLET ORAL DAILY
Refills: 0 | Status: DISCONTINUED | OUTPATIENT
Start: 2023-04-15 | End: 2023-04-16

## 2023-04-15 RX ORDER — KETOROLAC TROMETHAMINE 30 MG/ML
15 SYRINGE (ML) INJECTION EVERY 6 HOURS
Refills: 0 | Status: DISCONTINUED | OUTPATIENT
Start: 2023-04-15 | End: 2023-04-16

## 2023-04-15 RX ORDER — SODIUM CHLORIDE 9 MG/ML
1000 INJECTION INTRAMUSCULAR; INTRAVENOUS; SUBCUTANEOUS ONCE
Refills: 0 | Status: COMPLETED | OUTPATIENT
Start: 2023-04-15 | End: 2023-04-15

## 2023-04-15 RX ORDER — HEPARIN SODIUM 5000 [USP'U]/ML
5000 INJECTION INTRAVENOUS; SUBCUTANEOUS EVERY 12 HOURS
Refills: 0 | Status: DISCONTINUED | OUTPATIENT
Start: 2023-04-15 | End: 2023-04-16

## 2023-04-15 RX ORDER — DEXTROSE 50 % IN WATER 50 %
25 SYRINGE (ML) INTRAVENOUS ONCE
Refills: 0 | Status: DISCONTINUED | OUTPATIENT
Start: 2023-04-15 | End: 2023-04-16

## 2023-04-15 RX ORDER — ONDANSETRON 8 MG/1
4 TABLET, FILM COATED ORAL ONCE
Refills: 0 | Status: COMPLETED | OUTPATIENT
Start: 2023-04-15 | End: 2023-04-15

## 2023-04-15 RX ORDER — ALLOPURINOL 300 MG
100 TABLET ORAL DAILY
Refills: 0 | Status: DISCONTINUED | OUTPATIENT
Start: 2023-04-15 | End: 2023-04-16

## 2023-04-15 RX ORDER — ONDANSETRON 8 MG/1
4 TABLET, FILM COATED ORAL EVERY 6 HOURS
Refills: 0 | Status: DISCONTINUED | OUTPATIENT
Start: 2023-04-15 | End: 2023-04-16

## 2023-04-15 RX ORDER — ACETAMINOPHEN 500 MG
975 TABLET ORAL EVERY 6 HOURS
Refills: 0 | Status: DISCONTINUED | OUTPATIENT
Start: 2023-04-15 | End: 2023-04-16

## 2023-04-15 RX ORDER — METOCLOPRAMIDE HCL 10 MG
10 TABLET ORAL ONCE
Refills: 0 | Status: COMPLETED | OUTPATIENT
Start: 2023-04-15 | End: 2023-04-15

## 2023-04-15 RX ORDER — GLUCAGON INJECTION, SOLUTION 0.5 MG/.1ML
1 INJECTION, SOLUTION SUBCUTANEOUS ONCE
Refills: 0 | Status: DISCONTINUED | OUTPATIENT
Start: 2023-04-15 | End: 2023-04-16

## 2023-04-15 RX ORDER — HYDROMORPHONE HYDROCHLORIDE 2 MG/ML
1 INJECTION INTRAMUSCULAR; INTRAVENOUS; SUBCUTANEOUS ONCE
Refills: 0 | Status: DISCONTINUED | OUTPATIENT
Start: 2023-04-15 | End: 2023-04-15

## 2023-04-15 RX ORDER — DEXTROSE 50 % IN WATER 50 %
15 SYRINGE (ML) INTRAVENOUS ONCE
Refills: 0 | Status: DISCONTINUED | OUTPATIENT
Start: 2023-04-15 | End: 2023-04-16

## 2023-04-15 RX ORDER — MORPHINE SULFATE 50 MG/1
4 CAPSULE, EXTENDED RELEASE ORAL ONCE
Refills: 0 | Status: DISCONTINUED | OUTPATIENT
Start: 2023-04-15 | End: 2023-04-15

## 2023-04-15 RX ORDER — KETOROLAC TROMETHAMINE 30 MG/ML
30 SYRINGE (ML) INJECTION EVERY 6 HOURS
Refills: 0 | Status: DISCONTINUED | OUTPATIENT
Start: 2023-04-15 | End: 2023-04-16

## 2023-04-15 RX ADMIN — MORPHINE SULFATE 4 MILLIGRAM(S): 50 CAPSULE, EXTENDED RELEASE ORAL at 11:52

## 2023-04-15 RX ADMIN — SODIUM CHLORIDE 1000 MILLILITER(S): 9 INJECTION INTRAMUSCULAR; INTRAVENOUS; SUBCUTANEOUS at 15:00

## 2023-04-15 RX ADMIN — HYDROMORPHONE HYDROCHLORIDE 1 MILLIGRAM(S): 2 INJECTION INTRAMUSCULAR; INTRAVENOUS; SUBCUTANEOUS at 12:45

## 2023-04-15 RX ADMIN — HYDROMORPHONE HYDROCHLORIDE 0.5 MILLIGRAM(S): 2 INJECTION INTRAMUSCULAR; INTRAVENOUS; SUBCUTANEOUS at 20:43

## 2023-04-15 RX ADMIN — Medication 30 MILLIGRAM(S): at 14:43

## 2023-04-15 RX ADMIN — SODIUM CHLORIDE 125 MILLILITER(S): 9 INJECTION, SOLUTION INTRAVENOUS at 20:45

## 2023-04-15 RX ADMIN — ONDANSETRON 4 MILLIGRAM(S): 8 TABLET, FILM COATED ORAL at 23:24

## 2023-04-15 RX ADMIN — SODIUM CHLORIDE 1000 MILLILITER(S): 9 INJECTION INTRAMUSCULAR; INTRAVENOUS; SUBCUTANEOUS at 11:52

## 2023-04-15 RX ADMIN — HYDROMORPHONE HYDROCHLORIDE 0.5 MILLIGRAM(S): 2 INJECTION INTRAMUSCULAR; INTRAVENOUS; SUBCUTANEOUS at 22:20

## 2023-04-15 RX ADMIN — Medication 10 MILLIGRAM(S): at 12:45

## 2023-04-15 RX ADMIN — ONDANSETRON 4 MILLIGRAM(S): 8 TABLET, FILM COATED ORAL at 11:52

## 2023-04-15 RX ADMIN — SODIUM CHLORIDE 125 MILLILITER(S): 9 INJECTION, SOLUTION INTRAVENOUS at 23:13

## 2023-04-15 NOTE — PATIENT PROFILE ADULT - FALL HARM RISK - UNIVERSAL INTERVENTIONS
Bed in lowest position, wheels locked, appropriate side rails in place/Call bell, personal items and telephone in reach/Instruct patient to call for assistance before getting out of bed or chair/Non-slip footwear when patient is out of bed/[place] to call system/Physically safe environment - no spills, clutter or unnecessary equipment/Purposeful Proactive Rounding/Room/bathroom lighting operational, light cord in reach

## 2023-04-15 NOTE — H&P ADULT - NS ATTEND AMEND GEN_ALL_CORE FT
Reviewed the CT.   Patient will need a stent.  HU on the stone averaged about 436.     I have explained the risks, benefits and alternatives to melina.

## 2023-04-15 NOTE — H&P ADULT - HISTORY OF PRESENT ILLNESS
33 y/o male s/p Left PCNL lithotripsy 3/2023 now presents to ER with 2 days hematuria with right flank pain and discomfort. for evaluation.

## 2023-04-15 NOTE — ED STATDOCS - PROGRESS NOTE DETAILS
Pt with worsening pain after Morphine. Pt with right flank pain on re-evaluation . Hydromorphone  and Reglan ordered. Pt moved form intake Room. Pt seen and evaluated by intake Physician. HPI, Physical examination performed by intake Physician . Note reviewed and followup examination performed by me consistent with initial assessment. Agrees with intake Physician plan and tests. Pt admitted for Urologic intervention

## 2023-04-15 NOTE — ED ADULT NURSE REASSESSMENT NOTE - NS ED NURSE REASSESS COMMENT FT1
patient resting appearing more comfortable than originally feeling. 7/10  pain no acute distress at this time

## 2023-04-15 NOTE — H&P ADULT - NSHPPHYSICALEXAM_GEN_ALL_CORE
PE:  awake, alert, responsive no acute distress.  Obese male  HEENT:  unremarkable  Chest: good air exchange, clear, minimal wheezing right base  Abdomen: soft, obese healed surgical scar , n/t on palpation  : voiding hematuria 2 days. , nl genitalia  skin : warm dry no ulcers appreciated  Musculoskeletal:: moves upper and lower extremities full ROM, normal gait, good hand strength PE:  awake, alert, responsive no acute distress.  Obese male  HEENT:  unremarkable  Chest: good air exchange, clear, minimal wheezing right base  Abdomen: soft, obese healed surgical scar , n/t on palpation  : . , nl genitalia  skin : warm dry no ulcers appreciated  Musculoskeletal:: moves upper and lower extremities full ROM, normal gait, good hand strength  Neuro: no focal deficit

## 2023-04-15 NOTE — H&P ADULT - NSHPLABSRESULTS_GEN_ALL_CORE
Labs:                        12.6   6.69  )-----------( 267      ( 15 Apr 2023 11:11 )             38.8     04-15    135  |  98  |  15.6  ----------------------------<  258<H>  4.8   |  20.0<L>  |  0.77    Urinalysis + Microscopic Examination (04.15.23 @ 11:11)    pH Urine: 5.0    Urine Appearance: Clear    Color: Yellow    Specific Gravity: 1.015    Protein, Urine: 30 mg/dL    Glucose Qualitative, Urine: 100 mg/dL    Ketone - Urine: Negative    Blood, Urine: Moderate    Bilirubin: Negative    Urobilinogen: Negative mg/dL    Leukocyte Esterase Concentration: Trace    Nitrite: Negative    White Blood Cell - Urine: 0-2 /HPF    Red Blood Cell - Urine: 6-10 /HPF    Squamous Epithelial Cells: Occasional    Uric Acid Crystals: Few      CT Abdomen/Pelvis No Contrast ( 4/15/23 )     KIDNEYS/URETERS: Moderate right hydroureteronephrosis to the level of a 1   cm stone in the mid ureter. Associated perinephric and periureteral fat   stranding. A couple additional non-obstructing right lower pole stones   measuring up to 0.3 cm. Normal left kidney without hydronephrosis or   stone.    BLADDER: Within normal limits.  REPRODUCTIVE ORGANS: Normal size prostate.      IMPRESSION:  Right sided obstructive uropathy to the level of a 1 cm stone in the mid   ureter.    Hepatosplenomegaly and hepatic steatosis.

## 2023-04-15 NOTE — ED STATDOCS - CLINICAL SUMMARY MEDICAL DECISION MAKING FREE TEXT BOX
31 y/o male. History and physical as noted. concerned for recurring kidney stone/obstructed stone. Will obtain labs, CT, and consult urology 31 y/o male. History and physical as noted. concerned for recurring kidney stone/obstructed stone. Will obtain labs, CT, symptom control, and consult urology (patient urologist is Dr. Payton, was told to have ER contact his team)

## 2023-04-15 NOTE — H&P ADULT - NSHPREVIEWOFSYSTEMS_GEN_ALL_CORE
obese 31 y/o  respiratory obstructive sleep apnea with CPAP at home, asthma  abdomen obese unremarkable GI function  /Renal- kidney stones bilateral, hematuria  muscular skeletal- unremarkable  neurological-  unremarkable  psychiatric - unremarkable  Endocrine - diabetes type 2  Allergies - outdoor environmental

## 2023-04-15 NOTE — H&P ADULT - NSICDXPASTSURGICALHX_GEN_ALL_CORE_FT
PAST SURGICAL HISTORY:  History of broken nose repaired    History of removal of ureteral stent     S/P cholecystectomy 2011    S/P ureteral stent placement

## 2023-04-15 NOTE — ED STATDOCS - NS ED ROS FT
REVIEW OF SYSTEMS:  General:  no fever, no chills  HEENT: no headache, no vision changes  Cardiac: no chest pain, no palpitations  Respiratory: no cough, no shortness of breath  Gastrointestinal: +right flank pain that wraps around to abdomen, no nausea, no vomiting, no diarrhea, no melena, no hematochezia   Genitourinary: +hematuria, no dysuria, no urinary frequency, no urinary hesitancy, no abnormal penile discharge  Extremities: no extremity swelling, no extremity pain  Neuro: no focal weakness, no numbness/tingling of the extremities, no decreased sensation  Heme: no easy bleeding, no easy bruising, no anemia  Skin: no abrasions, no jaundice, no pruritis, no rashes, no lesions  -Nury Navarro MD Attending Physician REVIEW OF SYSTEMS:  General:  no fever, no chills  HEENT: no headache, no vision changes  Cardiac: no chest pain, no palpitations  Respiratory: no cough, no shortness of breath  Gastrointestinal: +right flank pain that wraps around to abdomen, +n/v vomiting,   Genitourinary: +hematuria, no dysuria, no urinary frequency, no urinary hesitancy,   -Nury Navarro MD Attending Physician

## 2023-04-15 NOTE — ED ADULT NURSE NOTE - OBJECTIVE STATEMENT
32 yom presents to ed c/o right flank pain nausea and vomiting, uncomfortable in appearance. hx kidney stones had one that was unable to be removed surgically

## 2023-04-15 NOTE — H&P ADULT - ASSESSMENT
Impression: flank pain renal calculi ( right ) , diabetic type 2, sleep pnea with home CPAP  Plan:  patient being admitted and prepped for surgery .

## 2023-04-15 NOTE — ED STATDOCS - OBJECTIVE STATEMENT
31 y/o male with PMHx of DM, sleep apnea, asthma, and kidney stones presents with right flank pain that wraps to the anterior abdomen and intermittent hematuria since 4am this morning. Reports having 2 PCNO procedures to remove kidney stones 1 month ago, and 1 stone remained and was told to get it removed in 6 months. Patient states that current flank pain is in the same area as when he had kidney stones 1 month ago. Patient took buprofen today with no improvement 33 y/o male with PMHx of DM, sleep apnea, asthma, and kidney stones presents with right flank pain that wraps to the anterior abdomen and intermittent hematuria since 4am this morning. Reports having 2 PCNL procedures to remove kidney stones 1 month ago, and 1 stone remained and was told to get it removed in 6 months. Patient states that current flank pain is in the same area as when he had kidney stones 1 month ago. Patient took buprofen today with no improvement, also with emesis

## 2023-04-16 ENCOUNTER — TRANSCRIPTION ENCOUNTER (OUTPATIENT)
Age: 33
End: 2023-04-16

## 2023-04-16 VITALS
TEMPERATURE: 98 F | SYSTOLIC BLOOD PRESSURE: 141 MMHG | OXYGEN SATURATION: 96 % | DIASTOLIC BLOOD PRESSURE: 93 MMHG | RESPIRATION RATE: 19 BRPM | HEART RATE: 104 BPM

## 2023-04-16 LAB
GLUCOSE BLDC GLUCOMTR-MCNC: 200 MG/DL — HIGH (ref 70–99)
GLUCOSE BLDC GLUCOMTR-MCNC: 206 MG/DL — HIGH (ref 70–99)
GLUCOSE BLDC GLUCOMTR-MCNC: 259 MG/DL — HIGH (ref 70–99)
GLUCOSE BLDC GLUCOMTR-MCNC: 318 MG/DL — HIGH (ref 70–99)

## 2023-04-16 PROCEDURE — 94660 CPAP INITIATION&MGMT: CPT

## 2023-04-16 PROCEDURE — 82803 BLOOD GASES ANY COMBINATION: CPT

## 2023-04-16 PROCEDURE — 36415 COLL VENOUS BLD VENIPUNCTURE: CPT

## 2023-04-16 PROCEDURE — 99285 EMERGENCY DEPT VISIT HI MDM: CPT | Mod: 25

## 2023-04-16 PROCEDURE — 83690 ASSAY OF LIPASE: CPT

## 2023-04-16 PROCEDURE — 82962 GLUCOSE BLOOD TEST: CPT

## 2023-04-16 PROCEDURE — C2617: CPT

## 2023-04-16 PROCEDURE — C1758: CPT

## 2023-04-16 PROCEDURE — C9399: CPT

## 2023-04-16 PROCEDURE — 81001 URINALYSIS AUTO W/SCOPE: CPT

## 2023-04-16 PROCEDURE — 0225U NFCT DS DNA&RNA 21 SARSCOV2: CPT

## 2023-04-16 PROCEDURE — 82435 ASSAY OF BLOOD CHLORIDE: CPT

## 2023-04-16 PROCEDURE — 85018 HEMOGLOBIN: CPT

## 2023-04-16 PROCEDURE — 85025 COMPLETE CBC W/AUTO DIFF WBC: CPT

## 2023-04-16 PROCEDURE — 82947 ASSAY GLUCOSE BLOOD QUANT: CPT

## 2023-04-16 PROCEDURE — 74176 CT ABD & PELVIS W/O CONTRAST: CPT | Mod: MA

## 2023-04-16 PROCEDURE — 96361 HYDRATE IV INFUSION ADD-ON: CPT

## 2023-04-16 PROCEDURE — 96375 TX/PRO/DX INJ NEW DRUG ADDON: CPT

## 2023-04-16 PROCEDURE — 84295 ASSAY OF SERUM SODIUM: CPT

## 2023-04-16 PROCEDURE — 76000 FLUOROSCOPY <1 HR PHYS/QHP: CPT

## 2023-04-16 PROCEDURE — U0003: CPT

## 2023-04-16 PROCEDURE — C1769: CPT

## 2023-04-16 PROCEDURE — U0005: CPT

## 2023-04-16 PROCEDURE — 83605 ASSAY OF LACTIC ACID: CPT

## 2023-04-16 PROCEDURE — 87086 URINE CULTURE/COLONY COUNT: CPT

## 2023-04-16 PROCEDURE — 84132 ASSAY OF SERUM POTASSIUM: CPT

## 2023-04-16 PROCEDURE — 80053 COMPREHEN METABOLIC PANEL: CPT

## 2023-04-16 PROCEDURE — 85014 HEMATOCRIT: CPT

## 2023-04-16 PROCEDURE — 82330 ASSAY OF CALCIUM: CPT

## 2023-04-16 PROCEDURE — 96374 THER/PROPH/DIAG INJ IV PUSH: CPT

## 2023-04-16 PROCEDURE — 52332 CYSTOSCOPY AND TREATMENT: CPT | Mod: RT,79

## 2023-04-16 DEVICE — URETERAL CATH OPEN END FLEXI-TIP 5FR .038" X 70CM: Type: IMPLANTABLE DEVICE | Site: RIGHT | Status: FUNCTIONAL

## 2023-04-16 DEVICE — URETERAL STENT CONTOUR 6FR 26CM: Type: IMPLANTABLE DEVICE | Site: RIGHT | Status: FUNCTIONAL

## 2023-04-16 DEVICE — GUIDEWIRE NICORE NITINOL STRAIGHT .035" X 150CM: Type: IMPLANTABLE DEVICE | Site: RIGHT | Status: FUNCTIONAL

## 2023-04-16 DEVICE — URETERAL CATH DUAL LUMEN 10FR 54CM: Type: IMPLANTABLE DEVICE | Site: RIGHT | Status: FUNCTIONAL

## 2023-04-16 DEVICE — URETERAL CATH AXXCESS OPEN END 6FR 70CM: Type: IMPLANTABLE DEVICE | Site: RIGHT | Status: FUNCTIONAL

## 2023-04-16 RX ORDER — CEPHALEXIN 500 MG
1 CAPSULE ORAL
Qty: 0 | Refills: 0
Start: 2023-04-16

## 2023-04-16 RX ORDER — FENTANYL CITRATE 50 UG/ML
25 INJECTION INTRAVENOUS
Refills: 0 | Status: DISCONTINUED | OUTPATIENT
Start: 2023-04-16 | End: 2023-04-16

## 2023-04-16 RX ORDER — LORATADINE 10 MG/1
10 TABLET ORAL DAILY
Refills: 0 | Status: DISCONTINUED | OUTPATIENT
Start: 2023-04-16 | End: 2023-04-16

## 2023-04-16 RX ORDER — ACETAMINOPHEN 500 MG
975 TABLET ORAL EVERY 6 HOURS
Refills: 0 | Status: DISCONTINUED | OUTPATIENT
Start: 2023-04-16 | End: 2023-04-16

## 2023-04-16 RX ORDER — METFORMIN HYDROCHLORIDE 850 MG/1
500 TABLET ORAL DAILY
Refills: 0 | Status: DISCONTINUED | OUTPATIENT
Start: 2023-04-16 | End: 2023-04-16

## 2023-04-16 RX ORDER — HYDROMORPHONE HYDROCHLORIDE 2 MG/ML
0.5 INJECTION INTRAMUSCULAR; INTRAVENOUS; SUBCUTANEOUS EVERY 6 HOURS
Refills: 0 | Status: DISCONTINUED | OUTPATIENT
Start: 2023-04-16 | End: 2023-04-16

## 2023-04-16 RX ORDER — HYDROMORPHONE HYDROCHLORIDE 2 MG/ML
0.5 INJECTION INTRAMUSCULAR; INTRAVENOUS; SUBCUTANEOUS
Refills: 0 | Status: DISCONTINUED | OUTPATIENT
Start: 2023-04-16 | End: 2023-04-16

## 2023-04-16 RX ORDER — INSULIN LISPRO 100/ML
VIAL (ML) SUBCUTANEOUS
Refills: 0 | Status: DISCONTINUED | OUTPATIENT
Start: 2023-04-16 | End: 2023-04-16

## 2023-04-16 RX ORDER — ALLOPURINOL 300 MG
100 TABLET ORAL DAILY
Refills: 0 | Status: DISCONTINUED | OUTPATIENT
Start: 2023-04-16 | End: 2023-04-16

## 2023-04-16 RX ORDER — KETOROLAC TROMETHAMINE 30 MG/ML
15 SYRINGE (ML) INJECTION EVERY 6 HOURS
Refills: 0 | Status: DISCONTINUED | OUTPATIENT
Start: 2023-04-16 | End: 2023-04-16

## 2023-04-16 RX ORDER — DEXTROSE 50 % IN WATER 50 %
25 SYRINGE (ML) INTRAVENOUS ONCE
Refills: 0 | Status: DISCONTINUED | OUTPATIENT
Start: 2023-04-16 | End: 2023-04-16

## 2023-04-16 RX ORDER — GLUCAGON INJECTION, SOLUTION 0.5 MG/.1ML
1 INJECTION, SOLUTION SUBCUTANEOUS ONCE
Refills: 0 | Status: DISCONTINUED | OUTPATIENT
Start: 2023-04-16 | End: 2023-04-16

## 2023-04-16 RX ORDER — SODIUM CHLORIDE 9 MG/ML
1000 INJECTION, SOLUTION INTRAVENOUS
Refills: 0 | Status: DISCONTINUED | OUTPATIENT
Start: 2023-04-16 | End: 2023-04-16

## 2023-04-16 RX ORDER — CEPHALEXIN 500 MG
500 CAPSULE ORAL EVERY 8 HOURS
Refills: 0 | Status: DISCONTINUED | OUTPATIENT
Start: 2023-04-16 | End: 2023-04-16

## 2023-04-16 RX ORDER — TAMSULOSIN HYDROCHLORIDE 0.4 MG/1
0.4 CAPSULE ORAL AT BEDTIME
Refills: 0 | Status: DISCONTINUED | OUTPATIENT
Start: 2023-04-16 | End: 2023-04-16

## 2023-04-16 RX ORDER — ONDANSETRON 8 MG/1
4 TABLET, FILM COATED ORAL ONCE
Refills: 0 | Status: DISCONTINUED | OUTPATIENT
Start: 2023-04-16 | End: 2023-04-16

## 2023-04-16 RX ORDER — OXYCODONE HYDROCHLORIDE 5 MG/1
1 TABLET ORAL
Qty: 7 | Refills: 0
Start: 2023-04-16 | End: 2023-04-18

## 2023-04-16 RX ORDER — CEPHALEXIN 500 MG
1 CAPSULE ORAL
Qty: 9 | Refills: 0
Start: 2023-04-16 | End: 2023-04-18

## 2023-04-16 RX ORDER — ALBUTEROL 90 UG/1
2 AEROSOL, METERED ORAL ONCE
Refills: 0 | Status: DISCONTINUED | OUTPATIENT
Start: 2023-04-16 | End: 2023-04-16

## 2023-04-16 RX ORDER — TAMSULOSIN HYDROCHLORIDE 0.4 MG/1
1 CAPSULE ORAL
Qty: 15 | Refills: 0
Start: 2023-04-16 | End: 2023-04-30

## 2023-04-16 RX ORDER — KETOROLAC TROMETHAMINE 30 MG/ML
30 SYRINGE (ML) INJECTION EVERY 6 HOURS
Refills: 0 | Status: DISCONTINUED | OUTPATIENT
Start: 2023-04-16 | End: 2023-04-16

## 2023-04-16 RX ADMIN — Medication 4: at 17:32

## 2023-04-16 RX ADMIN — SODIUM CHLORIDE 125 MILLILITER(S): 9 INJECTION, SOLUTION INTRAVENOUS at 13:57

## 2023-04-16 RX ADMIN — ONDANSETRON 4 MILLIGRAM(S): 8 TABLET, FILM COATED ORAL at 08:31

## 2023-04-16 RX ADMIN — HYDROMORPHONE HYDROCHLORIDE 0.5 MILLIGRAM(S): 2 INJECTION INTRAMUSCULAR; INTRAVENOUS; SUBCUTANEOUS at 00:28

## 2023-04-16 RX ADMIN — HYDROMORPHONE HYDROCHLORIDE 0.5 MILLIGRAM(S): 2 INJECTION INTRAMUSCULAR; INTRAVENOUS; SUBCUTANEOUS at 06:05

## 2023-04-16 RX ADMIN — Medication 1: at 08:31

## 2023-04-16 RX ADMIN — LORATADINE 10 MILLIGRAM(S): 10 TABLET ORAL at 13:58

## 2023-04-16 RX ADMIN — METFORMIN HYDROCHLORIDE 500 MILLIGRAM(S): 850 TABLET ORAL at 13:58

## 2023-04-16 RX ADMIN — SODIUM CHLORIDE 125 MILLILITER(S): 9 INJECTION, SOLUTION INTRAVENOUS at 05:49

## 2023-04-16 RX ADMIN — Medication 100 MILLIGRAM(S): at 13:58

## 2023-04-16 RX ADMIN — HYDROMORPHONE HYDROCHLORIDE 0.5 MILLIGRAM(S): 2 INJECTION INTRAMUSCULAR; INTRAVENOUS; SUBCUTANEOUS at 00:45

## 2023-04-16 RX ADMIN — HYDROMORPHONE HYDROCHLORIDE 0.5 MILLIGRAM(S): 2 INJECTION INTRAMUSCULAR; INTRAVENOUS; SUBCUTANEOUS at 05:48

## 2023-04-16 NOTE — BRIEF OPERATIVE NOTE - NSICDXBRIEFPOSTOP_GEN_ALL_CORE_FT
POST-OP DIAGNOSIS:  Renal colic 16-Apr-2023 10:41:17  Alvaro Terry  Ureteral stone 16-Apr-2023 10:41:23  Alvaro Terry  Kidney stone 16-Apr-2023 10:41:29  Alvaro Terry

## 2023-04-16 NOTE — DISCHARGE NOTE PROVIDER - CARE PROVIDER_API CALL
David Payton)  Urology  [address], [address]  [place] [address]  Phone: [phone]  Fax: [phone]  Established Patient  Scheduled Appointment: 04/19/2023

## 2023-04-16 NOTE — DISCHARGE NOTE NURSING/CASE MANAGEMENT/SOCIAL WORK - PATIENT PORTAL LINK FT
You can access the FollowMyHealth Patient Portal offered by [facility] by registering at the following website: [email or web address]. By joining [employer]’s FollowMyHealth portal, you will also be able to view your health information using other applications (apps) compatible with our system. LABS and ADDITIONAL STUDIES:                        15.6   18.81 )-----------( 289      ( 21 Dec 2019 18:40 )             50.7   Complete Blood Count + Automated Diff (19 @ 18:40)    Auto Neutrophil #: 10.50 K/uL    Auto Neutrophil %: 55.8 %    Neutrophils %: 53.0 %        139  |  102  |  16  ----------------------------<  95  5.3   |  21<L>  |  0.72    Ca    9.5      21 Dec 2019 18:40  Phos  3.2       Mg     1.9         TPro  6.6  /  Alb  3.8  /  TBili  0.5  /  DBili  x   /  AST  70<H>  /  ALT  24  /  AlkPhos  67      LIVER FUNCTIONS - ( 21 Dec 2019 18:40 )  Alb: 3.8 g/dL / Pro: 6.6 g/dL / ALK PHOS: 67 u/L / ALT: 24 u/L / AST: 70 u/L / GGT: x           Troponin T, High Sensitivity (19 @ 22:35)    Troponin T, High Sensitivity: 18    Rapid Respiratory Viral Panel (19 @ 19:45)    Adenovirus (RapRVP): Not Detected    Influenza A (RapRVP): Not Detected    Influenza AH1 2009 (RapRVP): Not Detected    Influenza AH1 (RapRVP): Not Detected    Influenza AH3 (RapRVP): Not Detected    Influenza B (RapRVP): Not Detected    Parainfluenza 1 (RapRVP): Not Detected    Parainfluenza 2 (RapRVP): Not Detected    Parainfluenza 3 (RapRVP): Not Detected    Parainfluenza 4 (RapRVP): Not Detected    Resp Syncytial Virus (RapRVP): Not Detected    Chlamydia pneumoniae (RapRVP): Not Detected    Mycoplasma pneumoniae (RapRVP): Not Detected    Entero/Rhinovirus (RapRVP): Not Detected    hMPV (RapRVP): Not Detected    Coronavirus (229E,HKU1,NL63,OC43): Not Detected     Urinalysis Basic - ( 21 Dec 2019 21:45 )  Color: YELLOW / Appearance: CLEAR / S.027 / pH: 6.0  Gluc: NEGATIVE / Ketone: TRACE  / Bili: NEGATIVE / Urobili: NORMAL   Blood: MODERATE / Protein: 50 / Nitrite: NEGATIVE   Leuk Esterase: TRACE / RBC: 3-5 / WBC 3-5   Sq Epi: OCC / Non Sq Epi: x / Bacteria: FEW    < from: CT Head No Cont (19 @ 19:22) >    IMPRESSION:   No acute intracranial bleeding, mass effect, or shift.      < end of copied text >    < from: Xray Chest 1 View- PORTABLE-Urgent (19 @ 20:08) >    ******PRELIMINARY REPORT******        INTERPRETATION:  no emergent findings    < end of copied text >    EKG - LABS and ADDITIONAL STUDIES:                        15.6   18.81 )-----------( 289      ( 21 Dec 2019 18:40 )             50.7   Complete Blood Count + Automated Diff (19 @ 18:40)    Auto Neutrophil #: 10.50 K/uL    Auto Neutrophil %: 55.8 %    Neutrophils %: 53.0 %        139  |  102  |  16  ----------------------------<  95  5.3   |  21<L>  |  0.72    Ca    9.5      21 Dec 2019 18:40  Phos  3.2       Mg     1.9         TPro  6.6  /  Alb  3.8  /  TBili  0.5  /  DBili  x   /  AST  70<H>  /  ALT  24  /  AlkPhos  67      LIVER FUNCTIONS - ( 21 Dec 2019 18:40 )  Alb: 3.8 g/dL / Pro: 6.6 g/dL / ALK PHOS: 67 u/L / ALT: 24 u/L / AST: 70 u/L / GGT: x           Troponin T, High Sensitivity (19 @ 22:35)    Troponin T, High Sensitivity: 18    Rapid Respiratory Viral Panel (19 @ 19:45)    Adenovirus (RapRVP): Not Detected    Influenza A (RapRVP): Not Detected    Influenza AH1 2009 (RapRVP): Not Detected    Influenza AH1 (RapRVP): Not Detected    Influenza AH3 (RapRVP): Not Detected    Influenza B (RapRVP): Not Detected    Parainfluenza 1 (RapRVP): Not Detected    Parainfluenza 2 (RapRVP): Not Detected    Parainfluenza 3 (RapRVP): Not Detected    Parainfluenza 4 (RapRVP): Not Detected    Resp Syncytial Virus (RapRVP): Not Detected    Chlamydia pneumoniae (RapRVP): Not Detected    Mycoplasma pneumoniae (RapRVP): Not Detected    Entero/Rhinovirus (RapRVP): Not Detected    hMPV (RapRVP): Not Detected    Coronavirus (229E,HKU1,NL63,OC43): Not Detected     Urinalysis Basic - ( 21 Dec 2019 21:45 )  Color: YELLOW / Appearance: CLEAR / S.027 / pH: 6.0  Gluc: NEGATIVE / Ketone: TRACE  / Bili: NEGATIVE / Urobili: NORMAL   Blood: MODERATE / Protein: 50 / Nitrite: NEGATIVE   Leuk Esterase: TRACE / RBC: 3-5 / WBC 3-5   Sq Epi: OCC / Non Sq Epi: x / Bacteria: FEW    < from: CT Head No Cont (19 @ 19:22) >    IMPRESSION:   No acute intracranial bleeding, mass effect, or shift.      < end of copied text >    < from: Xray Chest 1 View- PORTABLE-Urgent (19 @ 20:08) >    ******PRELIMINARY REPORT******        INTERPRETATION:  no emergent findings    < end of copied text >    EKG - poor baseline 2/2 dyskinesia, overall seems normal sinus

## 2023-04-16 NOTE — DISCHARGE NOTE PROVIDER - NSDCMRMEDTOKEN_GEN_ALL_CORE_FT
Advair Diskus 250 mcg-50 mcg inhalation powder: 1 puff(s) inhaled 2 times a day, As Needed  Albuterol (Eqv-ProAir HFA) 90 mcg/inh inhalation aerosol: 2 puff(s) inhaled every 6 hours, As Needed  allopurinol 100 mg oral tablet: 1 tab(s) orally once a day  ferrous sulfate 324 mg (65 mg elemental iron) oral delayed release tablet: 1 tab(s) orally once a day   metFORMIN 500 mg oral tablet: 1 tab(s) orally once a day  ondansetron 4 mg oral tablet: 1 tab(s) orally every 8 hours  ondansetron 4 mg oral tablet, disintegratin tab(s) orally 3 times a day   potassium citrate 15 mEq oral tablet, extended release: 1 tab(s) orally 4 times a day   tamsulosin 0.4 mg oral capsule: 1 cap(s) orally once a day MDD: 1 caps  Tylenol 500 mg oral tablet: 2 tab(s) orally every 6 hours, As Needed  Xyzal 5 mg oral tablet: 1 tab(s) orally once a day (in the evening)   Advair Diskus 250 mcg-50 mcg inhalation powder: 1 puff(s) inhaled 2 times a day, As Needed  Albuterol (Eqv-ProAir HFA) 90 mcg/inh inhalation aerosol: 2 puff(s) inhaled every 6 hours, As Needed  allopurinol 100 mg oral tablet: 1 tab(s) orally once a day  ferrous sulfate 324 mg (65 mg elemental iron) oral delayed release tablet: 1 tab(s) orally once a day   metFORMIN 500 mg oral tablet: 1 tab(s) orally once a day  ondansetron 4 mg oral tablet: 1 tab(s) orally every 8 hours  ondansetron 4 mg oral tablet, disintegratin tab(s) orally 3 times a day   oxyCODONE 5 mg oral tablet: 1 tab(s) orally every 8 hours as needed for  moderate pain MDD: 3 TABS  potassium citrate 15 mEq oral tablet, extended release: 1 tab(s) orally 4 times a day   tamsulosin 0.4 mg oral capsule: 1 cap(s) orally once a day MDD: 1 caps  Tylenol 500 mg oral tablet: 2 tab(s) orally every 6 hours, As Needed  Xyzal 5 mg oral tablet: 1 tab(s) orally once a day (in the evening)   Epigastric pain

## 2023-04-16 NOTE — DISCHARGE NOTE PROVIDER - NSDCFUSCHEDAPPT_GEN_ALL_CORE_FT
David Payton  [facility] Physician [facility]  UROLOGY [address] [facility]  Scheduled Appointment: 04/19/2023

## 2023-04-16 NOTE — BRIEF OPERATIVE NOTE - NSICDXBRIEFPREOP_GEN_ALL_CORE_FT
PRE-OP DIAGNOSIS:  Renal colic 16-Apr-2023 10:40:18  Alvaro Terry  Ureteral stone 16-Apr-2023 10:40:30  Alvaro Terry  Kidney stone 16-Apr-2023 10:40:36  Alvaro Terry

## 2023-04-16 NOTE — DISCHARGE NOTE PROVIDER - NSDCCPTREATMENT_GEN_ALL_CORE_FT
PRINCIPAL PROCEDURE  Procedure: Cystoscopic insertion of ureteral stent  Findings and Treatment: right stent

## 2023-04-16 NOTE — DISCHARGE NOTE PROVIDER - HOSPITAL COURSE
33 y/o male presents to ER with right flank pain & hematuria x2 days, renal surgery for stones one month ago  flank pain.  Patient well known to urology service with past procedures.  Patient was seen in ER and admitted for right renal colic  and obstructive uropathy.  Patient was started on medication/ antibiotics and taken to OR morning of 4/16/23 for a cystoscopy right ureteral stent placed. Patient taken to OR and underwent successful cystoscopy and placement stent.     33 y/o male presents to ER with right flank pain & hematuria x2 days, renal surgery for stones one month ago  flank pain.  Patient well known to urology service with past procedures.  Patient was seen in ER and admitted for right renal colic  and obstructive uropathy.  Patient was started on medication/ antibiotics and taken to OR morning of 4/16/23 for a cystoscopy right ureteral stent placed. Patient taken to OR and underwent successful cystoscopy and placement stent   Patient tolerated procedure well, awake, alert and responsive feels well minimal discomfort, OOB and ambulating well, denies headache, dizziness, or any nausea or vomiting.  Spoke with patient fully discharge instructions with maintaining his previous appointment with Dr. Payton in the office on Wednesday 4/19/23.  He understands.  Discharge home today.

## 2023-04-16 NOTE — DISCHARGE NOTE PROVIDER - NSDCCPCAREPLAN_GEN_ALL_CORE_FT
PRINCIPAL DISCHARGE DIAGNOSIS  Diagnosis: Renal colic on right side  Assessment and Plan of Treatment:

## 2023-04-17 LAB
CULTURE RESULTS: SIGNIFICANT CHANGE UP
SPECIMEN SOURCE: SIGNIFICANT CHANGE UP

## 2023-04-19 ENCOUNTER — APPOINTMENT (OUTPATIENT)
Dept: UROLOGY | Facility: CLINIC | Age: 33
End: 2023-04-19
Payer: COMMERCIAL

## 2023-04-19 PROCEDURE — 99214 OFFICE O/P EST MOD 30 MIN: CPT | Mod: 24

## 2023-04-20 NOTE — HISTORY OF PRESENT ILLNESS
[FreeTextEntry1] : 31 yo M\par recent complicated treatment for nephrolithiasis\par had right side staged PCNL, was still with residual stone in the lower pole that I cold not reach\par was planned fro repeat CT and weight loss\par had another small stone in the left kidney for follow up\par \par was in  the ER only a few days ago\par left stone - passed\par right stone in the ureter, two other small stones in the lower pole\par stent inserted\par now feeling well\par \par discussed ureteroscopy\par Risks, benefits and alternatives discussed. Procedure, in hospital stay and recovery explained. Risk of infection, multiple procedures, ureteral injury, ureteral stricture, incomplete stone clearance, sepsis, bleeding, and retention, all discussed.\par  \par will continue medical treatment for urine alkalization \par \par plan:\par - right ureteroscopy\par - 24 hr urine

## 2023-04-21 ENCOUNTER — NON-APPOINTMENT (OUTPATIENT)
Age: 33
End: 2023-04-21

## 2023-05-12 ENCOUNTER — OUTPATIENT (OUTPATIENT)
Dept: OUTPATIENT SERVICES | Facility: HOSPITAL | Age: 33
LOS: 1 days | End: 2023-05-12
Payer: COMMERCIAL

## 2023-05-12 VITALS
HEIGHT: 67 IN | RESPIRATION RATE: 16 BRPM | WEIGHT: 315 LBS | OXYGEN SATURATION: 97 % | HEART RATE: 96 BPM | SYSTOLIC BLOOD PRESSURE: 120 MMHG | TEMPERATURE: 97 F | DIASTOLIC BLOOD PRESSURE: 70 MMHG

## 2023-05-12 DIAGNOSIS — G47.33 OBSTRUCTIVE SLEEP APNEA (ADULT) (PEDIATRIC): ICD-10-CM

## 2023-05-12 DIAGNOSIS — N20.0 CALCULUS OF KIDNEY: ICD-10-CM

## 2023-05-12 DIAGNOSIS — Z87.81 PERSONAL HISTORY OF (HEALED) TRAUMATIC FRACTURE: Chronic | ICD-10-CM

## 2023-05-12 DIAGNOSIS — E11.9 TYPE 2 DIABETES MELLITUS WITHOUT COMPLICATIONS: ICD-10-CM

## 2023-05-12 DIAGNOSIS — Z01.818 ENCOUNTER FOR OTHER PREPROCEDURAL EXAMINATION: ICD-10-CM

## 2023-05-12 DIAGNOSIS — Z98.890 OTHER SPECIFIED POSTPROCEDURAL STATES: Chronic | ICD-10-CM

## 2023-05-12 DIAGNOSIS — Z29.9 ENCOUNTER FOR PROPHYLACTIC MEASURES, UNSPECIFIED: ICD-10-CM

## 2023-05-12 DIAGNOSIS — Z90.49 ACQUIRED ABSENCE OF OTHER SPECIFIED PARTS OF DIGESTIVE TRACT: Chronic | ICD-10-CM

## 2023-05-12 DIAGNOSIS — Z96.0 PRESENCE OF UROGENITAL IMPLANTS: Chronic | ICD-10-CM

## 2023-05-12 LAB
A1C WITH ESTIMATED AVERAGE GLUCOSE RESULT: 8.3 % — HIGH (ref 4–5.6)
ALBUMIN SERPL ELPH-MCNC: 3.9 G/DL — SIGNIFICANT CHANGE UP (ref 3.3–5.2)
ALP SERPL-CCNC: 104 U/L — SIGNIFICANT CHANGE UP (ref 40–120)
ALT FLD-CCNC: 50 U/L — HIGH
ANION GAP SERPL CALC-SCNC: 15 MMOL/L — SIGNIFICANT CHANGE UP (ref 5–17)
APPEARANCE UR: CLEAR — SIGNIFICANT CHANGE UP
APTT BLD: 31.9 SEC — SIGNIFICANT CHANGE UP (ref 27.5–35.5)
AST SERPL-CCNC: 40 U/L — HIGH
BACTERIA # UR AUTO: ABNORMAL
BASOPHILS # BLD AUTO: 0.06 K/UL — SIGNIFICANT CHANGE UP (ref 0–0.2)
BASOPHILS NFR BLD AUTO: 0.9 % — SIGNIFICANT CHANGE UP (ref 0–2)
BILIRUB SERPL-MCNC: 0.3 MG/DL — LOW (ref 0.4–2)
BILIRUB UR-MCNC: NEGATIVE — SIGNIFICANT CHANGE UP
BLD GP AB SCN SERPL QL: SIGNIFICANT CHANGE UP
BUN SERPL-MCNC: 10.8 MG/DL — SIGNIFICANT CHANGE UP (ref 8–20)
CALCIUM SERPL-MCNC: 9.4 MG/DL — SIGNIFICANT CHANGE UP (ref 8.4–10.5)
CHLORIDE SERPL-SCNC: 94 MMOL/L — LOW (ref 96–108)
CO2 SERPL-SCNC: 23 MMOL/L — SIGNIFICANT CHANGE UP (ref 22–29)
COLOR SPEC: YELLOW — SIGNIFICANT CHANGE UP
CREAT SERPL-MCNC: 0.58 MG/DL — SIGNIFICANT CHANGE UP (ref 0.5–1.3)
DIFF PNL FLD: ABNORMAL
EGFR: 132 ML/MIN/1.73M2 — SIGNIFICANT CHANGE UP
EOSINOPHIL # BLD AUTO: 0.19 K/UL — SIGNIFICANT CHANGE UP (ref 0–0.5)
EOSINOPHIL NFR BLD AUTO: 2.8 % — SIGNIFICANT CHANGE UP (ref 0–6)
EPI CELLS # UR: SIGNIFICANT CHANGE UP
ESTIMATED AVERAGE GLUCOSE: 192 MG/DL — HIGH (ref 68–114)
GLUCOSE SERPL-MCNC: 254 MG/DL — HIGH (ref 70–99)
GLUCOSE UR QL: 250 MG/DL
HCT VFR BLD CALC: 38.6 % — LOW (ref 39–50)
HGB BLD-MCNC: 12.4 G/DL — LOW (ref 13–17)
IMM GRANULOCYTES NFR BLD AUTO: 0.4 % — SIGNIFICANT CHANGE UP (ref 0–0.9)
INR BLD: 1.03 RATIO — SIGNIFICANT CHANGE UP (ref 0.88–1.16)
KETONES UR-MCNC: NEGATIVE — SIGNIFICANT CHANGE UP
LEUKOCYTE ESTERASE UR-ACNC: ABNORMAL
LYMPHOCYTES # BLD AUTO: 2.09 K/UL — SIGNIFICANT CHANGE UP (ref 1–3.3)
LYMPHOCYTES # BLD AUTO: 31.2 % — SIGNIFICANT CHANGE UP (ref 13–44)
MCHC RBC-ENTMCNC: 26.6 PG — LOW (ref 27–34)
MCHC RBC-ENTMCNC: 32.1 GM/DL — SIGNIFICANT CHANGE UP (ref 32–36)
MCV RBC AUTO: 82.8 FL — SIGNIFICANT CHANGE UP (ref 80–100)
MONOCYTES # BLD AUTO: 0.45 K/UL — SIGNIFICANT CHANGE UP (ref 0–0.9)
MONOCYTES NFR BLD AUTO: 6.7 % — SIGNIFICANT CHANGE UP (ref 2–14)
NEUTROPHILS # BLD AUTO: 3.87 K/UL — SIGNIFICANT CHANGE UP (ref 1.8–7.4)
NEUTROPHILS NFR BLD AUTO: 58 % — SIGNIFICANT CHANGE UP (ref 43–77)
NITRITE UR-MCNC: NEGATIVE — SIGNIFICANT CHANGE UP
PH UR: 5 — SIGNIFICANT CHANGE UP (ref 5–8)
PLATELET # BLD AUTO: 226 K/UL — SIGNIFICANT CHANGE UP (ref 150–400)
POTASSIUM SERPL-MCNC: 4.4 MMOL/L — SIGNIFICANT CHANGE UP (ref 3.5–5.3)
POTASSIUM SERPL-SCNC: 4.4 MMOL/L — SIGNIFICANT CHANGE UP (ref 3.5–5.3)
PROT SERPL-MCNC: 6.8 G/DL — SIGNIFICANT CHANGE UP (ref 6.6–8.7)
PROT UR-MCNC: 15
PROTHROM AB SERPL-ACNC: 11.9 SEC — SIGNIFICANT CHANGE UP (ref 10.5–13.4)
RBC # BLD: 4.66 M/UL — SIGNIFICANT CHANGE UP (ref 4.2–5.8)
RBC # FLD: 13.9 % — SIGNIFICANT CHANGE UP (ref 10.3–14.5)
RBC CASTS # UR COMP ASSIST: ABNORMAL /HPF (ref 0–4)
SODIUM SERPL-SCNC: 132 MMOL/L — LOW (ref 135–145)
SP GR SPEC: 1.01 — SIGNIFICANT CHANGE UP (ref 1.01–1.02)
UROBILINOGEN FLD QL: NEGATIVE MG/DL — SIGNIFICANT CHANGE UP
WBC # BLD: 6.69 K/UL — SIGNIFICANT CHANGE UP (ref 3.8–10.5)
WBC # FLD AUTO: 6.69 K/UL — SIGNIFICANT CHANGE UP (ref 3.8–10.5)
WBC UR QL: ABNORMAL /HPF (ref 0–5)

## 2023-05-12 PROCEDURE — 93010 ELECTROCARDIOGRAM REPORT: CPT

## 2023-05-12 PROCEDURE — 93005 ELECTROCARDIOGRAM TRACING: CPT

## 2023-05-12 PROCEDURE — G0463: CPT

## 2023-05-12 RX ORDER — ACETAMINOPHEN 500 MG
2 TABLET ORAL
Qty: 0 | Refills: 0 | DISCHARGE

## 2023-05-12 NOTE — H&P PST ADULT - NSICDXPASTMEDICALHX_GEN_ALL_CORE_FT
PAST MEDICAL HISTORY:  Asthma     Calculus of kidney     COVID-19 vaccine series completed     DM2 (diabetes mellitus, type 2)     Kidney stones     Nephrolithiasis     Obesity, morbid     Obstructive sleep apnea (adult) (pediatric) Community Status: Active    Seasonal allergies     Sleep apnea, obstructive

## 2023-05-12 NOTE — H&P PST ADULT - PROBLEM SELECTOR PLAN 4
Total Score [        ]    Caprini Score 0-2: Low Risk, NO VTE prophylaxis required for most patients, encourage ambulation  Caprini Score 3-6: Moderate Risk , pharmacologic VTE prophylaxis is indicated for most patients (in the absence of contraindications)  Caprini Score Greater than or =7: High risk, pharmocologic VTE prophylaxis indicated for most patients (in the absence of contraindications) Total Score [     4   ]    Caprini Score 3-6: Moderate Risk , pharmacologic VTE prophylaxis is indicated for most patients (in the absence of contraindications)

## 2023-05-12 NOTE — H&P PST ADULT - NEGATIVE GENERAL GENITOURINARY SYMPTOMS
See HPI/no hematuria/no renal colic/no flank pain L/no flank pain R/no urine discoloration/no gas in urine/no bladder infections/no incontinence/no dysuria/no urinary hesitancy/normal urinary frequency

## 2023-05-12 NOTE — H&P PST ADULT - ASSESSMENT
CAPRINI SCORE    AGE RELATED RISK FACTORS                                                             [ ] Age 41-60 years                                            (1 Point)  [ ] Age: 61-74 years                                           (2 Points)                 [ ] Age= 75 years                                                (3 Points)             DISEASE RELATED RISK FACTORS                                                       [ ] Edema in the lower extremities                 (1 Point)                     [ ] Varicose veins                                               (1 Point)                                 [ ] BMI > 25 Kg/m2                                            (1 Point)                                  [ ] Serious infection (ie PNA)                            (1 Point)                     [ ] Lung disease ( COPD, Emphysema)            (1 Point)                                                                          [ ] Acute myocardial infarction                         (1 Point)                  [ ] Congestive heart failure (in the previous month)  (1 Point)         [ ] Inflammatory bowel disease                            (1 Point)                  [ ] Central venous access, PICC or Port               (2 points)       (within the last month)                                                                [ ] Stroke (in the previous month)                        (5 Points)    [ ] Previous or present malignancy                       (2 points)                                                                                                                                                         HEMATOLOGY RELATED FACTORS                                                         [ ] Prior episodes of VTE                                     (3 Points)                     [ ] Positive family history for VTE                      (3 Points)                  [ ] Prothrombin 87275 A                                     (3 Points)                     [ ] Factor V Leiden                                                (3 Points)                        [ ] Lupus anticoagulants                                      (3 Points)                                                           [ ] Anticardiolipin antibodies                              (3 Points)                                                       [ ] High homocysteine in the blood                   (3 Points)                                             [ ] Other congenital or acquired thrombophilia      (3 Points)                                                [ ] Heparin induced thrombocytopenia                  (3 Points)                                        MOBILITY RELATED FACTORS  [ ] Bed rest                                                         (1 Point)  [ ] Plaster cast                                                    (2 points)  [ ] Bed bound for more than 72 hours           (2 Points)    GENDER SPECIFIC FACTORS  [ ] Pregnancy or had a baby within the last month   (1 Point)  [ ] Post-partum < 6 weeks                                   (1 Point)  [ ] Hormonal therapy  or oral contraception   (1 Point)  [ ] History of pregnancy complications              (1 point)  [ ] Unexplained or recurrent              (1 Point)    OTHER RISK FACTORS                                           (1 Point)  [ ] BMI >40, smoking, diabetes requiring insulin, chemotherapy  blood transfusions and length of surgery over 2 hours    SURGERY RELATED RISK FACTORS  [ ]  Section within the last month     (1 Point)  [ ] Minor surgery                                                  (1 Point)  [ ] Arthroscopic surgery                                       (2 Points)  [ ] Planned major surgery lasting more            (2 Points)      than 45 minutes     [ ] Elective hip or knee joint replacement       (5 points)       surgery                                                TRAUMA RELATED RISK FACTORS  [ ] Fracture of the hip, pelvis, or leg                       (5 Points)  [ ] Spinal cord injury resulting in paralysis             (5 points)       (in the previous month)    [ ] Paralysis  (less than 1 month)                             (5 Points)  [ ] Multiple Trauma within 1 month                        (5 Points)    Total Score [        ]    Caprini Score 0-2: Low Risk, NO VTE prophylaxis required for most patients, encourage ambulation  Caprini Score 3-6: Moderate Risk , pharmacologic VTE prophylaxis is indicated for most patients (in the absence of contraindications)  Caprini Score Greater than or =7: High risk, pharmocologic VTE prophylaxis indicated for most patients (in the absence of contraindications)    OPIOID RISK TOOL    PATRICIA EACH BOX THAT APPLIES AND ADD TOTALS AT THE END    FAMILY HISTORY OF SUBSTANCE ABUSE                 FEMALE         MALE                                                Alcohol                             [  ]1 pt          [  ]3pts                                               Illegal Durgs                     [  ]2 pts        [  ]3pts                                               Rx Drugs                           [  ]4 pts        [  ]4 pts    PERSONAL HISTORY OF SUBSTANCE ABUSE                                                                                          Alcohol                             [  ]3 pts       [  ]3 pts                                               Illegal Drugs                     [  ]4 pts        [  ]4 pts                                               Rx Drugs                           [  ]5 pts        [  ]5 pts    AGE BETWEEN 16-45 YEARS                                      [  ]1 pt         [  ]1 pt    HISTORY OF PREADOLESCENT   SEXUAL ABUSE                                                             [  ]3 pts        [  ]0pts    PSYCHOLOGICAL DISEASE                     ADD, OCD, Bipolar, Schizophrenia        [  ]2 pts         [  ]2 pts                      Depression                                               [  ]1 pt           [  ]1 pt           SCORING TOTAL   (0                                    A score of 3 or lower indicated LOW risk for future opioid abuse  A score of 4 to 7 indicated moderate risk for future opioid abuse  A score of 8 or higher indicates a high risk for opioid abuse                               Pt is a pleasant 33 y/o male, PMH Morbid Obesity, diabetes, ARACELIS using CPAP, presents to PST with c/o calculus of kidney.  Cameron explains having original lithotripsy with stent placement , subsequent lithotripsy with additional stent placement 10/2022, underwent PCNL 2023 in which 3 stones were retrieved, following day CT scan revealed additional stone to right ureter.  Pt reports feeling generally well otherwise, reports 4/10 aching right flank discomfort, denies urgency/frequency, dysuria, hematuria, fever/cough/cold, infection or recent abx use.  Scheduled for Right Ureteroscopy, Laser Lithotripsy on 2023 with Dr. Payton, pending medical clearance.     CAPRINI SCORE    AGE RELATED RISK FACTORS                                                             [ ] Age 41-60 years                                            (1 Point)  [ ] Age: 61-74 years                                           (2 Points)                 [ ] Age= 75 years                                                (3 Points)             DISEASE RELATED RISK FACTORS                                                       [ ] Edema in the lower extremities                 (1 Point)                     [ ] Varicose veins                                               (1 Point)                                 [X ] BMI > 25 Kg/m2                                            (1 Point)                                  [ ] Serious infection (ie PNA)                            (1 Point)                     [ ] Lung disease ( COPD, Emphysema)            (1 Point)                                                                          [ ] Acute myocardial infarction                         (1 Point)                  [ ] Congestive heart failure (in the previous month)  (1 Point)         [ ] Inflammatory bowel disease                            (1 Point)                  [ ] Central venous access, PICC or Port               (2 points)       (within the last month)                                                                [ ] Stroke (in the previous month)                        (5 Points)    [ ] Previous or present malignancy                       (2 points)                                                                                                                                                         HEMATOLOGY RELATED FACTORS                                                         [ ] Prior episodes of VTE                                     (3 Points)                     [ ] Positive family history for VTE                      (3 Points)                  [ ] Prothrombin 31700 A                                     (3 Points)                     [ ] Factor V Leiden                                                (3 Points)                        [ ] Lupus anticoagulants                                      (3 Points)                                                           [ ] Anticardiolipin antibodies                              (3 Points)                                                       [ ] High homocysteine in the blood                   (3 Points)                                             [ ] Other congenital or acquired thrombophilia      (3 Points)                                                [ ] Heparin induced thrombocytopenia                  (3 Points)                                        MOBILITY RELATED FACTORS  [ ] Bed rest                                                         (1 Point)  [ ] Plaster cast                                                    (2 points)  [ ] Bed bound for more than 72 hours           (2 Points)    GENDER SPECIFIC FACTORS  [ ] Pregnancy or had a baby within the last month   (1 Point)  [ ] Post-partum < 6 weeks                                   (1 Point)  [ ] Hormonal therapy  or oral contraception   (1 Point)  [ ] History of pregnancy complications              (1 point)  [ ] Unexplained or recurrent              (1 Point)    OTHER RISK FACTORS                                           (1 Point)  [X ] BMI >40, smoking, diabetes requiring insulin, chemotherapy  blood transfusions and length of surgery over 2 hours    SURGERY RELATED RISK FACTORS  [ ]  Section within the last month     (1 Point)  [ ] Minor surgery                                                  (1 Point)  [ ] Arthroscopic surgery                                       (2 Points)  [ X] Planned major surgery lasting more            (2 Points)      than 45 minutes     [ ] Elective hip or knee joint replacement       (5 points)       surgery                                                TRAUMA RELATED RISK FACTORS  [ ] Fracture of the hip, pelvis, or leg                       (5 Points)  [ ] Spinal cord injury resulting in paralysis             (5 points)       (in the previous month)    [ ] Paralysis  (less than 1 month)                             (5 Points)  [ ] Multiple Trauma within 1 month                        (5 Points)    Total Score [   4     ]    Caprini Score 0-2: Low Risk, NO VTE prophylaxis required for most patients, encourage ambulation  Caprini Score 3-6: Moderate Risk , pharmacologic VTE prophylaxis is indicated for most patients (in the absence of contraindications)  Caprini Score Greater than or =7: High risk, pharmocologic VTE prophylaxis indicated for most patients (in the absence of contraindications)    OPIOID RISK TOOL    PATRICIA EACH BOX THAT APPLIES AND ADD TOTALS AT THE END    FAMILY HISTORY OF SUBSTANCE ABUSE                 FEMALE         MALE                                                Alcohol                             [  ]1 pt          [  ]3pts                                               Illegal Durgs                     [  ]2 pts        [  ]3pts                                               Rx Drugs                           [  ]4 pts        [  ]4 pts    PERSONAL HISTORY OF SUBSTANCE ABUSE                                                                                          Alcohol                             [  ]3 pts       [  ]3 pts                                               Illegal Drugs                     [  ]4 pts        [  ]4 pts                                               Rx Drugs                           [  ]5 pts        [  ]5 pts    AGE BETWEEN 16-45 YEARS                                      [  ]1 pt         [  ]1 pt    HISTORY OF PREADOLESCENT   SEXUAL ABUSE                                                             [  ]3 pts        [  ]0pts    PSYCHOLOGICAL DISEASE                     ADD, OCD, Bipolar, Schizophrenia        [  ]2 pts         [  ]2 pts                      Depression                                               [  ]1 pt           [  ]1 pt           SCORING TOTAL   (0                                    A score of 3 or lower indicated LOW risk for future opioid abuse  A score of 4 to 7 indicated moderate risk for future opioid abuse  A score of 8 or higher indicates a high risk for opioid abuse

## 2023-05-12 NOTE — H&P PST ADULT - PROBLEM SELECTOR PLAN 1
EKG, labs completed today, results pending  Patient educated on surgical scrub, COVID testing, preadmission instructions, medical clearance and day of procedure medications, verbalizes understanding.   Pt instructed to stop vitamins/supplements/herbal medications/ASA/NSAIDS for one week prior to surgery and discuss with PMD.   Written & verbal instructions provided to pt for all education/instructions, questions encouraged/addressed, pt verbalized understandings of all education/instructions, teach back method utilized Scheduled for Right Ureteroscopy, Laser Lithotripsy on 5.16.2023 with Dr. Payton, pending medical clearance.   EKG, labs completed today, results pending  Patient educated on COVID testing, preadmission instructions, medical clearance and day of procedure medications, verbalizes understanding.   Pt instructed to stop vitamins/supplements/herbal medications/ASA/NSAIDS for one week prior to surgery and discuss with PMD.   Written & verbal instructions provided to pt for all education/instructions, questions encouraged/addressed, pt verbalized understandings of all education/instructions, teach back method utilized

## 2023-05-12 NOTE — H&P PST ADULT - NEGATIVE RESPIRATORY AND THORAX SYMPTOMS
hx ARACELIS, using CPAP - following pulmonology annually/no wheezing/no dyspnea/no cough/no hemoptysis/no pleuritic chest pain

## 2023-05-12 NOTE — H&P PST ADULT - HISTORY OF PRESENT ILLNESS
Normal rate, regular rhythm.  Heart sounds S1, S2.  No murmurs, rubs or gallops. Pt is a pleasant 33 y/o male, PMH Morbid Obesity, diabetes, ARACELIS, presents to PST with c/o  .      was in the ER only a few days ago  left stone - passed  right stone in the ureter, two other small stones in the lower pole  stent inserted  now feeling well    Pt reports feeling generally well, denies recent fever/cough/cold, infection or abx use.  Scheduled for Right Ureteroscopy, Laser Lithotripsy on 5.16.2023 with Dr. Payton, pending medical clearance.  Pt is a pleasant 33 y/o male, PMH Morbid Obesity, diabetes, ARACELIS using CPAP, presents to PST with c/o  .    06/2022 had lithotripsy, subsuquent litho 10/2022 both with stent placement, 03/2023 PCNL retrieved 3/4 stones, following day CT scan revealed additional stone which is now in right ureter    was in the ER only a few days ago  left stone - passed  right stone in the ureter, two other small stones in the lower pole  stent inserted  now feeling well    Pt reports feeling generally well, denies recent fever/cough/cold, infection or abx use.  Scheduled for Right Ureteroscopy, Laser Lithotripsy on 5.16.2023 with Dr. Payton, pending medical clearance.  Pt is a pleasant 33 y/o male, PMH Morbid Obesity, diabetes, ARACELIS using CPAP, presents to PST with c/o calculus of kidney.  Cameron explains having original lithotripsy with stent placement 06/22, subsequent lithotripsy with additional stent placement 10/2022, underwent PCNL 03/2023 in which 3 stones were retrieved, following day CT scan revealed additional stone to right ureter.  Pt reports feeling generally well otherwise, reports 4/10 aching right flank discomfort, denies urgency/frequency, dysuria, hematuria, fever/cough/cold, infection or recent abx use.  Scheduled for Right Ureteroscopy, Laser Lithotripsy on 5.16.2023 with Dr. Payton, pending medical clearance.

## 2023-05-12 NOTE — H&P PST ADULT - NSICDXPASTSURGICALHX_GEN_ALL_CORE_FT
PAST SURGICAL HISTORY:  History of broken nose repaired    History of removal of ureteral stent     S/P cholecystectomy 2011    S/P ureteral stent placement      PAST SURGICAL HISTORY:  History of broken nose repaired    History of removal of calculus of renal pelvis through percutaneous nephrostomy     History of removal of ureteral stent     S/P cholecystectomy 2011    S/P ureteral stent placement

## 2023-05-12 NOTE — H&P PST ADULT - PROBLEM SELECTOR PLAN 2
HgA1c pending  FS ordered for DOS  pending medical clearance  aware to HOLD all diabetic medications day of procedure, verbalized understandings  Written & verbal instructions provided to pt for all education/instructions, questions encouraged/addressed, pt verbalized understandings of all education/instructions, teach back method utilized

## 2023-05-12 NOTE — H&P PST ADULT - MUSCULOSKELETAL
negative no calf tenderness/erythema/edema/normal/ROM intact/no calf tenderness/normal gait/strength 5/5 bilateral upper extremities/strength 5/5 bilateral lower extremities

## 2023-05-13 LAB
CULTURE RESULTS: SIGNIFICANT CHANGE UP
SPECIMEN SOURCE: SIGNIFICANT CHANGE UP

## 2023-05-15 ENCOUNTER — TRANSCRIPTION ENCOUNTER (OUTPATIENT)
Age: 33
End: 2023-05-15

## 2023-05-16 ENCOUNTER — OUTPATIENT (OUTPATIENT)
Dept: OUTPATIENT SERVICES | Facility: HOSPITAL | Age: 33
LOS: 1 days | End: 2023-05-16
Payer: COMMERCIAL

## 2023-05-16 ENCOUNTER — RESULT REVIEW (OUTPATIENT)
Age: 33
End: 2023-05-16

## 2023-05-16 ENCOUNTER — TRANSCRIPTION ENCOUNTER (OUTPATIENT)
Age: 33
End: 2023-05-16

## 2023-05-16 ENCOUNTER — APPOINTMENT (OUTPATIENT)
Dept: UROLOGY | Facility: HOSPITAL | Age: 33
End: 2023-05-16

## 2023-05-16 VITALS
SYSTOLIC BLOOD PRESSURE: 145 MMHG | OXYGEN SATURATION: 94 % | DIASTOLIC BLOOD PRESSURE: 91 MMHG | RESPIRATION RATE: 15 BRPM | TEMPERATURE: 97 F | HEART RATE: 89 BPM

## 2023-05-16 VITALS
SYSTOLIC BLOOD PRESSURE: 131 MMHG | DIASTOLIC BLOOD PRESSURE: 79 MMHG | TEMPERATURE: 98 F | HEART RATE: 98 BPM | OXYGEN SATURATION: 97 % | HEIGHT: 67 IN | WEIGHT: 315 LBS | RESPIRATION RATE: 16 BRPM

## 2023-05-16 DIAGNOSIS — Z87.81 PERSONAL HISTORY OF (HEALED) TRAUMATIC FRACTURE: Chronic | ICD-10-CM

## 2023-05-16 DIAGNOSIS — N20.0 CALCULUS OF KIDNEY: ICD-10-CM

## 2023-05-16 DIAGNOSIS — Z90.49 ACQUIRED ABSENCE OF OTHER SPECIFIED PARTS OF DIGESTIVE TRACT: Chronic | ICD-10-CM

## 2023-05-16 DIAGNOSIS — Z98.890 OTHER SPECIFIED POSTPROCEDURAL STATES: Chronic | ICD-10-CM

## 2023-05-16 DIAGNOSIS — Z96.0 PRESENCE OF UROGENITAL IMPLANTS: Chronic | ICD-10-CM

## 2023-05-16 LAB
ANION GAP SERPL CALC-SCNC: 18 MMOL/L — HIGH (ref 5–17)
BUN SERPL-MCNC: 9.7 MG/DL — SIGNIFICANT CHANGE UP (ref 8–20)
CALCIUM SERPL-MCNC: 9.3 MG/DL — SIGNIFICANT CHANGE UP (ref 8.4–10.5)
CHLORIDE SERPL-SCNC: 96 MMOL/L — SIGNIFICANT CHANGE UP (ref 96–108)
CO2 SERPL-SCNC: 21 MMOL/L — LOW (ref 22–29)
CREAT SERPL-MCNC: 0.53 MG/DL — SIGNIFICANT CHANGE UP (ref 0.5–1.3)
EGFR: 136 ML/MIN/1.73M2 — SIGNIFICANT CHANGE UP
GLUCOSE BLDC GLUCOMTR-MCNC: 194 MG/DL — HIGH (ref 70–99)
GLUCOSE BLDC GLUCOMTR-MCNC: 208 MG/DL — HIGH (ref 70–99)
GLUCOSE BLDC GLUCOMTR-MCNC: 216 MG/DL — HIGH (ref 70–99)
GLUCOSE SERPL-MCNC: 223 MG/DL — HIGH (ref 70–99)
POTASSIUM SERPL-MCNC: 4.7 MMOL/L — SIGNIFICANT CHANGE UP (ref 3.5–5.3)
POTASSIUM SERPL-SCNC: 4.7 MMOL/L — SIGNIFICANT CHANGE UP (ref 3.5–5.3)
SODIUM SERPL-SCNC: 135 MMOL/L — SIGNIFICANT CHANGE UP (ref 135–145)

## 2023-05-16 PROCEDURE — 52352 CYSTOURETERO W/STONE REMOVE: CPT | Mod: RT,59,58

## 2023-05-16 PROCEDURE — 52356 CYSTO/URETERO W/LITHOTRIPSY: CPT | Mod: RT,58

## 2023-05-16 PROCEDURE — 88300 SURGICAL PATH GROSS: CPT

## 2023-05-16 PROCEDURE — 88300 SURGICAL PATH GROSS: CPT | Mod: 26

## 2023-05-16 PROCEDURE — 82962 GLUCOSE BLOOD TEST: CPT

## 2023-05-16 PROCEDURE — C1758: CPT

## 2023-05-16 PROCEDURE — C2617: CPT

## 2023-05-16 PROCEDURE — 52356 CYSTO/URETERO W/LITHOTRIPSY: CPT | Mod: RT

## 2023-05-16 PROCEDURE — C1769: CPT

## 2023-05-16 PROCEDURE — 80048 BASIC METABOLIC PNL TOTAL CA: CPT

## 2023-05-16 PROCEDURE — 74420 UROGRAPHY RTRGR +-KUB: CPT | Mod: 26

## 2023-05-16 PROCEDURE — C1889: CPT

## 2023-05-16 PROCEDURE — 36415 COLL VENOUS BLD VENIPUNCTURE: CPT

## 2023-05-16 PROCEDURE — 82365 CALCULUS SPECTROSCOPY: CPT

## 2023-05-16 DEVICE — GUIDEWIRE SENSOR DUAL-FLEX NITINOL STRAIGHT .035" X 150CM: Type: IMPLANTABLE DEVICE | Site: RIGHT | Status: FUNCTIONAL

## 2023-05-16 DEVICE — STONE BASKET ZEROTIP NITINOL 4-WIRE 1.9FR 120CM X 12MM: Type: IMPLANTABLE DEVICE | Site: RIGHT | Status: FUNCTIONAL

## 2023-05-16 DEVICE — LASER FIBER SOLTIVE 200 BALL TIP: Type: IMPLANTABLE DEVICE | Site: RIGHT | Status: FUNCTIONAL

## 2023-05-16 DEVICE — URETERAL SHEATH NAVIGATOR HD 12/14FR X 46CM: Type: IMPLANTABLE DEVICE | Site: RIGHT | Status: FUNCTIONAL

## 2023-05-16 DEVICE — URETERAL STENT TRIA SOFT 6FR 28MM: Type: IMPLANTABLE DEVICE | Site: RIGHT | Status: FUNCTIONAL

## 2023-05-16 DEVICE — URETERAL CATH OPEN END FLEXI-TIP 5FR .038" X 70CM: Type: IMPLANTABLE DEVICE | Site: RIGHT | Status: FUNCTIONAL

## 2023-05-16 RX ORDER — ONDANSETRON 8 MG/1
4 TABLET, FILM COATED ORAL ONCE
Refills: 0 | Status: COMPLETED | OUTPATIENT
Start: 2023-05-16 | End: 2023-05-16

## 2023-05-16 RX ORDER — ONDANSETRON 8 MG/1
1 TABLET, FILM COATED ORAL
Qty: 3 | Refills: 0
Start: 2023-05-16 | End: 2023-05-22

## 2023-05-16 RX ORDER — FENTANYL CITRATE 50 UG/ML
25 INJECTION INTRAVENOUS
Refills: 0 | Status: DISCONTINUED | OUTPATIENT
Start: 2023-05-16 | End: 2023-05-16

## 2023-05-16 RX ORDER — TAMSULOSIN HYDROCHLORIDE 0.4 MG/1
1 CAPSULE ORAL
Qty: 15 | Refills: 0
Start: 2023-05-16 | End: 2023-05-30

## 2023-05-16 RX ORDER — IBUPROFEN 200 MG
1 TABLET ORAL
Qty: 0 | Refills: 0 | DISCHARGE

## 2023-05-16 RX ORDER — CEPHALEXIN 500 MG
1 CAPSULE ORAL
Qty: 9 | Refills: 0
Start: 2023-05-16 | End: 2023-05-18

## 2023-05-16 RX ORDER — METFORMIN HYDROCHLORIDE 850 MG/1
1 TABLET ORAL
Qty: 0 | Refills: 0 | DISCHARGE

## 2023-05-16 RX ORDER — ALLOPURINOL 300 MG
1 TABLET ORAL
Qty: 0 | Refills: 0 | DISCHARGE

## 2023-05-16 RX ORDER — SODIUM CHLORIDE 9 MG/ML
3 INJECTION INTRAMUSCULAR; INTRAVENOUS; SUBCUTANEOUS ONCE
Refills: 0 | Status: DISCONTINUED | OUTPATIENT
Start: 2023-05-16 | End: 2023-05-16

## 2023-05-16 RX ORDER — CEFAZOLIN SODIUM 1 G
3000 VIAL (EA) INJECTION ONCE
Refills: 0 | Status: DISCONTINUED | OUTPATIENT
Start: 2023-05-16 | End: 2023-05-16

## 2023-05-16 RX ORDER — HYDROMORPHONE HYDROCHLORIDE 2 MG/ML
0.5 INJECTION INTRAMUSCULAR; INTRAVENOUS; SUBCUTANEOUS
Refills: 0 | Status: DISCONTINUED | OUTPATIENT
Start: 2023-05-16 | End: 2023-05-16

## 2023-05-16 RX ORDER — FLUTICASONE PROPIONATE AND SALMETEROL 50; 250 UG/1; UG/1
1 POWDER ORAL; RESPIRATORY (INHALATION)
Qty: 0 | Refills: 0 | DISCHARGE

## 2023-05-16 RX ORDER — LEVOCETIRIZINE DIHYDROCHLORIDE 0.5 MG/ML
1 SOLUTION ORAL
Qty: 0 | Refills: 0 | DISCHARGE

## 2023-05-16 RX ORDER — ALBUTEROL 90 UG/1
2 AEROSOL, METERED ORAL
Qty: 0 | Refills: 0 | DISCHARGE

## 2023-05-16 RX ADMIN — ONDANSETRON 4 MILLIGRAM(S): 8 TABLET, FILM COATED ORAL at 13:45

## 2023-05-16 NOTE — ASU DISCHARGE PLAN (ADULT/PEDIATRIC) - NS MD DC FALL RISK RISK
For information on Fall & Injury Prevention, visit: [email or web address] OR  [email or web address] OR  https://www.cdc.gov/steadi/patient.html

## 2023-05-16 NOTE — ASU PREOP CHECKLIST - LOOSE TEETH
Action Swetha 4/1/20 12:37PM   Action Taken Fax to [facility]'s Urology to obtain recs/imgs and BX's ASAP     Action Swetha 4/6/20    Action Taken CSS called and spoke with Hope, per Hope recs will be faxed over immediately and images will be pushed.             no

## 2023-05-16 NOTE — BRIEF OPERATIVE NOTE - OPERATION/FINDINGS
right ureteroscopy  stone in the ureter - laser and basket removed  small dust like fragments in the lower calyces flushed  one small stone in the kidney removed with basket  6/28 stent with no string

## 2023-05-16 NOTE — ASU DISCHARGE PLAN (ADULT/PEDIATRIC) - CARE PROVIDER_API CALL
David Payton)  Urology  [address], [address]  [place] [address]  Phone: [phone]  Fax: [phone]  Follow Up Time: 1 week

## 2023-05-16 NOTE — ASU PREOP CHECKLIST - IV STARTED
Subjective:       Patient ID: Muna Cedillo is a 81 y.o. female.    Chief Complaint: Hypertension (f/u); Medication Refill; and Knee Pain (left)    HPI   This patient is new to me. She presents to clinic for med refills. She suffers from HTN and takes losartan 25mg daily. She reports she is feeling great. She reports she did slip on her grandson's toy recently and was having left knee pain---used polar ice, rest, and elevated knee--pain, swelling and bruising have greatly improved.   Vitals:    08/07/17 1248   BP: 132/78   Pulse: 79   Resp: 18   Temp: 98 °F (36.7 °C)     Review of Systems   Constitutional: Negative.  Negative for appetite change, chills, diaphoresis, fatigue and fever.   HENT: Negative.  Negative for facial swelling and trouble swallowing.    Eyes: Negative.  Negative for discharge and redness.   Respiratory: Negative.  Negative for cough and shortness of breath.    Cardiovascular: Negative.  Negative for chest pain and palpitations.   Gastrointestinal: Negative.  Negative for abdominal pain, constipation and diarrhea.   Genitourinary: Negative.  Negative for difficulty urinating and flank pain.   Musculoskeletal: Negative.  Negative for back pain and neck pain.   Skin: Negative.  Negative for rash and wound.   Neurological: Negative.  Negative for dizziness and light-headedness.   Hematological: Negative.    Psychiatric/Behavioral: Negative.  Negative for confusion. The patient is not nervous/anxious.        Past Medical History:   Diagnosis Date    Arthritis     back pain    Breast cancer 1998    was treated with radiation    Hypertension     Shingles      Objective:      Physical Exam   Constitutional: She is oriented to person, place, and time. She appears well-developed and well-nourished.  Non-toxic appearance. She does not have a sickly appearance. She does not appear ill. No distress.   HENT:   Head: Normocephalic and atraumatic.   Right Ear: Hearing and external ear normal.   Left  Ear: Hearing and external ear normal.   Nose: Nose normal.   Mouth/Throat: Mucous membranes are normal.   Eyes: Conjunctivae, EOM and lids are normal. Pupils are equal, round, and reactive to light. Right eye exhibits no discharge. Left eye exhibits no discharge.   Neck: Trachea normal and normal range of motion. Neck supple. No JVD present. No tracheal deviation present.   Cardiovascular: Normal rate, regular rhythm, S1 normal, S2 normal and normal heart sounds.   No extrasystoles are present. Exam reveals no gallop, no S3, no S4, no distant heart sounds and no friction rub.    No murmur heard.  Pulses:       Dorsalis pedis pulses are 2+ on the left side.   Negative for lower extremity edema   Pulmonary/Chest: Effort normal and breath sounds normal. No accessory muscle usage. No respiratory distress. She has no decreased breath sounds. She has no wheezes. She has no rhonchi. She has no rales. She exhibits no tenderness.   Abdominal: Soft. Normal appearance and bowel sounds are normal. She exhibits no distension. There is no tenderness. There is no rigidity, no rebound, no guarding and no CVA tenderness.   Musculoskeletal: Normal range of motion. She exhibits no edema or deformity.   Neurological: She is alert and oriented to person, place, and time. She has normal strength. No sensory deficit. Coordination and gait normal.   Skin: Skin is warm, dry and intact. No lesion and no rash noted. She is not diaphoretic. No erythema. No pallor.   Psychiatric: She has a normal mood and affect. Her speech is normal and behavior is normal. Judgment and thought content normal. Cognition and memory are normal.   Nursing note and vitals reviewed.      Assessment:       1. Fall, subsequent encounter    2. Essential hypertension    3. Myalgia         Plan:       Myalgia   Fall, subsequent encounter  -     Vitamin D; Future; Expected date: 08/07/2017  - YEHUDA    Essential hypertension  -     CBC auto differential; Future; Expected  date: 08/07/2017  -     Comprehensive metabolic panel; Future; Expected date: 08/07/2017  -     Lipid panel; Future; Expected date: 08/07/2017  -     TSH; Future; Expected date: 08/07/2017  -     Vitamin D; Future; Expected date: 08/07/2017  -     losartan (COZAAR) 25 MG tablet; Take 1 tablet (25 mg total) by mouth once daily.  Dispense: 90 tablet; Refill: 1        Schedule appointment with PCP   Return if symptoms worsen or fail to improve.   yes

## 2023-05-19 PROBLEM — G47.33 OBSTRUCTIVE SLEEP APNEA (ADULT) (PEDIATRIC): Chronic | Status: ACTIVE | Noted: 2023-05-12

## 2023-05-19 PROBLEM — N20.0 CALCULUS OF KIDNEY: Chronic | Status: ACTIVE | Noted: 2023-05-12

## 2023-05-22 LAB — SURGICAL PATHOLOGY STUDY: SIGNIFICANT CHANGE UP

## 2023-05-23 LAB
CELL MATERIAL STONE EST-MCNT: SIGNIFICANT CHANGE UP
LABORATORY COMMENT REPORT: SIGNIFICANT CHANGE UP
NIDUS STONE QN: SIGNIFICANT CHANGE UP

## 2023-05-24 ENCOUNTER — APPOINTMENT (OUTPATIENT)
Dept: UROLOGY | Facility: CLINIC | Age: 33
End: 2023-05-24
Payer: COMMERCIAL

## 2023-05-24 PROCEDURE — 52310 CYSTOSCOPY AND TREATMENT: CPT | Mod: 58

## 2023-05-24 NOTE — HISTORY OF PRESENT ILLNESS
[FreeTextEntry1] : \par \par cysto and stent removal today\par has the 24 hr containers already\par will schedule follow up in 1 month with new ultrasound

## 2023-06-21 ENCOUNTER — APPOINTMENT (OUTPATIENT)
Dept: UROLOGY | Facility: CLINIC | Age: 33
End: 2023-06-21

## 2023-07-11 ENCOUNTER — OUTPATIENT (OUTPATIENT)
Dept: OUTPATIENT SERVICES | Facility: HOSPITAL | Age: 33
LOS: 1 days | End: 2023-07-11
Payer: COMMERCIAL

## 2023-07-11 ENCOUNTER — APPOINTMENT (OUTPATIENT)
Dept: ULTRASOUND IMAGING | Facility: CLINIC | Age: 33
End: 2023-07-11
Payer: COMMERCIAL

## 2023-07-11 DIAGNOSIS — Z98.890 OTHER SPECIFIED POSTPROCEDURAL STATES: Chronic | ICD-10-CM

## 2023-07-11 DIAGNOSIS — N20.9 URINARY CALCULUS, UNSPECIFIED: ICD-10-CM

## 2023-07-11 DIAGNOSIS — Z87.81 PERSONAL HISTORY OF (HEALED) TRAUMATIC FRACTURE: Chronic | ICD-10-CM

## 2023-07-11 DIAGNOSIS — Z90.49 ACQUIRED ABSENCE OF OTHER SPECIFIED PARTS OF DIGESTIVE TRACT: Chronic | ICD-10-CM

## 2023-07-11 DIAGNOSIS — Z96.0 PRESENCE OF UROGENITAL IMPLANTS: Chronic | ICD-10-CM

## 2023-07-11 PROCEDURE — 76770 US EXAM ABDO BACK WALL COMP: CPT

## 2023-07-11 PROCEDURE — 76770 US EXAM ABDO BACK WALL COMP: CPT | Mod: 26

## 2023-07-12 ENCOUNTER — APPOINTMENT (OUTPATIENT)
Dept: UROLOGY | Facility: CLINIC | Age: 33
End: 2023-07-12
Payer: COMMERCIAL

## 2023-07-12 PROCEDURE — 99442: CPT

## 2023-07-12 NOTE — ASSESSMENT
[FreeTextEntry1] : \par \par plan:\par - continue potassium citrate\par - low dose sodium bicarbonate \par - still needs 24 hr urine\par - TTM in 1 month to discuss 24 hr urine\par - will discuss follow up based on that

## 2023-07-12 NOTE — HISTORY OF PRESENT ILLNESS
[FreeTextEntry1] : 33 yo M\par see previous notes\par \par \par reviewed ultrasound: suspected 3 stones in the right kidney, 4+5+7 mm\par discussed with patient\par reviewed recent CT 04/2023\par he had ureteroscopy after\par unlikely the patient has those stones now\par discussed the option fo CT or follow up and recommended for follow up\par patient agrees\par \par \par plan:\par - continue potassium citrate\par - low dose sodium bicarbonate \par - still needs 24 hr urine\par - TTM in 1 month to discuss 24 hr urine\par - will discuss follow up based on that

## 2023-08-02 ENCOUNTER — APPOINTMENT (OUTPATIENT)
Dept: UROLOGY | Facility: CLINIC | Age: 33
End: 2023-08-02

## 2023-09-18 ENCOUNTER — RX RENEWAL (OUTPATIENT)
Age: 33
End: 2023-09-18

## 2023-10-02 NOTE — H&P PST ADULT - EYES
Pt has a fistula in left arm, reports she is supposed to have dialysis this morning. Pt c/o slight sob with cp.       Haylee France, RN  10/02/23 213 [facility], RN  10/02/23 0511 PERRL/EOMI/conjunctiva clear/normal

## 2023-10-11 ENCOUNTER — APPOINTMENT (OUTPATIENT)
Dept: UROLOGY | Facility: CLINIC | Age: 33
End: 2023-10-11

## 2023-10-27 ENCOUNTER — NON-APPOINTMENT (OUTPATIENT)
Age: 33
End: 2023-10-27

## 2023-11-02 ENCOUNTER — EMERGENCY (EMERGENCY)
Facility: HOSPITAL | Age: 33
LOS: 1 days | Discharge: HOME | End: 2023-11-02
Attending: EMERGENCY MEDICINE
Payer: COMMERCIAL

## 2023-11-02 VITALS
HEART RATE: 103 BPM | SYSTOLIC BLOOD PRESSURE: 143 MMHG | OXYGEN SATURATION: 99 % | RESPIRATION RATE: 18 BRPM | DIASTOLIC BLOOD PRESSURE: 90 MMHG

## 2023-11-02 VITALS
TEMPERATURE: 98 F | SYSTOLIC BLOOD PRESSURE: 144 MMHG | OXYGEN SATURATION: 99 % | WEIGHT: 315 LBS | DIASTOLIC BLOOD PRESSURE: 102 MMHG | RESPIRATION RATE: 18 BRPM | HEART RATE: 115 BPM

## 2023-11-02 DIAGNOSIS — Z90.49 ACQUIRED ABSENCE OF OTHER SPECIFIED PARTS OF DIGESTIVE TRACT: Chronic | ICD-10-CM

## 2023-11-02 DIAGNOSIS — Z98.890 OTHER SPECIFIED POSTPROCEDURAL STATES: Chronic | ICD-10-CM

## 2023-11-02 DIAGNOSIS — Z87.81 PERSONAL HISTORY OF (HEALED) TRAUMATIC FRACTURE: Chronic | ICD-10-CM

## 2023-11-02 DIAGNOSIS — Z96.0 PRESENCE OF UROGENITAL IMPLANTS: Chronic | ICD-10-CM

## 2023-11-02 LAB
ALBUMIN SERPL ELPH-MCNC: 3.8 G/DL — SIGNIFICANT CHANGE UP (ref 3.3–5.2)
ALBUMIN SERPL ELPH-MCNC: 3.8 G/DL — SIGNIFICANT CHANGE UP (ref 3.3–5.2)
ALP SERPL-CCNC: 150 U/L — HIGH (ref 40–120)
ALP SERPL-CCNC: 150 U/L — HIGH (ref 40–120)
ALT FLD-CCNC: 49 U/L — HIGH
ALT FLD-CCNC: 49 U/L — HIGH
ANION GAP SERPL CALC-SCNC: 14 MMOL/L — SIGNIFICANT CHANGE UP (ref 5–17)
ANION GAP SERPL CALC-SCNC: 14 MMOL/L — SIGNIFICANT CHANGE UP (ref 5–17)
AST SERPL-CCNC: 46 U/L — HIGH
AST SERPL-CCNC: 46 U/L — HIGH
BASOPHILS # BLD AUTO: 0.08 K/UL — SIGNIFICANT CHANGE UP (ref 0–0.2)
BASOPHILS # BLD AUTO: 0.08 K/UL — SIGNIFICANT CHANGE UP (ref 0–0.2)
BASOPHILS NFR BLD AUTO: 0.9 % — SIGNIFICANT CHANGE UP (ref 0–2)
BASOPHILS NFR BLD AUTO: 0.9 % — SIGNIFICANT CHANGE UP (ref 0–2)
BILIRUB SERPL-MCNC: 0.4 MG/DL — SIGNIFICANT CHANGE UP (ref 0.4–2)
BILIRUB SERPL-MCNC: 0.4 MG/DL — SIGNIFICANT CHANGE UP (ref 0.4–2)
BUN SERPL-MCNC: 15.1 MG/DL — SIGNIFICANT CHANGE UP (ref 8–20)
BUN SERPL-MCNC: 15.1 MG/DL — SIGNIFICANT CHANGE UP (ref 8–20)
CALCIUM SERPL-MCNC: 9.2 MG/DL — SIGNIFICANT CHANGE UP (ref 8.4–10.5)
CALCIUM SERPL-MCNC: 9.2 MG/DL — SIGNIFICANT CHANGE UP (ref 8.4–10.5)
CHLORIDE SERPL-SCNC: 90 MMOL/L — LOW (ref 96–108)
CHLORIDE SERPL-SCNC: 90 MMOL/L — LOW (ref 96–108)
CO2 SERPL-SCNC: 26 MMOL/L — SIGNIFICANT CHANGE UP (ref 22–29)
CO2 SERPL-SCNC: 26 MMOL/L — SIGNIFICANT CHANGE UP (ref 22–29)
CREAT SERPL-MCNC: 0.57 MG/DL — SIGNIFICANT CHANGE UP (ref 0.5–1.3)
CREAT SERPL-MCNC: 0.57 MG/DL — SIGNIFICANT CHANGE UP (ref 0.5–1.3)
EGFR: 132 ML/MIN/1.73M2 — SIGNIFICANT CHANGE UP
EGFR: 132 ML/MIN/1.73M2 — SIGNIFICANT CHANGE UP
EOSINOPHIL # BLD AUTO: 0.19 K/UL — SIGNIFICANT CHANGE UP (ref 0–0.5)
EOSINOPHIL # BLD AUTO: 0.19 K/UL — SIGNIFICANT CHANGE UP (ref 0–0.5)
EOSINOPHIL NFR BLD AUTO: 2 % — SIGNIFICANT CHANGE UP (ref 0–6)
EOSINOPHIL NFR BLD AUTO: 2 % — SIGNIFICANT CHANGE UP (ref 0–6)
GLUCOSE SERPL-MCNC: 365 MG/DL — HIGH (ref 70–99)
GLUCOSE SERPL-MCNC: 365 MG/DL — HIGH (ref 70–99)
HCT VFR BLD CALC: 42 % — SIGNIFICANT CHANGE UP (ref 39–50)
HCT VFR BLD CALC: 42 % — SIGNIFICANT CHANGE UP (ref 39–50)
HGB BLD-MCNC: 14.6 G/DL — SIGNIFICANT CHANGE UP (ref 13–17)
HGB BLD-MCNC: 14.6 G/DL — SIGNIFICANT CHANGE UP (ref 13–17)
IMM GRANULOCYTES NFR BLD AUTO: 0.7 % — SIGNIFICANT CHANGE UP (ref 0–0.9)
IMM GRANULOCYTES NFR BLD AUTO: 0.7 % — SIGNIFICANT CHANGE UP (ref 0–0.9)
LIDOCAIN IGE QN: 42 U/L — SIGNIFICANT CHANGE UP (ref 22–51)
LIDOCAIN IGE QN: 42 U/L — SIGNIFICANT CHANGE UP (ref 22–51)
LYMPHOCYTES # BLD AUTO: 2.69 K/UL — SIGNIFICANT CHANGE UP (ref 1–3.3)
LYMPHOCYTES # BLD AUTO: 2.69 K/UL — SIGNIFICANT CHANGE UP (ref 1–3.3)
LYMPHOCYTES # BLD AUTO: 28.7 % — SIGNIFICANT CHANGE UP (ref 13–44)
LYMPHOCYTES # BLD AUTO: 28.7 % — SIGNIFICANT CHANGE UP (ref 13–44)
MCHC RBC-ENTMCNC: 28.6 PG — SIGNIFICANT CHANGE UP (ref 27–34)
MCHC RBC-ENTMCNC: 28.6 PG — SIGNIFICANT CHANGE UP (ref 27–34)
MCHC RBC-ENTMCNC: 34.8 GM/DL — SIGNIFICANT CHANGE UP (ref 32–36)
MCHC RBC-ENTMCNC: 34.8 GM/DL — SIGNIFICANT CHANGE UP (ref 32–36)
MCV RBC AUTO: 82.4 FL — SIGNIFICANT CHANGE UP (ref 80–100)
MCV RBC AUTO: 82.4 FL — SIGNIFICANT CHANGE UP (ref 80–100)
MONOCYTES # BLD AUTO: 0.52 K/UL — SIGNIFICANT CHANGE UP (ref 0–0.9)
MONOCYTES # BLD AUTO: 0.52 K/UL — SIGNIFICANT CHANGE UP (ref 0–0.9)
MONOCYTES NFR BLD AUTO: 5.5 % — SIGNIFICANT CHANGE UP (ref 2–14)
MONOCYTES NFR BLD AUTO: 5.5 % — SIGNIFICANT CHANGE UP (ref 2–14)
NEUTROPHILS # BLD AUTO: 5.83 K/UL — SIGNIFICANT CHANGE UP (ref 1.8–7.4)
NEUTROPHILS # BLD AUTO: 5.83 K/UL — SIGNIFICANT CHANGE UP (ref 1.8–7.4)
NEUTROPHILS NFR BLD AUTO: 62.2 % — SIGNIFICANT CHANGE UP (ref 43–77)
NEUTROPHILS NFR BLD AUTO: 62.2 % — SIGNIFICANT CHANGE UP (ref 43–77)
PLATELET # BLD AUTO: 218 K/UL — SIGNIFICANT CHANGE UP (ref 150–400)
PLATELET # BLD AUTO: 218 K/UL — SIGNIFICANT CHANGE UP (ref 150–400)
POTASSIUM SERPL-MCNC: 4.5 MMOL/L — SIGNIFICANT CHANGE UP (ref 3.5–5.3)
POTASSIUM SERPL-MCNC: 4.5 MMOL/L — SIGNIFICANT CHANGE UP (ref 3.5–5.3)
POTASSIUM SERPL-SCNC: 4.5 MMOL/L — SIGNIFICANT CHANGE UP (ref 3.5–5.3)
POTASSIUM SERPL-SCNC: 4.5 MMOL/L — SIGNIFICANT CHANGE UP (ref 3.5–5.3)
PROT SERPL-MCNC: 6.8 G/DL — SIGNIFICANT CHANGE UP (ref 6.6–8.7)
PROT SERPL-MCNC: 6.8 G/DL — SIGNIFICANT CHANGE UP (ref 6.6–8.7)
RBC # BLD: 5.1 M/UL — SIGNIFICANT CHANGE UP (ref 4.2–5.8)
RBC # BLD: 5.1 M/UL — SIGNIFICANT CHANGE UP (ref 4.2–5.8)
RBC # FLD: 13.9 % — SIGNIFICANT CHANGE UP (ref 10.3–14.5)
RBC # FLD: 13.9 % — SIGNIFICANT CHANGE UP (ref 10.3–14.5)
SODIUM SERPL-SCNC: 130 MMOL/L — LOW (ref 135–145)
SODIUM SERPL-SCNC: 130 MMOL/L — LOW (ref 135–145)
TROPONIN T SERPL-MCNC: <0.01 NG/ML — SIGNIFICANT CHANGE UP (ref 0–0.06)
TROPONIN T SERPL-MCNC: <0.01 NG/ML — SIGNIFICANT CHANGE UP (ref 0–0.06)
WBC # BLD: 9.38 K/UL — SIGNIFICANT CHANGE UP (ref 3.8–10.5)
WBC # BLD: 9.38 K/UL — SIGNIFICANT CHANGE UP (ref 3.8–10.5)
WBC # FLD AUTO: 9.38 K/UL — SIGNIFICANT CHANGE UP (ref 3.8–10.5)
WBC # FLD AUTO: 9.38 K/UL — SIGNIFICANT CHANGE UP (ref 3.8–10.5)

## 2023-11-02 PROCEDURE — 85025 COMPLETE CBC W/AUTO DIFF WBC: CPT

## 2023-11-02 PROCEDURE — 83690 ASSAY OF LIPASE: CPT

## 2023-11-02 PROCEDURE — 80053 COMPREHEN METABOLIC PANEL: CPT

## 2023-11-02 PROCEDURE — 99285 EMERGENCY DEPT VISIT HI MDM: CPT

## 2023-11-02 PROCEDURE — 93005 ELECTROCARDIOGRAM TRACING: CPT

## 2023-11-02 PROCEDURE — 36415 COLL VENOUS BLD VENIPUNCTURE: CPT

## 2023-11-02 PROCEDURE — 84484 ASSAY OF TROPONIN QUANT: CPT

## 2023-11-02 PROCEDURE — 93010 ELECTROCARDIOGRAM REPORT: CPT

## 2023-11-02 PROCEDURE — 99284 EMERGENCY DEPT VISIT MOD MDM: CPT | Mod: 25

## 2023-11-02 RX ORDER — LIDOCAINE 4 G/100G
10 CREAM TOPICAL ONCE
Refills: 0 | Status: COMPLETED | OUTPATIENT
Start: 2023-11-02 | End: 2023-11-02

## 2023-11-02 RX ORDER — SODIUM CHLORIDE 9 MG/ML
1000 INJECTION INTRAMUSCULAR; INTRAVENOUS; SUBCUTANEOUS ONCE
Refills: 0 | Status: COMPLETED | OUTPATIENT
Start: 2023-11-02 | End: 2023-11-02

## 2023-11-02 RX ORDER — FAMOTIDINE 10 MG/ML
40 INJECTION INTRAVENOUS ONCE
Refills: 0 | Status: COMPLETED | OUTPATIENT
Start: 2023-11-02 | End: 2023-11-02

## 2023-11-02 RX ORDER — FAMOTIDINE 10 MG/ML
1 INJECTION INTRAVENOUS
Qty: 7 | Refills: 0
Start: 2023-11-02 | End: 2023-11-08

## 2023-11-02 RX ADMIN — LIDOCAINE 10 MILLILITER(S): 4 CREAM TOPICAL at 05:43

## 2023-11-02 RX ADMIN — Medication 30 MILLILITER(S): at 05:43

## 2023-11-02 RX ADMIN — FAMOTIDINE 40 MILLIGRAM(S): 10 INJECTION INTRAVENOUS at 05:43

## 2023-11-02 RX ADMIN — SODIUM CHLORIDE 1000 MILLILITER(S): 9 INJECTION INTRAMUSCULAR; INTRAVENOUS; SUBCUTANEOUS at 05:47

## 2023-11-02 NOTE — ED PROVIDER NOTE - CLINICAL SUMMARY MEDICAL DECISION MAKING FREE TEXT BOX
33 year old male PMHx obesity, ARACELIS, DM, cholecystectomy present to ED for epigastic/chest discomfort. pt reports friday he was lifting and moving boxes for work and at home, noticed left sided chest muscle soreness. Pt reports last night at 2230 he noticed mid chest tightness that is reproducible to touch. Concurrently pt reports epigastric feeling of fullness, burning that got worse after eating. pt took tums and omperazole without relief. pt denies diaphoresis, palpitations, lightheadedness, numbness, weakness radiating chest pains, back pain, SOB, difficulty breathing diarrhea, nausea, vomiting.   Labs, Meds, IVf, re-assess  EKG sinus tach 33 year old male PMHx obesity, ARACELIS, DM, cholecystectomy present to ED for epigastic/chest discomfort. pt reports friday he was lifting and moving boxes for work and at home, noticed left sided chest muscle soreness. Pt reports last night at 2230 he noticed mid chest tightness that is reproducible to touch. Concurrently pt reports epigastric feeling of fullness, burning that got worse after eating. pt took tums and omperazole without relief. pt denies diaphoresis, palpitations, lightheadedness, numbness, weakness radiating chest pains, back pain, SOB, difficulty breathing diarrhea, nausea, vomiting.   Labs, Meds, IVf, re-assess  EKG sinus tach  Discussed elevated blood sugar with patient, pt on medication, set to have appt with primary care, encourage to follow up with endocrine    PT educated about need to follow up to primary care/GI/endo for further tx, will dc home with PO meds, educated about when to return to the ED if needed. PT verbalizes that he/she understands all instructions and results. Pt informed that Emergency Department is open and available 24/7 365 days a year, encouraged to return to the ED if they have any change in condition, or feel the need for revaluation.

## 2023-11-02 NOTE — ED PROVIDER NOTE - ATTENDING APP SHARED VISIT CONTRIBUTION OF CARE
I agree with the PA's note and was available for any issues/concerns. I was directly involved in patient care. My brief overall assessment is as follows:     Pt with reproducible chest wall ttp after moving some boxes. also sx of gastritis/reflux pain worse with eating. does have FH CAD, patient is obese with DM/HTN so concerned. EKG no ischemia. pain not exertional no SOB/diaphoresis/nausesa. labs with negative trop no concern for ACS. hyperglycemia with expected hyponatremia, given 1L fluids encouraged to Follow up with endo/PCP regarding sugars. patient has apt this month. resolution in symptoms. outpt Follow up

## 2023-11-02 NOTE — ED ADULT TRIAGE NOTE - CHIEF COMPLAINT QUOTE
Pt walks in for triage initially complaining of chest pain, EKG obtained. Upon assessment pt endorses pain as indigestion and being unable to sleep comfortably while laying down flat, and describes it as chest tenderness with palpation.

## 2023-11-02 NOTE — ED ADULT NURSE NOTE - FINAL NURSING ELECTRONIC SIGNATURE
Urogram with visualization of contrast from the lower pole calyx to the bladder. No obstruction visualized. Three core biopsies taken. 23 cm 14 Nigerian Hugo split catheter placed using fluoroscopic guidance.  Tip in SVC.  MAy use immediately.  Full report to follow. 02-Nov-2023 06:33

## 2023-11-02 NOTE — ED PROVIDER NOTE - OBJECTIVE STATEMENT
33 year old male PMHx obesity, ARACELIS, DM, cholecystectomy present to ED for epigastic/chest discomfort. pt reports friday he was lifting and moving boxes for work and at home, noticed left sided chest muscle soreness. Pt reports last night at 2230 he noticed mid chest tightness that is reproducible to touch. Concurrently pt reports epigastric feeling of fullness, burning that got worse after eating. pt took tums and omperazole without relief. Pt expresses cardiac concern due to PMHx DM/Obesity and FMhx of cardiac disease. pt denies diaphoresis, palpitations, lightheadedness, numbness, weakness radiating chest pains, back pain, SOB, difficulty breathing diarrhea, nausea, vomiting.

## 2023-11-02 NOTE — ED ADULT NURSE NOTE - OBJECTIVE STATEMENT
Patient presents to ED c/o left sided chest discomfort that started after lifting boxes at work.  Patient states that pain is reproducible.  Denies sob, n/v/d, fever chills.  No further complaints at this time.

## 2023-11-02 NOTE — ED PROVIDER NOTE - PATIENT PORTAL LINK FT
NSTEMI    Suggest:    Cardiac monitor  O2 supplement  Follow up Cardiac enzymes  Treat with aspirin, Plavix  IV Heparin  Beta blockers  Statins  Nitrates PRN  Echocardiogram to evaluate EF  Ischemia evaluation - Based on pt's symptoms, history, risk factors, exam, lab and EKG data I have advised pt have a cardiac catheterization and possible percutaneous coronary intervention. Procedure, its risks, alternatives, benefits and potential complications were discussed in detail. Risks include but not limited to bleeding, infection, allergy, renal failure requiring dialysis, stroke, vascular injury, pericardial tamponade, arrhythmias, MI and even death. Pt is agreeable and has consented for the procedure and the procedure is being scheduled.   Get chest Xray  Limit IV fluids  One dose Lasix. You can access the FollowMyHealth Patient Portal offered by [facility] by registering at the following website: [email or web address]. By joining [facility]’s FollowMyHealth portal, you will also be able to view your health information using other applications (apps) compatible with our system.

## 2023-11-02 NOTE — ED PROVIDER NOTE - CARE PROVIDER_API CALL
Amos Melendez  Gastroenterology  [address], [facility] [facility] [address] [facility] [address]  [place] [address]  Phone: [phone]  Fax: [phone]  Follow Up Time:

## 2023-11-02 NOTE — ED ADULT NURSE NOTE - NSICDXPASTSURGICALHX_GEN_ALL_CORE_FT
PAST SURGICAL HISTORY:  History of broken nose repaired    History of removal of calculus of renal pelvis through percutaneous nephrostomy     History of removal of ureteral stent     S/P cholecystectomy 2011    S/P ureteral stent placement

## 2023-11-14 ENCOUNTER — RX RENEWAL (OUTPATIENT)
Age: 33
End: 2023-11-14

## 2023-11-22 ENCOUNTER — RX RENEWAL (OUTPATIENT)
Age: 33
End: 2023-11-22

## 2023-12-09 ENCOUNTER — NON-APPOINTMENT (OUTPATIENT)
Age: 33
End: 2023-12-09

## 2023-12-29 ENCOUNTER — APPOINTMENT (OUTPATIENT)
Dept: ULTRASOUND IMAGING | Facility: CLINIC | Age: 33
End: 2023-12-29

## 2024-01-02 ENCOUNTER — RX RENEWAL (OUTPATIENT)
Age: 34
End: 2024-01-02

## 2024-01-10 ENCOUNTER — APPOINTMENT (OUTPATIENT)
Dept: CARDIOLOGY | Facility: CLINIC | Age: 34
End: 2024-01-10

## 2024-01-24 ENCOUNTER — NON-APPOINTMENT (OUTPATIENT)
Age: 34
End: 2024-01-24

## 2024-01-24 ENCOUNTER — APPOINTMENT (OUTPATIENT)
Dept: CARDIOLOGY | Facility: CLINIC | Age: 34
End: 2024-01-24
Payer: COMMERCIAL

## 2024-01-24 VITALS
DIASTOLIC BLOOD PRESSURE: 85 MMHG | SYSTOLIC BLOOD PRESSURE: 181 MMHG | HEART RATE: 97 BPM | WEIGHT: 315 LBS | OXYGEN SATURATION: 98 % | BODY MASS INDEX: 47.9 KG/M2

## 2024-01-24 DIAGNOSIS — R60.0 LOCALIZED EDEMA: ICD-10-CM

## 2024-01-24 PROCEDURE — 99205 OFFICE O/P NEW HI 60 MIN: CPT | Mod: 25

## 2024-01-24 PROCEDURE — 93000 ELECTROCARDIOGRAM COMPLETE: CPT

## 2024-01-24 RX ORDER — LISINOPRIL 5 MG/1
5 TABLET ORAL DAILY
Qty: 30 | Refills: 5 | Status: ACTIVE | COMMUNITY
Start: 2024-01-24 | End: 1900-01-01

## 2024-01-25 LAB
ALBUMIN SERPL ELPH-MCNC: 4.4 G/DL
ALP BLD-CCNC: 127 U/L
ALT SERPL-CCNC: 47 U/L
ANION GAP SERPL CALC-SCNC: 17 MMOL/L
AST SERPL-CCNC: 35 U/L
BILIRUB SERPL-MCNC: 0.4 MG/DL
BUN SERPL-MCNC: 11 MG/DL
CALCIUM SERPL-MCNC: 9.4 MG/DL
CHLORIDE SERPL-SCNC: 94 MMOL/L
CHOLEST SERPL-MCNC: 301 MG/DL
CO2 SERPL-SCNC: 23 MMOL/L
CREAT SERPL-MCNC: 0.59 MG/DL
EGFR: 131 ML/MIN/1.73M2
ESTIMATED AVERAGE GLUCOSE: 220 MG/DL
GLUCOSE SERPL-MCNC: 213 MG/DL
HBA1C MFR BLD HPLC: 9.3 %
HCT VFR BLD CALC: 44.9 %
HDLC SERPL-MCNC: 35 MG/DL
HGB BLD-MCNC: 15 G/DL
LDLC SERPL CALC-MCNC: NORMAL MG/DL
MCHC RBC-ENTMCNC: 27.7 PG
MCHC RBC-ENTMCNC: 33.4 GM/DL
MCV RBC AUTO: 83 FL
NONHDLC SERPL-MCNC: 266 MG/DL
NT-PROBNP SERPL-MCNC: <36 PG/ML
PLATELET # BLD AUTO: 219 K/UL
POTASSIUM SERPL-SCNC: 4.1 MMOL/L
PROT SERPL-MCNC: 7.2 G/DL
RBC # BLD: 5.41 M/UL
RBC # FLD: 13.7 %
SODIUM SERPL-SCNC: 134 MMOL/L
TRIGL SERPL-MCNC: 1437 MG/DL
TSH SERPL-ACNC: 1.48 UIU/ML
WBC # FLD AUTO: 9.32 K/UL

## 2024-01-26 ENCOUNTER — LABORATORY RESULT (OUTPATIENT)
Age: 34
End: 2024-01-26

## 2024-01-26 ENCOUNTER — APPOINTMENT (OUTPATIENT)
Dept: PULMONOLOGY | Facility: CLINIC | Age: 34
End: 2024-01-26
Payer: COMMERCIAL

## 2024-01-26 VITALS
TEMPERATURE: 97.3 F | OXYGEN SATURATION: 97 % | SYSTOLIC BLOOD PRESSURE: 140 MMHG | HEART RATE: 90 BPM | DIASTOLIC BLOOD PRESSURE: 90 MMHG | WEIGHT: 315 LBS | RESPIRATION RATE: 18 BRPM | HEIGHT: 67 IN | BODY MASS INDEX: 49.44 KG/M2

## 2024-01-26 DIAGNOSIS — Z86.16 PERSONAL HISTORY OF COVID-19: ICD-10-CM

## 2024-01-26 DIAGNOSIS — Z87.01 PERSONAL HISTORY OF PNEUMONIA (RECURRENT): ICD-10-CM

## 2024-01-26 DIAGNOSIS — J45.20 MILD INTERMITTENT ASTHMA, UNCOMPLICATED: ICD-10-CM

## 2024-01-26 DIAGNOSIS — R94.2 ABNORMAL RESULTS OF PULMONARY FUNCTION STUDIES: ICD-10-CM

## 2024-01-26 DIAGNOSIS — U07.1 COVID-19: ICD-10-CM

## 2024-01-26 DIAGNOSIS — E11.69 TYPE 2 DIABETES MELLITUS WITH OTHER SPECIFIED COMPLICATION: ICD-10-CM

## 2024-01-26 PROCEDURE — 99204 OFFICE O/P NEW MOD 45 MIN: CPT | Mod: 25

## 2024-01-26 PROCEDURE — 94618 PULMONARY STRESS TESTING: CPT

## 2024-01-26 PROCEDURE — 71046 X-RAY EXAM CHEST 2 VIEWS: CPT

## 2024-01-26 PROCEDURE — 94729 DIFFUSING CAPACITY: CPT

## 2024-01-26 PROCEDURE — 95012 NITRIC OXIDE EXP GAS DETER: CPT

## 2024-01-26 PROCEDURE — 94060 EVALUATION OF WHEEZING: CPT

## 2024-01-26 PROCEDURE — 94727 GAS DIL/WSHOT DETER LNG VOL: CPT

## 2024-01-26 RX ORDER — OXYCODONE HYDROCHLORIDE 5 MG/1
5 CAPSULE ORAL
Qty: 12 | Refills: 0 | Status: DISCONTINUED | COMMUNITY
Start: 2023-03-19 | End: 2024-01-26

## 2024-01-26 RX ORDER — TAMSULOSIN HYDROCHLORIDE 0.4 MG/1
0.4 CAPSULE ORAL
Qty: 30 | Refills: 0 | Status: DISCONTINUED | COMMUNITY
Start: 2022-07-27 | End: 2024-01-26

## 2024-01-26 RX ORDER — TAMSULOSIN HYDROCHLORIDE 0.4 MG/1
0.4 CAPSULE ORAL
Qty: 30 | Refills: 0 | Status: DISCONTINUED | COMMUNITY
Start: 2022-08-26 | End: 2024-01-26

## 2024-01-26 RX ORDER — LEVOCETIRIZINE DIHYDROCHLORIDE 5 MG/1
5 TABLET, FILM COATED ORAL
Refills: 0 | Status: ACTIVE | COMMUNITY

## 2024-01-26 RX ORDER — FAMOTIDINE 40 MG/1
40 TABLET, FILM COATED ORAL
Qty: 90 | Refills: 1 | Status: ACTIVE | COMMUNITY
Start: 2024-01-26 | End: 1900-01-01

## 2024-01-26 RX ORDER — ALBUTEROL SULFATE 90 UG/1
108 (90 BASE) INHALANT RESPIRATORY (INHALATION)
Refills: 0 | Status: ACTIVE | COMMUNITY

## 2024-01-26 RX ORDER — ONDANSETRON 8 MG/1
8 TABLET, ORALLY DISINTEGRATING ORAL
Qty: 15 | Refills: 0 | Status: DISCONTINUED | COMMUNITY
Start: 2022-08-17 | End: 2024-01-26

## 2024-01-26 RX ORDER — IBUPROFEN 600 MG/1
600 TABLET, FILM COATED ORAL 3 TIMES DAILY
Qty: 15 | Refills: 0 | Status: DISCONTINUED | COMMUNITY
Start: 2023-03-19 | End: 2024-01-26

## 2024-01-26 NOTE — ADDENDUM
[FreeTextEntry1] : Documented by Sahara Galindo acting as a scribe for Dr. Rashad Uriarte on 01/26/2024. All medical record entries made by the Scribe were at my, Dr. Rashad Uriarte's, direction and personally dictated by me on 01/26/2024. I have reviewed the chart and agree that the record accurately reflects my personal performance of the history, physical exam, assessment and plan. I have also personally directed, reviewed, and agree with the discharge instructions.

## 2024-01-26 NOTE — PHYSICAL EXAM
[No Acute Distress] : no acute distress [Normal Oropharynx] : normal oropharynx [Normal Appearance] : normal appearance [No Neck Mass] : no neck mass [Normal Rate/Rhythm] : normal rate/rhythm [No Murmurs] : no murmurs [Normal S1, S2] : normal s1, s2 [No Resp Distress] : no resp distress [No Abnormalities] : no abnormalities [Clear to Auscultation Bilaterally] : clear to auscultation bilaterally [Benign] : benign [Normal Gait] : normal gait [No Clubbing] : no clubbing [No Edema] : no edema [No Cyanosis] : no cyanosis [FROM] : FROM [Normal Color/ Pigmentation] : normal color/ pigmentation [No Focal Deficits] : no focal deficits [Oriented x3] : oriented x3 [Normal Affect] : normal affect [III] : Mallampati Class: III [TextBox_2] : OW [TextBox_68] : I:E ratio 1:3; clear

## 2024-01-26 NOTE — HISTORY OF PRESENT ILLNESS
[TextBox_4] : Mr. MCCLELLAN is a 33 year old male with a history of kidney (uric acid) stones, obesity/OW, DM, ARACELIS (2019), PNA 2019 complicated by asthmatic Sx, prior allergies, GERD, ARACELIS, COVID-19 (12/2022) presenting to the office today for an initial pulmonary evaluation. His chief complaint is  -he denies SOB with stairs or inclines -he notes having severe back pain with COVID-19. His chest CT showed "white opacities" on his lungs, which was thought to be COVID-PNA. After COVID, he had seasonal asthma, and he was put on inhalers (Advair, albuterol) by Dr. Abdulkadir Shepard. He hasn't used Advair in 1 year, and he still uses albuterol -he notes allergy Sx: itchy eyes, mild PND. He takes Xyzal -he notes he's had severe heartburn and reflux over the past 1-2 months. He's on Omeprazole 40 mg QAM, with breakthrough every 3-4 days. He believes his Sx began with an increase in the dosage of potassium citrate. He has heartburn-related chest pain that radiates to his armpit. He has a pending ECHO 2/2024 to r/o cardiac symptomatology (Mj Moya) -he notes having mild leg swelling -he notes he's lost over 100 lbs since 2019 -he notes he uses his CPAP every night with the F30i FFM, [id number] machine by [employer]. He's had this machine since 2022.  He was Dx with ARACELIS 2019, and he was originally on a BiPAP. He still has the BiPAP at home. -he notes his libido is good -he denies needing to take daytime naps while using his CPAP -he notes bowels are regular  -he notes his senses of smell and taste are stable  -he notes having joint aches and pains due to standing on his feet ~16 hours per day -he notes having a history of issues with uric acid. He's had 12 kidney procedures due to his kidney stones. He's on Allopurinol. -he notes he's considering going on Mounjaro  -he denies any headaches, nausea, emesis, fever, chills, sweats, chest pain, chest pressure, coughing, wheezing, palpitations, constipation, diarrhea, vertigo, dysphagia, itchy ears, leg pain, or sour taste in the mouth.

## 2024-01-26 NOTE — ASSESSMENT
[FreeTextEntry1] : Mr. MCCLELLAN is a 33 year old male with a history of kidney (uric acid) stones, obesity/OW, DM, ARACELIS (2019), PNA 2019 complicated by asthmatic Sx, prior allergies, GERD, ARACELIS, COVID-19 (12/2022) who now comes to the office for an initial pulmonary evaluation for SOB, asthma, allergies, GERD, ARACELIS.   His shortness of breath is multifactorial due to: -poor mechanics of breathing -out of shape -overweight -pulmonary disease   -asthma -cardiac disease   Problem 1: Asthma -add Advair 250 1 inhalation BID  -add Albuterol via inhaler 2 puffs up to Q6H, pre-exercise PRN  -Asthma is believed to be caused by inherited (genetic) and environmental factor, but its exact cause is unknown. Asthma may be triggered by allergens, lung infections, or irritants in the air. Asthma triggers are different for each person.  -Inhaler technique reviewed as well as oral hygiene techniques reviewed with patient. Avoidance of cold air, extremes of temperature, rescue inhaler should be used before exercise. Order of medication reviewed with patient. Recommended adequate hydration and use of a cool mist humidifier in the bedroom    Problem 2: allergies/sinus -complete blood work to include: asthma panel, food IgE panel, IgE level, eosinophil level, vitamin D level  -add Xyzal 5 mg QHS  -Environmental measures for allergies were encouraged including mattress and pillow covers, air purifier, and environmental controls.    Problem 3: GERD (potassium-citrate related) -add Pepcid 40 mg QHS  -Rule of 2s: avoid eating too much, eating too late, eating too spicy, eating two hours before bed. -Things to avoid including overeating, spicy foods, tight clothing, eating within three hours of bed, this list is not all inclusive. -For treatment of reflux, possible options discussed including diet control, H2 blockers, PPIs, as well as coating motility agents discussed as treatment options. Timing of meals and proximity of last meal to sleep were discussed. If symptoms persist, a formal gastrointestinal evaluation is needed.    Problem 4: ARACELIS (Dx 2019) -CPAP in place F30i FFM small, [id number] machine, 12 cm H2O by [employer]- tolerating well -prior BiPAP -retrieve original BiPAP, reset to CPAP settings -complete blood work to include: free and total testosterone  -Sleep apnea is associated with adverse clinical consequences which can affect most organ systems. Cardiovascular disease risk includes arrhythmias, atrial fibrillation, hypertension, coronary artery disease, and stroke. Metabolic disorders include diabetes type 2, non-alcoholic fatty liver disease. Mood disorder especially depression; and cognitive decline especially in the elderly. Associations with chronic reflux/Barretts esophagus some but not all inclusive. -Reasons include arousal consistent with hypopnea; respiratory events most prominent in REM sleep or supine position; therefore sleep staging and body position are important for accurate diagnosis and estimation of AHI.   Problem 5: cardiac disease -recommended to continue to follow up with Cardiologist (Mj Moya)   Problem 6: poor breathing mechanics -Recommended Wim Hof and Buteyko breathing technique -Proper breathing techniques were reviewed with an emphasis on exhalation. Patient was instructed to breathe in for 1 second and out for 4 seconds. The patient was encouraged to not talk while walking.   Problem 7: overweight/ out of shape -recommended Berberine OTC supplement for visceral fat loss  -Weight loss, exercise, and diet control were discussed and are highly encouraged. Treatment options are given such as aqua therapy, and contacting a nutritionist. Recommended to use the elliptical, stationary bike, less use of the treadmill.   Problem 8: health maintenance - follow-up with Dr. Chirinos (nephrology) -recommended yearly flu shot after October 15, 2023 -recommended strep pneumonia vaccines: Prevnar-20 vaccine, followed by Pneumo vaccine 23 one year following after 65 years old. -recommended early intervention for Upper Respiratory Infections (URIs) -recommended regular osteoporosis evaluations -recommended early dermatological evaluations -recommended after the age of 50 to the age of 70, colonoscopy every 5 years   F/P in 6-8 weeks. He is encouraged to call with any changes, concerns, or questions

## 2024-01-26 NOTE — PROCEDURE
[FreeTextEntry1] : CXR reveals a normal sized heart; no evidence of infiltrate or effusion--a normal appearing chest radiograph   CTPA  (4/2020) revealed enlarged spleen at 16.6 cm; s/p cholecystectomy; nonspecific gg attenuation in the RML  CXR (4/2020)showed subtle increased attenuation in the lower lobes B/L; no appreciable infiltrates  Full PFT reveals mild-moderate restrictive dysfunction; FEV1 was 2.15L which is 57% of predicted, with a 4% change on BD; mildly reduced lung volumes; mildly reduced diffusion at 22.2, which is 60% of predicted, which corrects for volume to 6.18, which is 137% of predicted; abnormal inspiratory limb. PFTs were performed to evaluate for SOB  6 minute walk test reveals a low saturation of 94% with no evidence of dyspnea or fatigue; walked 391.2  meters   FENO was 22; a normal value being less than 25 Fractional exhaled nitric oxide (FENO) is regarded as a simple, noninvasive method for assessing eosinophilic airway inflammation. Produced by a variety of cells within the lung, nitric oxide (NO) concentrations are generally low in healthy individuals. However, high concentrations of NO appear to be involved in nonspecific host defense mechanisms and chronic inflammatory diseases such as asthma. The American Thoracic Society (ATS) therefore has recommended using FENO to aid in the diagnosis and monitoring of eosinophilic airway inflammation and asthma, and for identifying steroid responsive individuals whose chronic respiratory symptoms may be caused by airway inflammation.

## 2024-01-27 LAB
24R-OH-CALCIDIOL SERPL-MCNC: 32.4 PG/ML
25(OH)D3 SERPL-MCNC: 7.9 NG/ML
A1AT SERPL-MCNC: 137 MG/DL
BASOPHILS # BLD AUTO: 0.07 K/UL
BASOPHILS NFR BLD AUTO: 0.9 %
EOSINOPHIL # BLD AUTO: 0.13 K/UL
EOSINOPHIL NFR BLD AUTO: 1.7 %
HCT VFR BLD CALC: 39.5 %
HGB BLD-MCNC: 13.6 G/DL
IMM GRANULOCYTES NFR BLD AUTO: 0.4 %
LYMPHOCYTES # BLD AUTO: 2.49 K/UL
LYMPHOCYTES NFR BLD AUTO: 32.9 %
MAN DIFF?: NORMAL
MCHC RBC-ENTMCNC: 27.6 PG
MCHC RBC-ENTMCNC: 34.4 GM/DL
MCV RBC AUTO: 80.1 FL
MONOCYTES # BLD AUTO: 0.43 K/UL
MONOCYTES NFR BLD AUTO: 5.7 %
NEUTROPHILS # BLD AUTO: 4.42 K/UL
NEUTROPHILS NFR BLD AUTO: 58.4 %
PLATELET # BLD AUTO: 173 K/UL
RBC # BLD: 4.93 M/UL
RBC # FLD: 13.9 %
WBC # FLD AUTO: 7.57 K/UL

## 2024-01-28 LAB
A ALTERNATA IGE QN: <0.1 KUA/L
A ALTERNATA IGE QN: <0.1 KUA/L
A FUMIGATUS IGE QN: 0.1 KUA/L
A FUMIGATUS IGE QN: 0.1 KUA/L
ALMOND IGE QN: <0.1 KUA/L
BERMUDA GRASS IGE QN: <0.1 KUA/L
BOXELDER IGE QN: <0.1 KUA/L
BRAZIL NUT IGE QN: <0.1 KUA/L
C ALBICANS IGE QN: 0.53 KUA/L
C HERBARUM IGE QN: 0.12 KUA/L
C HERBARUM IGE QN: 0.12 KUA/L
CALIF WALNUT IGE QN: <0.1 KUA/L
CASHEW NUT IGE QN: <0.1 KUA/L
CAT DANDER IGE QN: 0.17 KUA/L
CAT DANDER IGE QN: 0.17 KUA/L
CMN PIGWEED IGE QN: <0.1 KUA/L
CODFISH IGE QN: <0.1 KUA/L
COMMON RAGWEED IGE QN: <0.1 KUA/L
COMMON RAGWEED IGE QN: <0.1 KUA/L
COTTONWOOD IGE QN: <0.1 KUA/L
COW MILK IGE QN: 0.29 KUA/L
D FARINAE IGE QN: 1.27 KUA/L
D FARINAE IGE QN: 1.27 KUA/L
D PTERONYSS IGE QN: 0.17 KUA/L
D PTERONYSS IGE QN: 0.17 KUA/L
DEPRECATED A ALTERNATA IGE RAST QL: 0 (ref 0–?)
DEPRECATED A ALTERNATA IGE RAST QL: 0 (ref 0–?)
DEPRECATED A FUMIGATUS IGE RAST QL: NORMAL (ref 0–?)
DEPRECATED A FUMIGATUS IGE RAST QL: NORMAL (ref 0–?)
DEPRECATED ALMOND IGE RAST QL: 0 (ref 0–?)
DEPRECATED BERMUDA GRASS IGE RAST QL: 0 (ref 0–?)
DEPRECATED BOXELDER IGE RAST QL: 0 (ref 0–?)
DEPRECATED BRAZIL NUT IGE RAST QL: 0 (ref 0–?)
DEPRECATED C ALBICANS IGE RAST QL: 1
DEPRECATED C HERBARUM IGE RAST QL: NORMAL (ref 0–?)
DEPRECATED C HERBARUM IGE RAST QL: NORMAL (ref 0–?)
DEPRECATED CASHEW NUT IGE RAST QL: 0 (ref 0–?)
DEPRECATED CAT DANDER IGE RAST QL: NORMAL (ref 0–?)
DEPRECATED CAT DANDER IGE RAST QL: NORMAL (ref 0–?)
DEPRECATED CODFISH IGE RAST QL: 0 (ref 0–?)
DEPRECATED COMMON PIGWEED IGE RAST QL: 0 (ref 0–?)
DEPRECATED COMMON RAGWEED IGE RAST QL: 0 (ref 0–?)
DEPRECATED COMMON RAGWEED IGE RAST QL: 0 (ref 0–?)
DEPRECATED COTTONWOOD IGE RAST QL: 0 (ref 0–?)
DEPRECATED COW MILK IGE RAST QL: NORMAL (ref 0–?)
DEPRECATED D FARINAE IGE RAST QL: 2 (ref 0–?)
DEPRECATED D FARINAE IGE RAST QL: 2 (ref 0–?)
DEPRECATED D PTERONYSS IGE RAST QL: NORMAL (ref 0–?)
DEPRECATED D PTERONYSS IGE RAST QL: NORMAL (ref 0–?)
DEPRECATED DOG DANDER IGE RAST QL: NORMAL (ref 0–?)
DEPRECATED DOG DANDER IGE RAST QL: NORMAL (ref 0–?)
DEPRECATED EGG WHITE IGE RAST QL: 0 (ref 0–?)
DEPRECATED GOOSEFOOT IGE RAST QL: 0 (ref 0–?)
DEPRECATED HAZELNUT IGE RAST QL: 0 (ref 0–?)
DEPRECATED LONDON PLANE IGE RAST QL: 0 (ref 0–?)
DEPRECATED M RACEMOSUS IGE RAST QL: 0
DEPRECATED MOUSE URINE PROT IGE RAST QL: 0 (ref 0–?)
DEPRECATED MUGWORT IGE RAST QL: 0 (ref 0–?)
DEPRECATED P NOTATUM IGE RAST QL: 0 (ref 0–?)
DEPRECATED PEANUT IGE RAST QL: 0 (ref 0–?)
DEPRECATED RED CEDAR IGE RAST QL: 0 (ref 0–?)
DEPRECATED ROACH IGE RAST QL: 0 (ref 0–?)
DEPRECATED ROACH IGE RAST QL: 0 (ref 0–?)
DEPRECATED SALMON IGE RAST QL: 0 (ref 0–?)
DEPRECATED SCALLOP IGE RAST QL: <0.1 KUA/L
DEPRECATED SESAME SEED IGE RAST QL: 0 (ref 0–?)
DEPRECATED SHEEP SORREL IGE RAST QL: 0 (ref 0–?)
DEPRECATED SHRIMP IGE RAST QL: NORMAL (ref 0–?)
DEPRECATED SILVER BIRCH IGE RAST QL: 0 (ref 0–?)
DEPRECATED SOYBEAN IGE RAST QL: 0 (ref 0–?)
DEPRECATED TIMOTHY IGE RAST QL: 0 (ref 0–?)
DEPRECATED TIMOTHY IGE RAST QL: 0 (ref 0–?)
DEPRECATED TUNA IGE RAST QL: 0 (ref 0–?)
DEPRECATED WALNUT IGE RAST QL: 0 (ref 0–?)
DEPRECATED WHEAT IGE RAST QL: 0 (ref 0–?)
DEPRECATED WHITE ASH IGE RAST QL: 0 (ref 0–?)
DEPRECATED WHITE OAK IGE RAST QL: 0 (ref 0–?)
DEPRECATED WHITE OAK IGE RAST QL: 0 (ref 0–?)
DOG DANDER IGE QN: 0.23 KUA/L
DOG DANDER IGE QN: 0.23 KUA/L
EGG WHITE IGE QN: <0.1 KUA/L
GOOSEFOOT IGE QN: <0.1 KUA/L
HAZELNUT IGE QN: <0.1 KUA/L
LONDON PLANE IGE QN: <0.1 KUA/L
M RACEMOSUS IGE QN: <0.1 KUA/L
MOUSE URINE PROT IGE QN: <0.1 KUA/L
MUGWORT IGE QN: <0.1 KUA/L
MULBERRY (T70) CLASS: 0 (ref 0–?)
MULBERRY (T70) CONC: <0.1 KUA/L
P NOTATUM IGE QN: <0.1 KUA/L
PEANUT IGE QN: <0.1 KUA/L
RED CEDAR IGE QN: <0.1 KUA/L
ROACH IGE QN: <0.1 KUA/L
ROACH IGE QN: <0.1 KUA/L
SALMON IGE QN: <0.1 KUA/L
SCALLOP IGE QN: 0 (ref 0–?)
SCALLOP IGE QN: 0.14 KUA/L
SESAME SEED IGE QN: <0.1 KUA/L
SHEEP SORREL IGE QN: <0.1 KUA/L
SILVER BIRCH IGE QN: <0.1 KUA/L
SOYBEAN IGE QN: <0.1 KUA/L
TIMOTHY IGE QN: <0.1 KUA/L
TIMOTHY IGE QN: <0.1 KUA/L
TOTAL IGE SMQN RAST: 50 KU/L
TOTAL IGE SMQN RAST: 50 KU/L
TREE ALLERG MIX1 IGE QL: 0 (ref 0–?)
TUNA IGE QN: <0.1 KUA/L
WALNUT IGE QN: <0.1 KUA/L
WHEAT IGE QN: <0.1 KUA/L
WHITE ASH IGE QN: <0.1 KUA/L
WHITE ELM IGE QN: 0 (ref 0–?)
WHITE ELM IGE QN: <0.1 KUA/L
WHITE OAK IGE QN: <0.1 KUA/L
WHITE OAK IGE QN: <0.1 KUA/L

## 2024-01-29 NOTE — H&P PST ADULT - BP NONINVASIVE SYSTOLIC (MM HG)
Returned call to patient and conveyed MRI results per Dr Morrissey's message below. Patient verbalized understanding and agreeable to see Dr Pop to discuss ongoing symptoms. Informed her that his office will call her to scheduled. She thanked me for the call.    122

## 2024-01-30 LAB
TESTOST FREE SERPL-MCNC: 5.1 PG/ML
TESTOST SERPL-MCNC: 91.8 NG/DL

## 2024-02-01 LAB
A1AT PHENOTYP SERPL-IMP: NORMAL
A1AT SERPL-MCNC: 134 MG/DL

## 2024-02-02 ENCOUNTER — OUTPATIENT (OUTPATIENT)
Dept: OUTPATIENT SERVICES | Facility: HOSPITAL | Age: 34
LOS: 1 days | End: 2024-02-02

## 2024-02-02 ENCOUNTER — APPOINTMENT (OUTPATIENT)
Dept: ULTRASOUND IMAGING | Facility: CLINIC | Age: 34
End: 2024-02-02
Payer: COMMERCIAL

## 2024-02-02 DIAGNOSIS — Z90.49 ACQUIRED ABSENCE OF OTHER SPECIFIED PARTS OF DIGESTIVE TRACT: Chronic | ICD-10-CM

## 2024-02-02 DIAGNOSIS — Z96.0 PRESENCE OF UROGENITAL IMPLANTS: Chronic | ICD-10-CM

## 2024-02-02 DIAGNOSIS — Z98.890 OTHER SPECIFIED POSTPROCEDURAL STATES: Chronic | ICD-10-CM

## 2024-02-02 DIAGNOSIS — K30 FUNCTIONAL DYSPEPSIA: ICD-10-CM

## 2024-02-02 DIAGNOSIS — Z87.81 PERSONAL HISTORY OF (HEALED) TRAUMATIC FRACTURE: Chronic | ICD-10-CM

## 2024-02-02 PROCEDURE — 93975 VASCULAR STUDY: CPT | Mod: 26

## 2024-02-02 PROCEDURE — 76700 US EXAM ABDOM COMPLETE: CPT | Mod: 26

## 2024-02-06 ENCOUNTER — APPOINTMENT (OUTPATIENT)
Dept: CARDIOLOGY | Facility: CLINIC | Age: 34
End: 2024-02-06

## 2024-02-27 NOTE — ED ADULT TRIAGE NOTE - NS ED NURSE DIRECT TO ROOM YN
Yes
FAMILY HISTORY:  Mother  Still living? Unknown  FH: diabetes mellitus, Age at diagnosis: Age Unknown

## 2024-03-07 ENCOUNTER — RX RENEWAL (OUTPATIENT)
Age: 34
End: 2024-03-07

## 2024-03-26 ENCOUNTER — APPOINTMENT (OUTPATIENT)
Dept: PULMONOLOGY | Facility: CLINIC | Age: 34
End: 2024-03-26
Payer: COMMERCIAL

## 2024-03-26 VITALS
BODY MASS INDEX: 47.56 KG/M2 | SYSTOLIC BLOOD PRESSURE: 140 MMHG | TEMPERATURE: 97.6 F | OXYGEN SATURATION: 98 % | WEIGHT: 303 LBS | RESPIRATION RATE: 18 BRPM | HEIGHT: 67 IN | DIASTOLIC BLOOD PRESSURE: 90 MMHG | HEART RATE: 77 BPM

## 2024-03-26 DIAGNOSIS — J45.909 UNSPECIFIED ASTHMA, UNCOMPLICATED: ICD-10-CM

## 2024-03-26 DIAGNOSIS — K21.9 GASTRO-ESOPHAGEAL REFLUX DISEASE W/OUT ESOPHAGITIS: ICD-10-CM

## 2024-03-26 DIAGNOSIS — J30.89 OTHER ALLERGIC RHINITIS: ICD-10-CM

## 2024-03-26 DIAGNOSIS — G47.33 OBSTRUCTIVE SLEEP APNEA (ADULT) (PEDIATRIC): ICD-10-CM

## 2024-03-26 PROCEDURE — 94727 GAS DIL/WSHOT DETER LNG VOL: CPT

## 2024-03-26 PROCEDURE — 95012 NITRIC OXIDE EXP GAS DETER: CPT

## 2024-03-26 PROCEDURE — 99215 OFFICE O/P EST HI 40 MIN: CPT | Mod: 25

## 2024-03-26 PROCEDURE — 94729 DIFFUSING CAPACITY: CPT

## 2024-03-26 PROCEDURE — 94010 BREATHING CAPACITY TEST: CPT

## 2024-03-26 RX ORDER — FLUTICASONE PROPIONATE AND SALMETEROL 250; 50 UG/1; UG/1
250-50 POWDER RESPIRATORY (INHALATION)
Qty: 1 | Refills: 3 | Status: ACTIVE | COMMUNITY

## 2024-03-26 RX ORDER — AZELASTINE HYDROCHLORIDE 137 UG/1
0.1 SPRAY, METERED NASAL TWICE DAILY
Qty: 1 | Refills: 2 | Status: ACTIVE | COMMUNITY
Start: 2024-03-26 | End: 1900-01-01

## 2024-03-26 NOTE — HISTORY OF PRESENT ILLNESS
[TextBox_4] : INITIAL VISIT 1/26/2024 Mr. MCCLELLAN is a 33 year old male with a history of kidney (uric acid) stones, obesity/OW, DM, ARACELIS (2019), PNA 2019 complicated by asthmatic Sx, prior allergies, GERD, ARACELIS, COVID-19 (12/2022) presenting to the office today for an initial pulmonary evaluation. His chief complaint is  -he denies SOB with stairs or inclines -he notes having severe back pain with COVID-19. His chest CT showed "white opacities" on his lungs, which was thought to be COVID-PNA. After COVID, he had seasonal asthma, and he was put on inhalers (Advair, albuterol) by Dr. Abdulkadir Shepard. He hasn't used Advair in 1 year, and he still uses albuterol -he notes allergy Sx: itchy eyes, mild PND. He takes Xyzal -he notes he's had severe heartburn and reflux over the past 1-2 months. He's on Omeprazole 40 mg QAM, with breakthrough every 3-4 days. He believes his Sx began with an increase in the dosage of potassium citrate. He has heartburn-related chest pain that radiates to his armpit. He has a pending ECHO 2/2024 to r/o cardiac symptomatology (Mj Moya) -he notes having mild leg swelling -he notes he's lost over 100 lbs since 2019 -he notes he uses his CPAP every night with the F30i FFM, [id number] machine by [employer]. He's had this machine since 2022.  He was Dx with ARACELIS 2019, and he was originally on a BiPAP. He still has the BiPAP at home. -he notes his libido is good -he denies needing to take daytime naps while using his CPAP -he notes bowels are regular  -he notes his senses of smell and taste are stable  -he notes having joint aches and pains due to standing on his feet ~16 hours per day -he notes having a history of issues with uric acid. He's had 12 kidney procedures due to his kidney stones. He's on Allopurinol. -he notes he's considering going on Mounjaro  -he denies any headaches, nausea, emesis, fever, chills, sweats, chest pain, chest pressure, coughing, wheezing, palpitations, constipation, diarrhea, vertigo, dysphagia, itchy ears, leg pain, or sour taste in the mouth.   TODAY'S VISIT 3/26/2024 Mr. MCCLELLAN is a 33 year old male with a history of kidney (uric acid) stones, obesity/OW, DM, ARACELIS (2019), PNA 2019 complicated by asthmatic Sx, prior allergies, GERD, ARACELIS, COVID-19 (12/2022) presenting to the office today for a follow up pulmonary evaluation.   -reports his breathing has improved since using Advair inhaler; uses it daily -reports he has seasonal allergies; complains of sinus congestion and itchy eyes -reports he started mounjaro and lost 37 lbs over past 7 weeks -reports he has been walking regularly and reduced his caloric intake to 1200 daisy/day -reports he takes omeprazole now ordered by his GI -reports he takes Berberine and boron -reports he was seen by a nephrologist and had renal ultrasound done that returned normal and was told no further follow up needed  denies any headaches, nausea, vomiting, fever, chills, sweats, chest pain, chest pressure, palpitations, coughing, wheezing, fatigue, constipation, dysphagia, dizziness, leg swelling, leg pain, itchy eyes, itchy ears, heartburn, reflux or sour taste in the mouth.

## 2024-03-26 NOTE — PHYSICAL EXAM
[No Acute Distress] : no acute distress [Normal Oropharynx] : normal oropharynx [Normal Appearance] : normal appearance [No Neck Mass] : no neck mass [Normal Rate/Rhythm] : normal rate/rhythm [Normal S1, S2] : normal s1, s2 [No Resp Distress] : no resp distress [Clear to Auscultation Bilaterally] : clear to auscultation bilaterally [No Abnormalities] : no abnormalities [Benign] : benign [Normal Gait] : normal gait [No Clubbing] : no clubbing [No Cyanosis] : no cyanosis [No Edema] : no edema [Normal Color/ Pigmentation] : normal color/ pigmentation [FROM] : FROM [No Focal Deficits] : no focal deficits [Oriented x3] : oriented x3 [TextBox_2] : OW [Normal Affect] : normal affect

## 2024-03-26 NOTE — REVIEW OF SYSTEMS
[Postnasal Drip] : postnasal drip [Seasonal Allergies] : seasonal allergies [GERD] : gerd [Negative] : Psychiatric

## 2024-03-26 NOTE — PROCEDURE
[FreeTextEntry1] : 1/26/2024 Full PFT reveals mild-moderate restrictive dysfunction; FEV1 was 2.15L which is 57% of predicted, with a 4% change on BD; mildly reduced lung volumes; mildly reduced diffusion at 22.2, which is 60% of predicted, which corrects for volume to 6.18, which is 137% of predicted; abnormal inspiratory limb. PFTs were performed to evaluate for SOB  FENO was 22; a normal value being less than 25  3/26/2024 (no improvement from previous PFT due to patient not taking inhaler BID; plan to repeat PFT at next visit) Full PFT reveals mild-moderate restrictive dysfunction; FEV1 was 2.16L which is 57% of predicted, PFTs were performed to evaluate for SOB  FENO was 19; a normal value being less than 25 Fractional exhaled nitric oxide (FENO) is regarded as a simple, noninvasive method for assessing eosinophilic airway inflammation. Produced by a variety of cells within the lung, nitric oxide (NO) concentrations are generally low in healthy individuals. However, high concentrations of NO appear to be involved in nonspecific host defense mechanisms and chronic inflammatory diseases such as asthma. The American Thoracic Society (ATS) therefore has recommended using FENO to aid in the diagnosis and monitoring of eosinophilic airway inflammation and asthma, and for identifying steroid responsive individuals whose chronic respiratory symptoms may be caused by airway inflammation.

## 2024-03-26 NOTE — ASSESSMENT
[FreeTextEntry1] : Mr. MCCLELLAN is a 33 year old male with a history of kidney (uric acid) stones, obesity/OW, DM, ARACELIS (2019), PNA 2019 complicated by asthmatic Sx, prior allergies, GERD, ARACELIS, COVID-19 (12/2022) who now comes to the office for a follow up pulmonary evaluation for SOB, asthma, allergies, GERD, ARACELIS.   His shortness of breath is multifactorial due to: -poor mechanics of breathing -out of shape -overweight -pulmonary disease   -asthma -cardiac disease   Problem 1: Asthma -Reeducated patient to use Advair 250 1 inhalation BID instead of daily (rinse and gargle) -Continue Albuterol via inhaler 2 puffs up to Q6H, pre-exercise PRN  -Asthma is believed to be caused by inherited (genetic) and environmental factor, but its exact cause is unknown. Asthma may be triggered by allergens, lung infections, or irritants in the air. Asthma triggers are different for each person.  -Inhaler technique reviewed as well as oral hygiene techniques reviewed with patient. Avoidance of cold air, extremes of temperature, rescue inhaler should be used before exercise. Order of medication reviewed with patient. Recommended adequate hydration and use of a cool mist humidifier in the bedroom    Problem 1a: Vit D deficiency -Continue Vit D 10,000 u daily  Problem 2: allergies/sinus -Add Azelastine 0.1% nasal spray 1 spray BID -Add fluticasone 50 mcg nasal spray 1 spray BID  -Continue Xyzal 5 mg QHS  -s/p blood work to include: asthma panel, food IgE panel, IgE level, eosinophil level, vitamin D level (results reviewed) -dust mite, mold allergy -Environmental measures for allergies were encouraged including mattress and pillow covers, air purifier, and environmental controls.    Problem 3: GERD (potassium-citrate related) -Continue Omeprazole daily as ordered by GI -D/C Pepcid 40 mg QHS  -Rule of 2s: avoid eating too much, eating too late, eating too spicy, eating two hours before bed. -Things to avoid including overeating, spicy foods, tight clothing, eating within three hours of bed, this list is not all inclusive. -For treatment of reflux, possible options discussed including diet control, H2 blockers, PPIs, as well as coating motility agents discussed as treatment options. Timing of meals and proximity of last meal to sleep were discussed. If symptoms persist, a formal gastrointestinal evaluation is needed.    Problem 4: ARACELIS (Dx 2019) -CPAP in place F30i FFM small, [id number] machine, 12 cm H2O by [facility]- tolerating well -prior BiPAP -Communication sent to [facility] [employer] requesting connecting CPAP to office to obtain CPAP data -s/p blood work to include: free and total testosterone  -Sleep apnea is associated with adverse clinical consequences which can affect most organ systems. Cardiovascular disease risk includes arrhythmias, atrial fibrillation, hypertension, coronary artery disease, and stroke. Metabolic disorders include diabetes type 2, non-alcoholic fatty liver disease. Mood disorder especially depression; and cognitive decline especially in the elderly. Associations with chronic reflux/Barretts esophagus some but not all inclusive. -Reasons include arousal consistent with hypopnea; respiratory events most prominent in REM sleep or supine position; therefore sleep staging and body position are important for accurate diagnosis and estimation of AHI.   Problem 4a: Low testosterone levels -Recommended Boron supplement  Problem 5: cardiac disease -recommended to continue to follow up with Cardiologist (Mj Moya)   Problem 6: overweight/ out of shape -Continue Berberine OTC supplement for visceral fat loss  -Weight loss, exercise, and diet control were discussed and are highly encouraged. Treatment options are given such as aqua therapy, and contacting a nutritionist. Recommended to use the elliptical, stationary bike, less use of the treadmill.  Problem 7: health maintenance - followed-up with Dr. Chirinos (nephrology)- renal ultrasound normal (as per pt); no further follow up needed -recommended yearly flu shot after October 15, 2023 -recommended strep pneumonia vaccines: Prevnar-20 vaccine, followed by Pneumo vaccine 23 one year following after 65 years old. -recommended early intervention for Upper Respiratory Infections (URIs) -recommended regular osteoporosis evaluations -recommended early dermatological evaluations -recommended after the age of 50 to the age of 70, colonoscopy every 5 years   F/P in 1 month He is encouraged to call with any changes, concerns, or questions

## 2024-04-02 NOTE — ED STATDOCS - NS ED MD DISPO DISCHARGE
[FreeTextEntry1] : Papito is a 4yo boy with ASD and ADHD here for follow up sleep consultation of insomnia and behavioral concerns during the day. Papito has difficulty with sleep initiation and sleep maintenance. Doxepin and melatonin never started after last visit. Plan to restart Doxepin QHS for sleep.
Home

## 2024-04-04 NOTE — ED PROVIDER NOTE - CCCP TRG CHIEF CMPLNT
We will schedule a repeat EGD and Colonoscopy- we will call you to schedule   Continue pantoprazole daily   Continue dicyclomine as needed for abdominal cramping and loose stool     
flank pain

## 2024-04-07 ENCOUNTER — RX RENEWAL (OUTPATIENT)
Age: 34
End: 2024-04-07

## 2024-04-08 ENCOUNTER — RX RENEWAL (OUTPATIENT)
Age: 34
End: 2024-04-08

## 2024-04-17 ENCOUNTER — APPOINTMENT (OUTPATIENT)
Dept: UROLOGY | Facility: CLINIC | Age: 34
End: 2024-04-17
Payer: COMMERCIAL

## 2024-04-17 PROCEDURE — 99442: CPT

## 2024-04-17 RX ORDER — SODIUM BICARBONATE 650 MG/1
650 TABLET ORAL
Qty: 30 | Refills: 0 | Status: DISCONTINUED | COMMUNITY
Start: 2022-08-06 | End: 2024-04-17

## 2024-04-17 NOTE — ED ADULT NURSE NOTE - WILL THE PATIENT ACCEPT THE PFIZER COVID-19 VACCINE IF ELIGIBLE AND IT IS AVAILABLE?
Are there any outstanding tasks in patient's chart?    N  2. Do we have outstanding/pending referrals?    N  3. Has the patient been seen in an ER, Urgent Care, or admitted since last visit?  N    4. Has patient seen any other health care providers since last visit?  RENATA JARRETT, NP- [facility]    5.  Has patient had any blood work or x-rays done since last visit?   N  
No

## 2024-04-17 NOTE — HISTORY OF PRESENT ILLNESS
[Home] : at home, [unfilled] , at the time of the visit. [Medical Office: ([facility])___] : at the medical office located in  [Verbal consent obtained from patient] : the patient, [unfilled] [FreeTextEntry1] : 31 yo M see previous notes last seen 07/2023 doing well and lost weight still on K-citrate and allopurinol not on any Na-bicarbonate no new 24 hr   reviewed recent ultrasound 02/2024: no stones  plan: - continue with medical treatment - follow up in 2 months with new 24 hr

## 2024-04-19 ENCOUNTER — APPOINTMENT (OUTPATIENT)
Dept: CARDIOLOGY | Facility: CLINIC | Age: 34
End: 2024-04-19
Payer: COMMERCIAL

## 2024-04-19 PROCEDURE — G2211 COMPLEX E/M VISIT ADD ON: CPT

## 2024-04-19 PROCEDURE — 99213 OFFICE O/P EST LOW 20 MIN: CPT

## 2024-04-19 RX ORDER — TIRZEPATIDE 5 MG/.5ML
5 INJECTION, SOLUTION SUBCUTANEOUS
Qty: 2 | Refills: 3 | Status: ACTIVE | COMMUNITY
Start: 2024-01-24 | End: 1900-01-01

## 2024-04-24 ENCOUNTER — APPOINTMENT (OUTPATIENT)
Dept: PULMONOLOGY | Facility: CLINIC | Age: 34
End: 2024-04-24

## 2024-04-26 RX ORDER — TIRZEPATIDE 2.5 MG/.5ML
2.5 INJECTION, SOLUTION SUBCUTANEOUS
Qty: 4 | Refills: 0 | Status: ACTIVE | COMMUNITY
Start: 2024-04-26 | End: 1900-01-01

## 2024-05-01 RX ORDER — FLUTICASONE PROPIONATE 50 UG/1
50 SPRAY, METERED NASAL TWICE DAILY
Qty: 3 | Refills: 1 | Status: ACTIVE | COMMUNITY
Start: 2024-03-26 | End: 1900-01-01

## 2024-05-26 ENCOUNTER — NON-APPOINTMENT (OUTPATIENT)
Age: 34
End: 2024-05-26

## 2024-05-27 ENCOUNTER — EMERGENCY (EMERGENCY)
Facility: HOSPITAL | Age: 34
LOS: 1 days | Discharge: HOME | End: 2024-05-27
Attending: STUDENT IN AN ORGANIZED HEALTH CARE EDUCATION/TRAINING PROGRAM
Payer: COMMERCIAL

## 2024-05-27 VITALS
TEMPERATURE: 98 F | RESPIRATION RATE: 18 BRPM | DIASTOLIC BLOOD PRESSURE: 90 MMHG | OXYGEN SATURATION: 95 % | HEART RATE: 86 BPM | HEIGHT: 67 IN | SYSTOLIC BLOOD PRESSURE: 144 MMHG | WEIGHT: 300.71 LBS

## 2024-05-27 DIAGNOSIS — Z96.0 PRESENCE OF UROGENITAL IMPLANTS: Chronic | ICD-10-CM

## 2024-05-27 DIAGNOSIS — Z87.81 PERSONAL HISTORY OF (HEALED) TRAUMATIC FRACTURE: Chronic | ICD-10-CM

## 2024-05-27 DIAGNOSIS — Z90.49 ACQUIRED ABSENCE OF OTHER SPECIFIED PARTS OF DIGESTIVE TRACT: Chronic | ICD-10-CM

## 2024-05-27 DIAGNOSIS — Z98.890 OTHER SPECIFIED POSTPROCEDURAL STATES: Chronic | ICD-10-CM

## 2024-05-27 LAB
ALBUMIN SERPL ELPH-MCNC: 4 G/DL — SIGNIFICANT CHANGE UP (ref 3.3–5.2)
ALP SERPL-CCNC: 117 U/L — SIGNIFICANT CHANGE UP (ref 40–120)
ALT FLD-CCNC: 40 U/L — SIGNIFICANT CHANGE UP
ANION GAP SERPL CALC-SCNC: 13 MMOL/L — SIGNIFICANT CHANGE UP (ref 5–17)
APPEARANCE UR: CLEAR — SIGNIFICANT CHANGE UP
AST SERPL-CCNC: 32 U/L — SIGNIFICANT CHANGE UP
BASOPHILS # BLD AUTO: 0.05 K/UL — SIGNIFICANT CHANGE UP (ref 0–0.2)
BASOPHILS NFR BLD AUTO: 0.6 % — SIGNIFICANT CHANGE UP (ref 0–2)
BILIRUB SERPL-MCNC: 0.4 MG/DL — SIGNIFICANT CHANGE UP (ref 0.4–2)
BILIRUB UR-MCNC: NEGATIVE — SIGNIFICANT CHANGE UP
BUN SERPL-MCNC: 20.7 MG/DL — HIGH (ref 8–20)
CALCIUM SERPL-MCNC: 9.5 MG/DL — SIGNIFICANT CHANGE UP (ref 8.4–10.5)
CHLORIDE SERPL-SCNC: 99 MMOL/L — SIGNIFICANT CHANGE UP (ref 96–108)
CO2 SERPL-SCNC: 26 MMOL/L — SIGNIFICANT CHANGE UP (ref 22–29)
COLOR SPEC: YELLOW — SIGNIFICANT CHANGE UP
CREAT SERPL-MCNC: 0.58 MG/DL — SIGNIFICANT CHANGE UP (ref 0.5–1.3)
DIFF PNL FLD: NEGATIVE — SIGNIFICANT CHANGE UP
EGFR: 131 ML/MIN/1.73M2 — SIGNIFICANT CHANGE UP
EOSINOPHIL # BLD AUTO: 0.19 K/UL — SIGNIFICANT CHANGE UP (ref 0–0.5)
EOSINOPHIL NFR BLD AUTO: 2.3 % — SIGNIFICANT CHANGE UP (ref 0–6)
GLUCOSE SERPL-MCNC: 140 MG/DL — HIGH (ref 70–99)
GLUCOSE UR QL: NEGATIVE MG/DL — SIGNIFICANT CHANGE UP
HCT VFR BLD CALC: 44.5 % — SIGNIFICANT CHANGE UP (ref 39–50)
HCV AB S/CO SERPL IA: 0.08 S/CO — SIGNIFICANT CHANGE UP (ref 0–0.99)
HCV AB SERPL-IMP: SIGNIFICANT CHANGE UP
HGB BLD-MCNC: 14.4 G/DL — SIGNIFICANT CHANGE UP (ref 13–17)
HIV 1 & 2 AB SERPL IA.RAPID: SIGNIFICANT CHANGE UP
IMM GRANULOCYTES NFR BLD AUTO: 0.4 % — SIGNIFICANT CHANGE UP (ref 0–0.9)
KETONES UR-MCNC: NEGATIVE MG/DL — SIGNIFICANT CHANGE UP
LEUKOCYTE ESTERASE UR-ACNC: NEGATIVE — SIGNIFICANT CHANGE UP
LIDOCAIN IGE QN: 50 U/L — SIGNIFICANT CHANGE UP (ref 22–51)
LYMPHOCYTES # BLD AUTO: 2.04 K/UL — SIGNIFICANT CHANGE UP (ref 1–3.3)
LYMPHOCYTES # BLD AUTO: 24.4 % — SIGNIFICANT CHANGE UP (ref 13–44)
MCHC RBC-ENTMCNC: 26.5 PG — LOW (ref 27–34)
MCHC RBC-ENTMCNC: 32.4 GM/DL — SIGNIFICANT CHANGE UP (ref 32–36)
MCV RBC AUTO: 81.8 FL — SIGNIFICANT CHANGE UP (ref 80–100)
MONOCYTES # BLD AUTO: 0.54 K/UL — SIGNIFICANT CHANGE UP (ref 0–0.9)
MONOCYTES NFR BLD AUTO: 6.5 % — SIGNIFICANT CHANGE UP (ref 2–14)
NEUTROPHILS # BLD AUTO: 5.52 K/UL — SIGNIFICANT CHANGE UP (ref 1.8–7.4)
NEUTROPHILS NFR BLD AUTO: 65.8 % — SIGNIFICANT CHANGE UP (ref 43–77)
NITRITE UR-MCNC: NEGATIVE — SIGNIFICANT CHANGE UP
PH UR: 6 — SIGNIFICANT CHANGE UP (ref 5–8)
PLATELET # BLD AUTO: 219 K/UL — SIGNIFICANT CHANGE UP (ref 150–400)
POTASSIUM SERPL-MCNC: 4.5 MMOL/L — SIGNIFICANT CHANGE UP (ref 3.5–5.3)
POTASSIUM SERPL-SCNC: 4.5 MMOL/L — SIGNIFICANT CHANGE UP (ref 3.5–5.3)
PROT SERPL-MCNC: 7.1 G/DL — SIGNIFICANT CHANGE UP (ref 6.6–8.7)
PROT UR-MCNC: NEGATIVE MG/DL — SIGNIFICANT CHANGE UP
RBC # BLD: 5.44 M/UL — SIGNIFICANT CHANGE UP (ref 4.2–5.8)
RBC # FLD: 14.2 % — SIGNIFICANT CHANGE UP (ref 10.3–14.5)
SODIUM SERPL-SCNC: 138 MMOL/L — SIGNIFICANT CHANGE UP (ref 135–145)
SP GR SPEC: 1.02 — SIGNIFICANT CHANGE UP (ref 1–1.03)
UROBILINOGEN FLD QL: 0.2 MG/DL — SIGNIFICANT CHANGE UP (ref 0.2–1)
WBC # BLD: 8.37 K/UL — SIGNIFICANT CHANGE UP (ref 3.8–10.5)
WBC # FLD AUTO: 8.37 K/UL — SIGNIFICANT CHANGE UP (ref 3.8–10.5)

## 2024-05-27 PROCEDURE — 86703 HIV-1/HIV-2 1 RESULT ANTBDY: CPT

## 2024-05-27 PROCEDURE — 76870 US EXAM SCROTUM: CPT

## 2024-05-27 PROCEDURE — 99285 EMERGENCY DEPT VISIT HI MDM: CPT

## 2024-05-27 PROCEDURE — 87086 URINE CULTURE/COLONY COUNT: CPT

## 2024-05-27 PROCEDURE — 74177 CT ABD & PELVIS W/CONTRAST: CPT | Mod: 26,MC

## 2024-05-27 PROCEDURE — 99284 EMERGENCY DEPT VISIT MOD MDM: CPT | Mod: 25

## 2024-05-27 PROCEDURE — 81003 URINALYSIS AUTO W/O SCOPE: CPT

## 2024-05-27 PROCEDURE — 86803 HEPATITIS C AB TEST: CPT

## 2024-05-27 PROCEDURE — 85025 COMPLETE CBC W/AUTO DIFF WBC: CPT

## 2024-05-27 PROCEDURE — 83690 ASSAY OF LIPASE: CPT

## 2024-05-27 PROCEDURE — 76870 US EXAM SCROTUM: CPT | Mod: 26

## 2024-05-27 PROCEDURE — 80053 COMPREHEN METABOLIC PANEL: CPT

## 2024-05-27 PROCEDURE — 74177 CT ABD & PELVIS W/CONTRAST: CPT | Mod: MC

## 2024-05-27 PROCEDURE — 36415 COLL VENOUS BLD VENIPUNCTURE: CPT

## 2024-05-27 RX ORDER — IBUPROFEN 200 MG
1 TABLET ORAL
Qty: 18 | Refills: 0
Start: 2024-05-27 | End: 2024-06-01

## 2024-05-27 NOTE — ED PROVIDER NOTE - PROGRESS NOTE DETAILS
Labs reviewed and grossly unremarkable. Mildly elevated  glucose 140.   UA  unremarkable.   US testicles: No evidence of testicular torsion or orchitis.  CT abdomen: Nonobstructing right renal stones. 1 mm nonobstructing stone in the midpole of the right kidney on image 77 and 3 mm stone in the lower pole on image 83. Trace bilateral perinephric stranding. Trace bilateral renal cortical irregularity, compatible with scarring. 8 mm cyst in the left kidney. Otherwise, the kidneys are symmetric in appearance and enhancement with no evidence of hydronephrosis bilaterally.    Pt reports improvement of symptoms in the ED.   All pertinent results explained to patient and instructed to f/u with his urologist/PCP within 2-3 days.   Strict ED return precautions given if any new or worsening symptoms.

## 2024-05-27 NOTE — ED ADULT TRIAGE NOTE - CHIEF COMPLAINT QUOTE
pt states he has right side scrotal pain, sent from urgent care, originally happened 1 month ago- saw Urologist went away & back again  A&Ox3, resp wnl

## 2024-05-27 NOTE — ED PROVIDER NOTE - CARE PROVIDER_API CALL
Cristi Siddiqui Calvin  Urology  [address] [place] [id number]  Phone: [phone]  Fax: [phone]  Follow Up Time: 7-10 Days

## 2024-05-27 NOTE — ED PROVIDER NOTE - PHYSICAL EXAMINATION
Const: Awake, alert and oriented. In no acute distress. Well appearing.  HEENT: NC/AT. Moist mucous membranes.  Eyes: No scleral icterus. EOMI.  Neck:. Soft and supple. Full ROM without pain.  Cardiac: Regular rate and regular rhythm. +S1/S2. Peripheral pulses 2+ and symmetric. No LE edema.  Resp: Speaking in full sentences. No evidence of respiratory distress. No wheezes, rales or rhonchi.  Abd: Soft, non-tender, non-distended. Normal bowel sounds in all 4 quadrants. No guarding or rebound.  : non tender scrotum, right testicle higher lie than left, +cremasteric reflex, no masses or hernias palpated   Back: Spine midline and non-tender. No CVAT.  Skin: No rashes, abrasions or lacerations.  Lymph: No cervical lymphadenopathy.  Neuro: Awake, alert & oriented x 3. Moves all extremities symmetrically. Const: Awake, alert and oriented. In no acute distress. Well appearing.  HEENT: NC/AT. Moist mucous membranes.  Eyes: No scleral icterus. EOMI.  Neck:. Soft and supple. Full ROM without pain.  Cardiac: Regular rate and regular rhythm. +S1/S2. Peripheral pulses 2+ and symmetric. No LE edema.  Resp: Speaking in full sentences. No evidence of respiratory distress. No wheezes, rales or rhonchi.  Abd: Soft, non-tender, non-distended. Normal bowel sounds in all 4 quadrants. No guarding or rebound.  : non tender scrotum, right testicle higher lie than left, +cremasteric reflex, no masses or hernias palpated Chaperone: [place]o PA  Back: Spine midline and non-tender. No CVAT.  Skin: No rashes, abrasions or lacerations.  Lymph: No cervical lymphadenopathy.  Neuro: Awake, alert & oriented x 3. Moves all extremities symmetrically.

## 2024-05-27 NOTE — ED ADULT NURSE NOTE - OBJECTIVE STATEMENT
Assumed care of patient in ED alert and oriented x4, resting comfortably in bed stretcher, in no apparent distress. C/o right scrotal pain. Sono done, IV placed and labs sent prior to RN receiving care. DEnies any swelling or redness to site. Awaiting results and CT scan.

## 2024-05-27 NOTE — ED PROVIDER NOTE - NSFOLLOWUPINSTRUCTIONS_ED_ALL_ED_FT
Please take motrin 600mg every 6 hours with food as needed for pain.  1)Follow up with your PCP in 1 week  2)Follow up with urologist within 1 week  Return to the emergency room if you are experiencing any new or worsening symptoms    Abdominal Pain    Many things can cause abdominal pain. Many times, abdominal pain is not caused by a disease and will improve without treatment. Your health care provider will do a physical exam to determine if there is a dangerous cause of your pain; blood tests and imaging may help determine the cause of your pain. However, in many cases, no cause may be found and you may need further testing as an outpatient. Monitor your abdominal pain for any changes.     SEEK IMMEDIATE MEDICAL CARE IF YOU HAVE ANY OF THE FOLLOWING SYMPTOMS: worsening abdominal pain, uncontrollable vomiting, profuse diarrhea, inability to have bowel movements or pass gas, black or bloody stools, fever accompanying chest pain or back pain, or fainting. These symptoms may represent a serious problem that is an emergency. Do not wait to see if the symptoms will go away. Get medical help right away. Call 911 and do not drive yourself to the hospital.      Kidney Stones    Kidney stones (urolithiasis) are crystal deposits that form inside your kidneys. Pain is caused by the stone moving through the urinary tract, causing spasms of the ureter. Drink enough water and fluids to keep your urine clear or pale yellow. This will help you to pass the stone or stone fragments. If provided a strainer, strain all urine and keep all particulate matter and stones for a follow up appointment with a urologist.    SEEK IMMEDIATE MEDICAL CARE IF YOU HAVE ANY OF THE FOLLOWING SYMPTOMS: pain not controlled with medication, fever/chills, worsening vomiting, inability to urinate, or dizziness/lightheadedness.

## 2024-05-27 NOTE — ED ADULT NURSE REASSESSMENT NOTE - NS ED NURSE REASSESS COMMENT FT1
assumed care of patient at 1050 , patient sent to [facility] upon receiving care. Will assess when patient returns. IV placed labs sent prior to receiving care.

## 2024-05-27 NOTE — ED PROVIDER NOTE - CARE PLAN
Principal Discharge DX:	Scrotal pain  Secondary Diagnosis:	Right kidney stone   1 Principal Discharge DX:	Scrotal pain  Secondary Diagnosis:	Right kidney stone  Secondary Diagnosis:	Cyst of left kidney

## 2024-05-27 NOTE — ED PROVIDER NOTE - CLINICAL SUMMARY MEDICAL DECISION MAKING FREE TEXT BOX
34yo male with pmh of DM and nephrolithiasis presents with pelvic and scrotal pain = eval for testicular torsion vs hernia vs uti vs renal colic - labs, ct, US, pt states his pain has resolved at this time deferring pain medications

## 2024-05-27 NOTE — ED PROVIDER NOTE - OBJECTIVE STATEMENT
32yo male with pmh of DM and nephrolithiasis presents with pelvic and scrotal pain. Pt states a month ago while at work lifted something and felt pain in his scrotum went away after an hour. Pt states yesterday similar happened and today while walking felt pain in his pelvic region. Pt denies fevers/chills, ha, loc, focal neuro deficits, cp/sob/palp, cough, abd pain/n/v/d, urinary symptoms, recent travel

## 2024-05-28 LAB
CULTURE RESULTS: SIGNIFICANT CHANGE UP
SPECIMEN SOURCE: SIGNIFICANT CHANGE UP

## 2024-05-29 ENCOUNTER — APPOINTMENT (OUTPATIENT)
Dept: UROLOGY | Facility: CLINIC | Age: 34
End: 2024-05-29
Payer: COMMERCIAL

## 2024-05-29 DIAGNOSIS — N20.9 URINARY CALCULUS, UNSPECIFIED: ICD-10-CM

## 2024-05-29 DIAGNOSIS — N20.0 CALCULUS OF KIDNEY: ICD-10-CM

## 2024-05-29 PROCEDURE — 99213 OFFICE O/P EST LOW 20 MIN: CPT

## 2024-05-29 RX ORDER — POTASSIUM CITRATE 15 MEQ/1
15 MEQ TABLET, EXTENDED RELEASE ORAL
Qty: 120 | Refills: 3 | Status: ACTIVE | COMMUNITY
Start: 2022-07-27 | End: 1900-01-01

## 2024-05-29 RX ORDER — ALLOPURINOL 100 MG/1
100 TABLET ORAL DAILY
Qty: 90 | Refills: 3 | Status: ACTIVE | COMMUNITY
Start: 2023-01-25 | End: 1900-01-01

## 2024-06-12 PROBLEM — N20.9 URIC ACID UROLITHIASIS: Status: ACTIVE | Noted: 2023-04-20

## 2024-06-12 PROBLEM — N20.0 NEPHROLITHIASIS: Status: ACTIVE | Noted: 2022-07-27

## 2024-06-12 NOTE — HISTORY OF PRESENT ILLNESS
[FreeTextEntry1] : 34 yo M see previous notes last seen a year ago doing well and lost weight still on K-citrate and allopurinol not on any Na-bicarbonate ultrasound 02/2024: no stones  had some scrortal discomfort on exam no tenderness or lesions on the scrotum  reviewed CT 5/27/2024 in the hospital:  - right 1+3 mm stones - left 8 mm renal cyst   plan: - continue with K-citrate and allopurinol - TTM in 1 month to review 24 hr

## 2024-06-12 NOTE — ASSESSMENT
[FreeTextEntry1] :  plan: - continue with K-citrate and allopurinol - TTM in 1 month to review 24 hr

## 2024-06-12 NOTE — HISTORY OF PRESENT ILLNESS
[FreeTextEntry1] : 32 yo M see previous notes last seen a year ago doing well and lost weight still on K-citrate and allopurinol not on any Na-bicarbonate ultrasound 02/2024: no stones  had some scrortal discomfort on exam no tenderness or lesions on the scrotum  reviewed CT 5/27/2024 in the hospital:  - right 1+3 mm stones - left 8 mm renal cyst   plan: - continue with K-citrate and allopurinol - TTM in 1 month to review 24 hr

## 2024-06-13 NOTE — ASU PATIENT PROFILE, ADULT - BARIATRIC
PATIENT INFORMATION    Anticipatory guidance discussed  Reji looks healthy today    If he has congestion, he can try cetizirine (zyrtec) 2.5 -5 ml once a day as needed     Follow-Up  - Return for your 3 year well child visit.    2 years old Health and Safety Tips - The following hyperlinks are available to access via [facility]    Parent Education from Healthy Parent    Educación para padres sobre niños sanos    Common dosing for acetaminophen and ibuprofen:   Acetaminophen (Tylenol) can be given every 4 hours.  Infant or Children's Elixir: 160mg/5ml for  Weight 18-23 lbs 24-35 lbs 36-47 lbs   Dose 3.75 ml 5 ml 7.5 ml      Weight 48-59 lbs 60-71 lsb 72-95 lbs   Dose 10 ml 12.5 ml 15 ml     Ibuprofen (Motrin) can be given every 6 hours.  Safe for children 6 months and older.  Infant Drops: 50mg/1.25ml  Weight 12-17 lbs 18-23 lbs   Dose 1.25 ml 1.875 ml     Children's Elixir 100mg/5ml   Weight 12-17 lbs 18-23 lbs 24-35 lbs 36-47 lbs   Dose 2.5 ml 3.75 ml 5 ml 7.5 ml        Weight 48-59 lbs 60-71 lbs 72-95 lbs   Dose 10 ml 12.5 ml 15 ml     Additional Educational Resources:  For additional resources regarding your symptoms, diagnosis, or further health information, please visit the Discover a Healthier You section on [email or web address] or the Online Health Resources section in [facility].     6/13/2024    No chief complaint on file.      Assessment and Plan    72 y.o. male manage by    Gross hematuria  Acute ER visit on 6/10 due to reports of gross hematuria, frequency, urgency, dysuria.  CT imaging did not show any acute findings aside from diffuse bladder wall thickening.  He was treated for suspected UTI and was prescribed Keflex.  Symptoms have since resolved.  Urine testing however was negative for infection.  We discussed potential different etiologies to account for his gross hematuria. He does have a history of right renal cell carcinoma status post cryoablation October 2021.  Surveillance CT imaging has shown no metastatic disease in the abdomen with stable cryoablation zone in the right kidney. He does have bilateral renal cyst consistent with proteinaceous versus hemorrhagic cyst.  It is possible his gross hematuria could have been from one of the cyst and was irritated by cutting the grass.  Although CT imaging did not show any acute changes.  He does have history of nephrolithiasis, and it could also be possible that he may have passed a small stone which would have accounted for his frequency, urgency and dysuria.  CT imaging however did not show any evidence of hydronephrosis and he denied any evidence of stone passage in his urine.  Alternatively, he does have history of BPH and it could be possible that when cutting the grass on his riding mower and bouncing around this could have caused irritation to the prostate resulting in hematuria.  Hematuria has since resolved, as urine testing was negative for infection I do not feel the need to continue with Keflex at this time.  Given his history of RCC I am recommending cystoscopy evaluation to exclude any possible malignancy.  In addition we will send his urine for cytology.  He will keep me updated with any recurrence of gross hematuria.      Interval HPI:      He presents today reporting that he started with gross hematuria along with  dysuria. Dysuria had started a few day prior, but noticed gross hematuria on Monday with reported blood clots. He was seen in the emergency department (see additional details below) and treated for UTI and was prescribed Keflex 500 mg BID for 7 days. Gross hematuria and dysuria have since resolved. He does report chronic back pain, and reports that Saturday he had worsening b/l flank pain while cutting grass on his riding tractor. He had similar incident of gross hematuria following cryoablation after riding his four-ramirez.           History of Present Illness  Hay Dela Cruz is a 72 y.o. male here for evaluation of hematuria     Seen at [facility] emergency department on 6/10 for complaints of gross hematuria, dysuria, and urinary frequency. Urine was negative for infection and CT imaging showed no acute changes aside from diffuse bladder wall thickening. No hydroureteronephrosis or radiopaque urolithiasis. Unchanged subcentimeter isodense left lower pole nodule, hemorrhagic cyst versus solid. Given stability since at least 2022, it is likely nonaggressive. Unchanged bilateral renal cysts and subcentimeter hypodensities. Hypertrophy of the median lobe of the prostate protruding into the bladder. It may reflect BPH but given hematuria, correlate clinically to exclude prostatic cancer. Diffuse bladder wall thickening, likely due to prostatomegaly, but correlate to exclude symptoms of cystitis.  He does have history of BPH and is on Flomax.    He is a patient of Dr. Ríos's with a history of morbid obesity and diabetes and renal cystic disease. Patient had an MRI of the abdomen with and without contrast in 2019 at an outside institution which demonstrated right upper pole hemorrhagic cyst 1.2 cm.  Patient then had an ultrasound that demonstrated progression of this concern with a renal protocol CT with questionable hypoenhancement.  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 During his initial postop visit on 11/11/2021,< 1 month post cryoablation he was 4 wheeling and developed gross hematuria.  He was advised not to ride his 4 ramirez for at least 2 weeks and then resume slowly.  He did not require evaluation/procedure by Interventional Radiology for this.  Surveillance CT of chest and abdomen from 3/23/2023showed multiple findings requiring follow-up imaging.  There was interval decrease size of the right upper pole renal neoplasm status post cryoablation.  However there were subcentimeter heterogeneous lesions at the inferior pole of both kidneys, concerning for neoplasm.  Additionally there was a 7 mm nodular density at the bronchus intermedius which may represent small amount of mucus or secretions.  Was recommended 3-month follow-up imaging.  Repeat CT chest and abdomen from 9/6/2023 showed interval resolution of the previously seen nodular opacity in the bronchus intermedius which was likely due to mucous.  There is no findings of metastatic disease in the abdomen.  Stable cryoablation zone in the left kidney upper pole measuring 1.7 x 1.5 cm.  Previously seen heterogeneous subcentimeter lesions at the inferior pole of both kidneys were stable in size and consistent with proteinaceous cyst.  Last seen by me on 10/9/2023 with recommendations to continue surveillance imaging with CT in 1 year.        Prostate cancer screening: Current PSA 0.54 as of 4/17/2024 previously 0.4 on 4/14/2023 and 0.5 on 3/31/2022.      Review of Systems   Constitutional:  Negative for chills and fever.   HENT:  Negative for congestion and sore throat.    Respiratory:  Negative for cough and shortness of breath.    Cardiovascular:  Negative for chest pain and leg swelling.   Gastrointestinal:  Negative for abdominal pain, constipation and diarrhea.   Genitourinary:  Negative for difficulty urinating, dysuria, frequency, hematuria and urgency.   Musculoskeletal:  Negative  for back pain and gait problem.   Skin:  Negative for wound.   Allergic/Immunologic: Negative for immunocompromised state.   Hematological:  Does not bruise/bleed easily.               Vitals  There were no vitals filed for this visit.    Physical Exam  Vitals reviewed.   Constitutional:       General: He is not in acute distress.     Appearance: Normal appearance. He is not ill-appearing or toxic-appearing.   HENT:      Head: Normocephalic and atraumatic.   Eyes:      General: No scleral icterus.     Conjunctiva/sclera: Conjunctivae normal.   Cardiovascular:      Rate and Rhythm: Normal rate.   Pulmonary:      Effort: Pulmonary effort is normal. No respiratory distress.   Abdominal:      Tenderness: There is no right CVA tenderness or left CVA tenderness.      Hernia: No hernia is present.   Musculoskeletal:      Cervical back: Normal range of motion.      Right lower leg: No edema.      Left lower leg: No edema.   Skin:     General: Skin is warm and dry.      Coloration: Skin is not jaundiced or pale.   Neurological:      General: No focal deficit present.      Mental Status: He is alert and oriented to person, place, and time. Mental status is at baseline.      Gait: Gait normal.   Psychiatric:         Mood and Affect: Mood normal.         Behavior: Behavior normal.         Thought Content: Thought content normal.         Judgment: Judgment normal.         Past History  Past Medical History:   Diagnosis Date    Carotid artery occlusion     Disease of thyroid gland     Hypertension     Mixed hyperlipidemia     Peripheral vascular disease (HCC)     Renal mass      Social History     Socioeconomic History    Marital status: /Civil Union     Spouse name: Not on file    Number of children: Not on file    Years of education: Not on file    Highest education level: Not on file   Occupational History    Occupation: [employer] in steady days    Tobacco Use    Smoking status: Never    Smokeless tobacco: Never     Tobacco comments:     Former smoker - As per [employer]    Vaping Use    Vaping status: Never Used   Substance and Sexual Activity    Alcohol use: Yes     Comment: occasionally    Drug use: Never    Sexual activity: Not on file   Other Topics Concern    Not on file   Social History Narrative    Consumes on average 3 cups of regular coffee per day      Social Determinants of Health     Financial Resource Strain: Low Risk  (2023)    Overall Financial Resource Strain (CARDIA)     Difficulty of Paying Living Expenses: Not very hard   Food Insecurity: Not on file   Transportation Needs: No Transportation Needs (2023)    PRAPARE - Transportation     Lack of Transportation (Medical): No     Lack of Transportation (Non-Medical): No   Physical Activity: Not on file   Stress: Not on file   Social Connections: Not on file   Intimate Partner Violence: Not on file   Housing Stability: Not on file     Social History     Tobacco Use   Smoking Status Never   Smokeless Tobacco Never   Tobacco Comments    Former smoker - As per [facility]      Family History   Problem Relation Age of Onset    Breast cancer Mother     Heart disease Father     Stroke Father     Breast cancer Sister     Sudden death Brother         MVA    Obesity Brother     Diabetes Brother     Obesity Brother        The following portions of the patient's history were reviewed and updated as appropriate allergies, current medications, past medical history, past social history, past surgical history and problem list    Imagin/10/2024    CT ABDOMEN AND PELVIS WITH IV CONTRAST     INDICATION: hematuria.     COMPARISON: 2023. 2022.     TECHNIQUE: CT examination of the abdomen and pelvis was performed. Multiplanar 2D reformatted images were created from the source data.     This examination, like all CT scans performed in the [facility], was performed utilizing techniques to minimize radiation dose exposure, including the use of iterative  reconstruction and automated exposure control. Radiation dose length   product (DLP) for this visit: 1500.27 mGy-cm     IV Contrast: 100 mL of iohexol (OMNIPAQUE)  Enteric Contrast: Not administered.     FINDINGS:     ABDOMEN     LOWER CHEST: No clinically significant abnormality in the visualized lower chest. Unchanged juxtapleural less than 4 mm nodule, right lower lobe, requiring no further follow-up.     LIVER/BILIARY TREE: Unchanged faint 1.4 cm hypodensity in segment 4. Otherwise unremarkable.     GALLBLADDER: Surgically absent     SPLEEN: Unremarkable.     PANCREAS: Unremarkable.     ADRENAL GLANDS: Unremarkable.     KIDNEYS/URETERS: Punctate nonobstructive interpolar right renal calculi, at least 2. Unchanged 0.7 cm exophytic focus at the lower pole of the left kidney. Unchanged right upper pole 1.7 cm cyst, and a few scattered subcentimeter bilateral hypodensities.   Small unchanged bilaterally parapelvic cysts. No hydroureteronephrosis or radiopaque urolithiasis.     STOMACH AND BOWEL: Unremarkable.     APPENDIX: No findings to suggest appendicitis.     ABDOMINOPELVIC CAVITY: No ascites. No pneumoperitoneum. No lymphadenopathy.     VESSELS: Atretic celiac trunk. Normal caliber SMA.     PELVIS     REPRODUCTIVE ORGANS: Prostatic median lobe hypertrophy with protrusion into the bladder lumen.     URINARY BLADDER: Decompressed, with mild diffuse wall thickening, possibly due to under distention.     ABDOMINAL WALL/INGUINAL REGIONS: Very small fat-containing umbilical hernia.     BONES: No acute fracture or suspicious osseous lesion. Degenerative changes in the spine, more notably L5-S1.     IMPRESSION:  No hydroureteronephrosis or radiopaque urolithiasis.  Unchanged subcentimeter isodense left lower pole nodule, hemorrhagic cyst versus solid. Given stability since at least 2022, it is likely nonaggressive.  Unchanged bilateral renal cysts and subcentimeter hypodensities.  Hypertrophy of the median lobe of the  prostate protruding into the bladder. It may reflect BPH but given hematuria, correlate clinically to exclude prostatic cancer.  Diffuse bladder wall thickening, likely due to prostatomegaly, but correlate to exclude symptoms of cystitis.  Incidental findings, as per the body of the report.        Results  No results found for this or any previous visit (from the past 1 hour(s)).]  Lab Results   Component Value Date    PSA 0.54 04/17/2024    PSA 0.4 04/14/2023    PSA 0.5 03/31/2022    PSA 0.4 03/16/2021     Lab Results   Component Value Date    CALCIUM 10.0 06/10/2024     05/02/2018    K 4.3 06/10/2024    CO2 25 06/10/2024     06/10/2024    BUN 22 06/10/2024    CREATININE 1.01 06/10/2024     Lab Results   Component Value Date    WBC 10.48 (H) 06/10/2024    HGB 14.5 06/10/2024    HCT 44.4 06/10/2024    MCV 93 06/10/2024     06/10/2024       Please Note:  Voice dictation software has been used to create this document. There may be inadvertent transcriptions errors.     BRANDI Herman 06/13/24    no

## 2024-06-18 ENCOUNTER — APPOINTMENT (OUTPATIENT)
Dept: CARDIOLOGY | Facility: CLINIC | Age: 34
End: 2024-06-18

## 2024-06-19 ENCOUNTER — APPOINTMENT (OUTPATIENT)
Dept: UROLOGY | Facility: CLINIC | Age: 34
End: 2024-06-19

## 2024-06-28 ENCOUNTER — EMERGENCY (EMERGENCY)
Facility: HOSPITAL | Age: 34
LOS: 1 days | Discharge: HOME | End: 2024-06-28
Attending: EMERGENCY MEDICINE
Payer: COMMERCIAL

## 2024-06-28 VITALS
TEMPERATURE: 98 F | SYSTOLIC BLOOD PRESSURE: 134 MMHG | DIASTOLIC BLOOD PRESSURE: 86 MMHG | WEIGHT: 304.68 LBS | HEIGHT: 67 IN | HEART RATE: 88 BPM | OXYGEN SATURATION: 98 % | RESPIRATION RATE: 18 BRPM

## 2024-06-28 DIAGNOSIS — Z98.890 OTHER SPECIFIED POSTPROCEDURAL STATES: Chronic | ICD-10-CM

## 2024-06-28 DIAGNOSIS — Z90.49 ACQUIRED ABSENCE OF OTHER SPECIFIED PARTS OF DIGESTIVE TRACT: Chronic | ICD-10-CM

## 2024-06-28 DIAGNOSIS — Z96.0 PRESENCE OF UROGENITAL IMPLANTS: Chronic | ICD-10-CM

## 2024-06-28 DIAGNOSIS — Z87.81 PERSONAL HISTORY OF (HEALED) TRAUMATIC FRACTURE: Chronic | ICD-10-CM

## 2024-06-28 LAB
ALBUMIN SERPL ELPH-MCNC: 4.2 G/DL — SIGNIFICANT CHANGE UP (ref 3.3–5.2)
ALP SERPL-CCNC: 122 U/L — HIGH (ref 40–120)
ALT FLD-CCNC: 39 U/L — SIGNIFICANT CHANGE UP
ANION GAP SERPL CALC-SCNC: 12 MMOL/L — SIGNIFICANT CHANGE UP (ref 5–17)
APPEARANCE UR: CLEAR — SIGNIFICANT CHANGE UP
AST SERPL-CCNC: 30 U/L — SIGNIFICANT CHANGE UP
BASOPHILS # BLD AUTO: 0.05 K/UL — SIGNIFICANT CHANGE UP (ref 0–0.2)
BASOPHILS NFR BLD AUTO: 0.6 % — SIGNIFICANT CHANGE UP (ref 0–2)
BILIRUB SERPL-MCNC: 0.3 MG/DL — LOW (ref 0.4–2)
BILIRUB UR-MCNC: NEGATIVE — SIGNIFICANT CHANGE UP
BUN SERPL-MCNC: 19.7 MG/DL — SIGNIFICANT CHANGE UP (ref 8–20)
CALCIUM SERPL-MCNC: 8.7 MG/DL — SIGNIFICANT CHANGE UP (ref 8.4–10.5)
CHLORIDE SERPL-SCNC: 99 MMOL/L — SIGNIFICANT CHANGE UP (ref 96–108)
CO2 SERPL-SCNC: 26 MMOL/L — SIGNIFICANT CHANGE UP (ref 22–29)
COLOR SPEC: YELLOW — SIGNIFICANT CHANGE UP
CREAT SERPL-MCNC: 0.59 MG/DL — SIGNIFICANT CHANGE UP (ref 0.5–1.3)
DIFF PNL FLD: NEGATIVE — SIGNIFICANT CHANGE UP
EGFR: 131 ML/MIN/1.73M2 — SIGNIFICANT CHANGE UP
EOSINOPHIL # BLD AUTO: 0.11 K/UL — SIGNIFICANT CHANGE UP (ref 0–0.5)
EOSINOPHIL NFR BLD AUTO: 1.3 % — SIGNIFICANT CHANGE UP (ref 0–6)
GLUCOSE SERPL-MCNC: 134 MG/DL — HIGH (ref 70–99)
GLUCOSE UR QL: NEGATIVE MG/DL — SIGNIFICANT CHANGE UP
HCT VFR BLD CALC: 44.6 % — SIGNIFICANT CHANGE UP (ref 39–50)
HGB BLD-MCNC: 14.5 G/DL — SIGNIFICANT CHANGE UP (ref 13–17)
IMM GRANULOCYTES NFR BLD AUTO: 0.5 % — SIGNIFICANT CHANGE UP (ref 0–0.9)
KETONES UR-MCNC: NEGATIVE MG/DL — SIGNIFICANT CHANGE UP
LEUKOCYTE ESTERASE UR-ACNC: NEGATIVE — SIGNIFICANT CHANGE UP
LIDOCAIN IGE QN: 46 U/L — SIGNIFICANT CHANGE UP (ref 22–51)
LYMPHOCYTES # BLD AUTO: 1.66 K/UL — SIGNIFICANT CHANGE UP (ref 1–3.3)
LYMPHOCYTES # BLD AUTO: 19.3 % — SIGNIFICANT CHANGE UP (ref 13–44)
MCHC RBC-ENTMCNC: 26.1 PG — LOW (ref 27–34)
MCHC RBC-ENTMCNC: 32.5 GM/DL — SIGNIFICANT CHANGE UP (ref 32–36)
MCV RBC AUTO: 80.4 FL — SIGNIFICANT CHANGE UP (ref 80–100)
MONOCYTES # BLD AUTO: 0.44 K/UL — SIGNIFICANT CHANGE UP (ref 0–0.9)
MONOCYTES NFR BLD AUTO: 5.1 % — SIGNIFICANT CHANGE UP (ref 2–14)
NEUTROPHILS # BLD AUTO: 6.3 K/UL — SIGNIFICANT CHANGE UP (ref 1.8–7.4)
NEUTROPHILS NFR BLD AUTO: 73.2 % — SIGNIFICANT CHANGE UP (ref 43–77)
NITRITE UR-MCNC: NEGATIVE — SIGNIFICANT CHANGE UP
PH UR: 5.5 — SIGNIFICANT CHANGE UP (ref 5–8)
PLATELET # BLD AUTO: 200 K/UL — SIGNIFICANT CHANGE UP (ref 150–400)
POTASSIUM SERPL-MCNC: 4.5 MMOL/L — SIGNIFICANT CHANGE UP (ref 3.5–5.3)
POTASSIUM SERPL-SCNC: 4.5 MMOL/L — SIGNIFICANT CHANGE UP (ref 3.5–5.3)
PROT SERPL-MCNC: 6.8 G/DL — SIGNIFICANT CHANGE UP (ref 6.6–8.7)
PROT UR-MCNC: NEGATIVE MG/DL — SIGNIFICANT CHANGE UP
RBC # BLD: 5.55 M/UL — SIGNIFICANT CHANGE UP (ref 4.2–5.8)
RBC # FLD: 14 % — SIGNIFICANT CHANGE UP (ref 10.3–14.5)
SODIUM SERPL-SCNC: 137 MMOL/L — SIGNIFICANT CHANGE UP (ref 135–145)
SP GR SPEC: 1.02 — SIGNIFICANT CHANGE UP (ref 1–1.03)
UROBILINOGEN FLD QL: 0.2 MG/DL — SIGNIFICANT CHANGE UP (ref 0.2–1)
WBC # BLD: 8.6 K/UL — SIGNIFICANT CHANGE UP (ref 3.8–10.5)
WBC # FLD AUTO: 8.6 K/UL — SIGNIFICANT CHANGE UP (ref 3.8–10.5)

## 2024-06-28 PROCEDURE — 80053 COMPREHEN METABOLIC PANEL: CPT

## 2024-06-28 PROCEDURE — 96376 TX/PRO/DX INJ SAME DRUG ADON: CPT

## 2024-06-28 PROCEDURE — 96375 TX/PRO/DX INJ NEW DRUG ADDON: CPT

## 2024-06-28 PROCEDURE — 87086 URINE CULTURE/COLONY COUNT: CPT

## 2024-06-28 PROCEDURE — 74176 CT ABD & PELVIS W/O CONTRAST: CPT | Mod: MC

## 2024-06-28 PROCEDURE — 99284 EMERGENCY DEPT VISIT MOD MDM: CPT | Mod: 25

## 2024-06-28 PROCEDURE — 96374 THER/PROPH/DIAG INJ IV PUSH: CPT

## 2024-06-28 PROCEDURE — 36415 COLL VENOUS BLD VENIPUNCTURE: CPT

## 2024-06-28 PROCEDURE — 99284 EMERGENCY DEPT VISIT MOD MDM: CPT

## 2024-06-28 PROCEDURE — 83690 ASSAY OF LIPASE: CPT

## 2024-06-28 PROCEDURE — 85025 COMPLETE CBC W/AUTO DIFF WBC: CPT

## 2024-06-28 PROCEDURE — 81003 URINALYSIS AUTO W/O SCOPE: CPT

## 2024-06-28 PROCEDURE — 74176 CT ABD & PELVIS W/O CONTRAST: CPT | Mod: 26,MC

## 2024-06-28 RX ORDER — KETOROLAC TROMETHAMINE 30 MG/ML
15 INJECTION, SOLUTION INTRAMUSCULAR ONCE
Refills: 0 | Status: DISCONTINUED | OUTPATIENT
Start: 2024-06-28 | End: 2024-06-28

## 2024-06-28 RX ORDER — SODIUM CHLORIDE 0.9 % (FLUSH) 0.9 %
1000 SYRINGE (ML) INJECTION ONCE
Refills: 0 | Status: COMPLETED | OUTPATIENT
Start: 2024-06-28 | End: 2024-06-28

## 2024-06-28 RX ORDER — ACETAMINOPHEN 325 MG
1000 TABLET ORAL ONCE
Refills: 0 | Status: COMPLETED | OUTPATIENT
Start: 2024-06-28 | End: 2024-06-28

## 2024-06-28 RX ADMIN — Medication 400 MILLIGRAM(S): at 11:24

## 2024-06-28 RX ADMIN — KETOROLAC TROMETHAMINE 15 MILLIGRAM(S): 30 INJECTION, SOLUTION INTRAMUSCULAR at 09:16

## 2024-06-28 RX ADMIN — Medication 1000 MILLILITER(S): at 09:17

## 2024-06-28 RX ADMIN — KETOROLAC TROMETHAMINE 15 MILLIGRAM(S): 30 INJECTION, SOLUTION INTRAMUSCULAR at 09:46

## 2024-06-28 RX ADMIN — KETOROLAC TROMETHAMINE 15 MILLIGRAM(S): 30 INJECTION, SOLUTION INTRAMUSCULAR at 11:24

## 2024-06-28 RX ADMIN — KETOROLAC TROMETHAMINE 15 MILLIGRAM(S): 30 INJECTION, SOLUTION INTRAMUSCULAR at 11:54

## 2024-06-28 RX ADMIN — Medication 1000 MILLIGRAM(S): at 11:54

## 2024-06-29 LAB
CULTURE RESULTS: SIGNIFICANT CHANGE UP
SPECIMEN SOURCE: SIGNIFICANT CHANGE UP

## 2024-07-02 PROBLEM — E66.9 OBESITY (BMI 30-39.9): Status: ACTIVE | Noted: 2024-01-24

## 2024-07-02 PROBLEM — E74.39 GLUCOSE INTOLERANCE: Status: ACTIVE | Noted: 2024-07-02

## 2024-07-03 ENCOUNTER — APPOINTMENT (OUTPATIENT)
Dept: CARDIOLOGY | Facility: CLINIC | Age: 34
End: 2024-07-03
Payer: COMMERCIAL

## 2024-07-03 DIAGNOSIS — E74.39 OTHER DISORDERS OF INTESTINAL CARBOHYDRATE ABSORPTION: ICD-10-CM

## 2024-07-03 DIAGNOSIS — E66.9 OBESITY, UNSPECIFIED: ICD-10-CM

## 2024-07-03 PROCEDURE — G2211 COMPLEX E/M VISIT ADD ON: CPT

## 2024-07-03 PROCEDURE — 99214 OFFICE O/P EST MOD 30 MIN: CPT

## 2024-08-01 ENCOUNTER — APPOINTMENT (OUTPATIENT)
Dept: CARDIOLOGY | Facility: CLINIC | Age: 34
End: 2024-08-01

## 2024-08-01 DIAGNOSIS — Z00.00 ENCOUNTER FOR GENERAL ADULT MEDICAL EXAMINATION W/OUT ABNORMAL FINDINGS: ICD-10-CM

## 2024-08-07 ENCOUNTER — APPOINTMENT (OUTPATIENT)
Dept: CARDIOLOGY | Facility: CLINIC | Age: 34
End: 2024-08-07

## 2024-08-07 ENCOUNTER — APPOINTMENT (OUTPATIENT)
Dept: UROLOGY | Facility: CLINIC | Age: 34
End: 2024-08-07

## 2024-08-07 PROCEDURE — 99214 OFFICE O/P EST MOD 30 MIN: CPT

## 2024-08-07 PROCEDURE — G2211 COMPLEX E/M VISIT ADD ON: CPT

## 2024-08-18 ENCOUNTER — NON-APPOINTMENT (OUTPATIENT)
Age: 34
End: 2024-08-18

## 2024-08-19 ENCOUNTER — RESULT REVIEW (OUTPATIENT)
Age: 34
End: 2024-08-19

## 2024-08-21 ENCOUNTER — APPOINTMENT (OUTPATIENT)
Dept: PULMONOLOGY | Facility: CLINIC | Age: 34
End: 2024-08-21

## 2024-09-09 ENCOUNTER — APPOINTMENT (OUTPATIENT)
Dept: CARDIOLOGY | Facility: CLINIC | Age: 34
End: 2024-09-09
Payer: COMMERCIAL

## 2024-09-09 DIAGNOSIS — G47.33 OBSTRUCTIVE SLEEP APNEA (ADULT) (PEDIATRIC): ICD-10-CM

## 2024-09-09 DIAGNOSIS — E66.9 OBESITY, UNSPECIFIED: ICD-10-CM

## 2024-09-09 PROCEDURE — G2211 COMPLEX E/M VISIT ADD ON: CPT

## 2024-09-09 PROCEDURE — 99214 OFFICE O/P EST MOD 30 MIN: CPT

## 2024-09-18 ENCOUNTER — APPOINTMENT (OUTPATIENT)
Dept: UROLOGY | Facility: CLINIC | Age: 34
End: 2024-09-18

## 2024-09-26 ENCOUNTER — RX RENEWAL (OUTPATIENT)
Age: 34
End: 2024-09-26

## 2024-10-16 ENCOUNTER — RX RENEWAL (OUTPATIENT)
Age: 34
End: 2024-10-16

## 2024-10-22 ENCOUNTER — APPOINTMENT (OUTPATIENT)
Dept: CARDIOLOGY | Facility: CLINIC | Age: 34
End: 2024-10-22

## 2024-10-22 DIAGNOSIS — E66.9 OBESITY, UNSPECIFIED: ICD-10-CM

## 2024-11-07 ENCOUNTER — RX RENEWAL (OUTPATIENT)
Age: 34
End: 2024-11-07

## 2025-01-04 ENCOUNTER — NON-APPOINTMENT (OUTPATIENT)
Age: 35
End: 2025-01-04

## 2025-01-20 ENCOUNTER — APPOINTMENT (OUTPATIENT)
Dept: CARDIOLOGY | Facility: CLINIC | Age: 35
End: 2025-01-20
Payer: COMMERCIAL

## 2025-01-20 DIAGNOSIS — R60.0 LOCALIZED EDEMA: ICD-10-CM

## 2025-01-20 DIAGNOSIS — E66.9 OBESITY, UNSPECIFIED: ICD-10-CM

## 2025-01-20 DIAGNOSIS — G47.33 OBSTRUCTIVE SLEEP APNEA (ADULT) (PEDIATRIC): ICD-10-CM

## 2025-01-20 DIAGNOSIS — Z00.00 ENCOUNTER FOR GENERAL ADULT MEDICAL EXAMINATION W/OUT ABNORMAL FINDINGS: ICD-10-CM

## 2025-01-20 PROCEDURE — 99214 OFFICE O/P EST MOD 30 MIN: CPT | Mod: 95

## 2025-01-20 PROCEDURE — G2211 COMPLEX E/M VISIT ADD ON: CPT | Mod: 95

## 2025-01-20 RX ORDER — TIRZEPATIDE 2.5 MG/.5ML
2.5 INJECTION, SOLUTION SUBCUTANEOUS
Qty: 4 | Refills: 1 | Status: ACTIVE | COMMUNITY
Start: 2025-01-20 | End: 1900-01-01

## 2025-02-25 NOTE — BRIEF OPERATIVE NOTE - ESTIMATED BLOOD LOSS
3 General Sunscreen Counseling: I recommended a broad spectrum sunscreen with a SPF of 30 or higher.  I explained that SPF 30 sunscreens block approximately 97 percent of the sun's harmful rays.  Sunscreens should be applied at least 15 minutes prior to expected sun exposure and then every 2 hours after that as long as sun exposure continues. If swimming or exercising sunscreen should be reapplied every 45 minutes to an hour after getting wet or sweating.  One ounce, or the equivalent of a shot glass full of sunscreen, is adequate to protect the skin not covered by a bathing suit. I also recommended a lip balm with a sunscreen as well. Sun protective clothing can be used in lieu of sunscreen but must be worn the entire time you are exposed to the sun's rays. Detail Level: Detailed
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
## 2025-05-22 NOTE — ED ADULT NURSE NOTE - NEGATIVE SCREENING
No care due was identified.  Health [facility] Embedded Care Due Messages. Reference number: [id number].   5/22/2025 7:02:06 AM CDT   .

## 2025-07-15 NOTE — ED STATDOCS - DR. NAME
"Subjective     Nae Barker is a 39 y.o. female    Gynecologic Exam  The patient's pertinent negatives include no pelvic pain or vaginal discharge. Pertinent negatives include no abdominal pain, back pain, chills, constipation, diarrhea, dysuria, fever, flank pain, frequency, hematuria, nausea, rash, sore throat, urgency or vomiting.       History of Present Illness  The patient is a 39-year-old female who presents for a yearly checkup.    She has expressed interest in undergoing blood work, including a cholesterol check. She has been using vitamin D drops intermittently, a few times a week, and is considering resuming her vitamin D prescription if her levels are found to be low. She reports no changes in her health history since her last visit. She has previously undergone genetic screening. Her menstrual cycles are regular and not excessively heavy. She uses birth control and her partner has had a vasectomy.    FAMILY HISTORY  Her grandmother had cancer at a young age. Her mother has too much iron in her blood and requires monthly treatment.    /80   Ht 165.1 cm (65\")   Wt 53.5 kg (118 lb)   LMP 07/03/2025 (Exact Date)   BMI 19.64 kg/m²     Outpatient Encounter Medications as of 7/15/2025   Medication Sig Dispense Refill    Fenofibrate (LIPOFEN) 150 MG capsule Take 1 capsule by mouth Daily.      vitamin D (ERGOCALCIFEROL) 1.25 MG (97057 UT) capsule capsule Take 1 capsule by mouth 1 (One) Time Per Week. 5 capsule 12     No facility-administered encounter medications on file as of 7/15/2025.       Past Medical History  History reviewed. No pertinent past medical history.    Surgical History  History reviewed. No pertinent surgical history.    Family History  Family History   Problem Relation Age of Onset    Prostate cancer Father 55    Hypertension Mother     Breast cancer Maternal Grandmother 48    Heart disease Maternal Grandfather     Ovarian cancer Neg Hx     Uterine cancer Neg Hx     Colon cancer " Neg Hx     Melanoma Neg Hx        The following portions of the patient's history were reviewed and updated as appropriate: allergies, current medications, past family history, past medical history, past social history, past surgical history, and problem list.    Review of Systems   Constitutional:  Negative for activity change, appetite change, chills, diaphoresis, fatigue, fever, unexpected weight gain and unexpected weight loss.   HENT:  Negative for congestion, dental problem, drooling, ear discharge, ear pain, facial swelling, hearing loss, mouth sores, nosebleeds, postnasal drip, rhinorrhea, sinus pressure, sneezing, sore throat, swollen glands, tinnitus, trouble swallowing and voice change.    Eyes:  Negative for blurred vision, double vision, photophobia, pain, discharge, redness, itching and visual disturbance.   Respiratory:  Negative for apnea, cough, choking, chest tightness, shortness of breath, wheezing and stridor.    Cardiovascular:  Negative for chest pain, palpitations and leg swelling.   Gastrointestinal:  Negative for abdominal distention, abdominal pain, anal bleeding, blood in stool, constipation, diarrhea, nausea, rectal pain, vomiting, GERD and indigestion.   Endocrine: Negative for cold intolerance, heat intolerance, polydipsia, polyphagia and polyuria.   Genitourinary:  Negative for amenorrhea, breast discharge, breast lump, breast pain, decreased libido, decreased urine volume, difficulty urinating, dyspareunia, dysuria, flank pain, frequency, genital sores, hematuria, menstrual problem, pelvic pain, pelvic pressure, urgency, urinary incontinence, vaginal bleeding, vaginal discharge and vaginal pain.   Musculoskeletal:  Negative for arthralgias, back pain, gait problem, joint swelling, myalgias, neck pain, neck stiffness and bursitis.   Skin:  Negative for color change, dry skin and rash.   Allergic/Immunologic: Negative for environmental allergies, food allergies and immunocompromised  state.   Neurological:  Negative for dizziness, tremors, seizures, syncope, facial asymmetry, speech difficulty, weakness, light-headedness, numbness, headache, memory problem and confusion.   Hematological:  Negative for adenopathy. Does not bruise/bleed easily.   Psychiatric/Behavioral:  Negative for agitation, behavioral problems, decreased concentration, dysphoric mood, hallucinations, self-injury, sleep disturbance, suicidal ideas, negative for hyperactivity, depressed mood and stress. The patient is not nervous/anxious.        Objective   Physical Exam  Vitals and nursing note reviewed. Exam conducted with a chaperone present.   Constitutional:       General: She is not in acute distress.     Appearance: She is well-developed. She is not diaphoretic.   HENT:      Head: Normocephalic.      Right Ear: External ear normal.      Left Ear: External ear normal.      Nose: Nose normal.   Eyes:      General: No scleral icterus.        Right eye: No discharge.         Left eye: No discharge.      Conjunctiva/sclera: Conjunctivae normal.      Pupils: Pupils are equal, round, and reactive to light.   Neck:      Thyroid: No thyromegaly.      Vascular: No carotid bruit.      Trachea: No tracheal deviation.   Cardiovascular:      Rate and Rhythm: Normal rate and regular rhythm.      Heart sounds: Normal heart sounds. No murmur heard.  Pulmonary:      Effort: Pulmonary effort is normal. No respiratory distress.      Breath sounds: Normal breath sounds. No wheezing.   Chest:   Breasts:     Breasts are symmetrical.      Right: Normal. No swelling, bleeding, inverted nipple, mass, nipple discharge, skin change or tenderness.      Left: Normal. No swelling, bleeding, inverted nipple, mass, nipple discharge, skin change or tenderness.   Abdominal:      General: There is no distension.      Palpations: Abdomen is soft. There is no mass.      Tenderness: There is no abdominal tenderness. There is no right CVA tenderness, left CVA  tenderness or guarding.      Hernia: No hernia is present. There is no hernia in the left inguinal area or right inguinal area.   Genitourinary:     General: Normal vulva.      Exam position: Lithotomy position.      Labia:         Right: No rash, tenderness, lesion or injury.         Left: No rash, tenderness, lesion or injury.       Vagina: Normal. No signs of injury and foreign body. No vaginal discharge, erythema, tenderness or bleeding.      Cervix: Normal.      Uterus: Normal. Not enlarged, not fixed and not tender.       Adnexa: Right adnexa normal and left adnexa normal.        Right: No mass, tenderness or fullness.          Left: No mass, tenderness or fullness.        Rectum: Normal. No mass.      Comments:   BSU normal  Urethral meatus  Normal  Perineum  Normal  Musculoskeletal:         General: No tenderness. Normal range of motion.      Cervical back: Normal range of motion and neck supple.   Lymphadenopathy:      Head:      Right side of head: No submental, submandibular, tonsillar, preauricular, posterior auricular or occipital adenopathy.      Left side of head: No submental, submandibular, tonsillar, preauricular, posterior auricular or occipital adenopathy.      Cervical: No cervical adenopathy.      Right cervical: No superficial, deep or posterior cervical adenopathy.     Left cervical: No superficial, deep or posterior cervical adenopathy.      Upper Body:      Right upper body: No supraclavicular, axillary or pectoral adenopathy.      Left upper body: No supraclavicular, axillary or pectoral adenopathy.      Lower Body: No right inguinal adenopathy. No left inguinal adenopathy.   Skin:     General: Skin is warm and dry.      Findings: No bruising, erythema or rash.   Neurological:      Mental Status: She is alert and oriented to person, place, and time.      Coordination: Coordination normal.   Psychiatric:         Mood and Affect: Mood normal.         Behavior: Behavior normal.          Thought Content: Thought content normal.         Judgment: Judgment normal.         PHQ-9 Depression Screening  Little interest or pleasure in doing things? Not at all   Feeling down, depressed, or hopeless? Not at all   PHQ-2 Total Score 0   Trouble falling or staying asleep, or sleeping too much?     Feeling tired or having little energy?     Poor appetite or overeating?     Feeling bad about yourself - or that you are a failure or have let yourself or your family down?     Trouble concentrating on things, such as reading the newspaper or watching television?     Moving or speaking so slowly that other people could have noticed? Or the opposite - being so fidgety or restless that you have been moving around a lot more than usual?     Thoughts that you would be better off dead, or of hurting yourself in some way?     PHQ-9 Total Score     If you checked off any problems, how difficult have these problems made it for you to do your work, take care of things at home, or get along with other people? Not difficult at all       Assessment & Plan   Diagnoses and all orders for this visit:    1. Well woman exam with routine gynecological exam (Primary)  Normal GYN exam. Will have lab work. Encouraged SBE, pt is aware how to do self breast exam and the importance of same. Discussed weight management and importance of maintaining a healthy weight. Discussed Vitamin D intake and the importance of adequate vitamin D for both Bone Health and a healthy immune system.  Discussed Daily exercise and the importance of same, in regards to a healthy heart as well as helping to maintain her weight and improving her mental health.  BMI 19.5.  Colonoscopy is up to date.  Mammogram will be scheduled at [facility].  Pap smear is done after  we discussed ASCCP guidelines.  After informed decision patient wishes to proceed with the Pap smear.        -     Liquid-based Pap Smear, Screening  -     CBC & Differential  -      Comprehensive Metabolic Panel  -     Lipid Panel With LDL / HDL Ratio  -     TSH  -     T3, Uptake  -     T4, Free  -     Hemoglobin A1c  -     Urine Culture - , Urine, Clean Catch  -     UA / M With / Rflx Culture([facility] ONLY) - Urine, Clean Catch  -     Hepatitis C Antibody  -     HPV DNA Probe, Direct - ThinPrep Vial, Cervix, Endocervix    2. Encounter for screening mammogram for breast cancer  Comments:  Patient will have a mammogram at [facility].  Orders:  -     Mammo Screening Digital Tomosynthesis Bilateral With CAD; Future    3. Family history of breast cancer  Comments:  Patient has a history of breast cancer.  Her genetic screening was negative.    4. Vitamin D deficiency  Comments:  Will continue vitamin D.  She is currently using drops.  If her levels are good today then she will continue the drops and if not we will add back the tablet weekly.  Orders:  -     Vitamin D,25-Hydroxy         Assessment & Plan  1. Annual checkup.  - Blood pressure is well-regulated at 110/80.  - A mammogram has been ordered at [facility], and she will be contacted to schedule this. Blood work, including cholesterol levels, has been ordered. The results will be communicated via [employer].  - If her vitamin D levels are found to be low, a prescription for vitamin D tablets will be provided. If the levels are adequate, she will continue with the current regimen of vitamin D drops.    BMI is within normal parameters. No other follow-up for BMI required.      Patient or patient representative verbalized consent for the use of Ambient Listening during the visit with  GRICELDA Azul for chart documentation. 7/15/2025  16:47 CDT    GRICELDA Azul  7/15/2025   Sam

## (undated) DEVICE — BOSTON SCIENTIFC ENCORE 26 INFLATOR

## (undated) DEVICE — FOLEY TRAY 16FR LF URINE METER SURESTEP

## (undated) DEVICE — NDL 20CM TROCAR

## (undated) DEVICE — GOWN XXL

## (undated) DEVICE — WARMING BLANKET UPPER ADULT

## (undated) DEVICE — TUBING TUR 2 PRONG

## (undated) DEVICE — Device

## (undated) DEVICE — ADAPTER URETHRAL CATH CONNECTING

## (undated) DEVICE — BLANKET WARMER UPPER ADULT

## (undated) DEVICE — GLV 8 PROTEXIS (WHITE)

## (undated) DEVICE — PORT BIOPSY

## (undated) DEVICE — SYR CATH TIP 2 OZ

## (undated) DEVICE — TUBING LEVEL ONE NORMOFLO SET

## (undated) DEVICE — LAP PAD 4 X 18"

## (undated) DEVICE — DRAPE C ARM UNIVERSAL

## (undated) DEVICE — SUT SILK 0 18" TIES

## (undated) DEVICE — WRAP COMPRESSION CALF LG

## (undated) DEVICE — GOWN TRIMAX LG

## (undated) DEVICE — DRAPE GYN SURG LINGEMAN

## (undated) DEVICE — VENODYNE/SCD SLEEVE CALF LARGE

## (undated) DEVICE — VISITEC 4X4

## (undated) DEVICE — PREP BETADINE SPONGE STICKS

## (undated) DEVICE — SYR LUER LOK 5CC

## (undated) DEVICE — IRR BULB PATHFINDER + 10"

## (undated) DEVICE — SOL IRR BAG H2O 3000ML

## (undated) DEVICE — PACK CYSTOSCOPY TIBURON

## (undated) DEVICE — WARMING BLANKET LOWER ADULT

## (undated) DEVICE — VENODYNE/SCD SLEEVE CALF MEDIUM

## (undated) DEVICE — SOL IRR BAG NS 0.9% 3000ML

## (undated) DEVICE — GLV 7.5 PROTEXIS (WHITE)

## (undated) DEVICE — DRAINAGE BAG URINARY 2L

## (undated) DEVICE — SYR LUER LOK 10CC

## (undated) DEVICE — DRAPE NEPHROSCOPY 72X118"

## (undated) DEVICE — FOLEY CATH 2-WAY 16FR 5CC LATEX COUNCIL RED

## (undated) DEVICE — AMPLATZ RENAL DILATOR 6FR-30FR X 20CM

## (undated) DEVICE — TUBING CONNECTING FOR DRAINAGE BAG MALE / FEMALE 14FR 30CM

## (undated) DEVICE — NDL BIOPSY TROCAR TWO-PART 18G X 20CM